# Patient Record
Sex: MALE | Race: WHITE | Employment: OTHER | URBAN - METROPOLITAN AREA
[De-identification: names, ages, dates, MRNs, and addresses within clinical notes are randomized per-mention and may not be internally consistent; named-entity substitution may affect disease eponyms.]

---

## 2017-09-11 PROCEDURE — 84403 ASSAY OF TOTAL TESTOSTERONE: CPT | Performed by: INTERNAL MEDICINE

## 2017-09-11 PROCEDURE — 84402 ASSAY OF FREE TESTOSTERONE: CPT | Performed by: INTERNAL MEDICINE

## 2018-12-21 PROCEDURE — 87493 C DIFF AMPLIFIED PROBE: CPT | Performed by: INTERNAL MEDICINE

## 2019-12-16 PROBLEM — K86.1 CHRONIC PANCREATITIS, UNSPECIFIED PANCREATITIS TYPE (HCC): Status: ACTIVE | Noted: 2019-12-16

## 2020-04-20 NOTE — Clinical Note
Please fax copy of note to janes and put note on letter if they can fax most recent biopsy results. Thanks.      Joanna Morocho,   [facility]  12/23/2021 Dermal Autograft Text: The defect edges were debeveled with a #15 scalpel blade.  Given the location of the defect, shape of the defect and the proximity to free margins a dermal autograft was deemed most appropriate.  Using a sterile surgical marker, the primary defect shape was transferred to the donor site. The area thus outlined was incised deep to adipose tissue with a #15 scalpel blade.  The harvested graft was then trimmed of adipose and epidermal tissue until only dermis was left.  The skin graft was then placed in the primary defect and oriented appropriately.

## 2020-12-10 PROBLEM — M19.072 OSTEOARTHRITIS OF MIDTARSAL JOINT OF LEFT FOOT: Status: ACTIVE | Noted: 2020-12-10

## 2021-02-09 PROBLEM — I25.118: Status: ACTIVE | Noted: 2021-02-09

## 2021-02-09 PROBLEM — I70.0 AORTIC ATHEROSCLEROSIS: Status: ACTIVE | Noted: 2021-02-09

## 2021-02-09 PROBLEM — I35.0 NONRHEUMATIC AORTIC VALVE STENOSIS: Status: ACTIVE | Noted: 2021-02-09

## 2021-02-09 PROBLEM — M06.00 SERONEGATIVE RHEUMATOID ARTHRITIS (HCC): Status: ACTIVE | Noted: 2021-02-09

## 2021-02-09 PROBLEM — I25.118 ATHEROSCLEROSIS OF CORONARY ARTERY OF NATIVE HEART WITH STABLE ANGINA PECTORIS, UNSPECIFIED VESSEL OR LESION TYPE (HCC): Status: ACTIVE | Noted: 2021-02-09

## 2021-02-09 PROBLEM — R03.0 ELEVATED BP WITHOUT DIAGNOSIS OF HYPERTENSION: Status: ACTIVE | Noted: 2021-02-09

## 2021-02-09 PROBLEM — I70.0 AORTIC ATHEROSCLEROSIS (HCC): Status: ACTIVE | Noted: 2021-02-09

## 2021-06-20 ENCOUNTER — APPOINTMENT (OUTPATIENT)
Dept: MRI IMAGING | Facility: HOSPITAL | Age: 81
End: 2021-06-20
Attending: EMERGENCY MEDICINE
Payer: MEDICARE

## 2021-06-20 ENCOUNTER — APPOINTMENT (OUTPATIENT)
Dept: GENERAL RADIOLOGY | Facility: HOSPITAL | Age: 81
End: 2021-06-20
Attending: EMERGENCY MEDICINE
Payer: MEDICARE

## 2021-06-20 ENCOUNTER — HOSPITAL ENCOUNTER (EMERGENCY)
Facility: HOSPITAL | Age: 81
Discharge: HOME | End: 2021-06-20
Attending: EMERGENCY MEDICINE
Payer: MEDICARE

## 2021-06-20 VITALS
HEIGHT: 67 IN | OXYGEN SATURATION: 97 % | DIASTOLIC BLOOD PRESSURE: 74 MMHG | HEART RATE: 67 BPM | SYSTOLIC BLOOD PRESSURE: 156 MMHG | TEMPERATURE: 97 F | BODY MASS INDEX: 22.45 KG/M2 | WEIGHT: 143.06 LBS | RESPIRATION RATE: 15 BRPM

## 2021-06-20 DIAGNOSIS — R42 DIZZINESS: Primary | ICD-10-CM

## 2021-06-20 PROCEDURE — 85730 THROMBOPLASTIN TIME PARTIAL: CPT | Performed by: EMERGENCY MEDICINE

## 2021-06-20 PROCEDURE — 70549 MR ANGIOGRAPH NECK W/O&W/DYE: CPT | Performed by: EMERGENCY MEDICINE

## 2021-06-20 PROCEDURE — 99284 EMERGENCY DEPT VISIT MOD MDM: CPT

## 2021-06-20 PROCEDURE — A9575 INJ GADOTERATE MEGLUMI 0.1ML: HCPCS | Performed by: EMERGENCY MEDICINE

## 2021-06-20 PROCEDURE — 70553 MRI BRAIN STEM W/O & W/DYE: CPT | Performed by: EMERGENCY MEDICINE

## 2021-06-20 PROCEDURE — 71045 X-RAY EXAM CHEST 1 VIEW: CPT | Performed by: EMERGENCY MEDICINE

## 2021-06-20 PROCEDURE — 85025 COMPLETE CBC W/AUTO DIFF WBC: CPT | Performed by: EMERGENCY MEDICINE

## 2021-06-20 PROCEDURE — 70546 MR ANGIOGRAPH HEAD W/O&W/DYE: CPT | Performed by: EMERGENCY MEDICINE

## 2021-06-20 PROCEDURE — 36415 COLL VENOUS BLD VENIPUNCTURE: CPT

## 2021-06-20 PROCEDURE — 85610 PROTHROMBIN TIME: CPT | Performed by: EMERGENCY MEDICINE

## 2021-06-20 PROCEDURE — 93005 ELECTROCARDIOGRAM TRACING: CPT

## 2021-06-20 PROCEDURE — 84484 ASSAY OF TROPONIN QUANT: CPT | Performed by: EMERGENCY MEDICINE

## 2021-06-20 PROCEDURE — 93010 ELECTROCARDIOGRAM REPORT: CPT

## 2021-06-20 PROCEDURE — 80053 COMPREHEN METABOLIC PANEL: CPT | Performed by: EMERGENCY MEDICINE

## 2021-06-20 RX ORDER — MECLIZINE HCL 12.5 MG/1
12.5 TABLET ORAL ONCE
Status: COMPLETED | OUTPATIENT
Start: 2021-06-20 | End: 2021-06-20

## 2021-06-20 RX ORDER — ONDANSETRON 2 MG/ML
4 INJECTION INTRAMUSCULAR; INTRAVENOUS ONCE
Status: DISCONTINUED | OUTPATIENT
Start: 2021-06-20 | End: 2021-06-20

## 2021-06-20 NOTE — ED QUICK NOTES
Pt awake and alert, skin w/d,resps reg/unlabored. Gait steady. Speech clear. Family at bedside. Pt ready for discharge.

## 2021-06-20 NOTE — ED PROVIDER NOTES
Patient Seen in: [facility] Emergency Department      History   Patient presents with:  Dizziness    Stated Complaint: dizziness and feeling unsteady on feet with nausea starting at 4am, vomited a c*    HPI/Subjective:   HPI    56-year-old male pres Systems    Positive for stated complaint: dizziness and feeling unsteady on feet with nausea starting at 4am, vomited a c*  Other systems are as noted in HPI. Constitutional and vital signs reviewed.       All other systems reviewed and negative except as following components:    PTT 36.8 (*)     All other components within normal limits   CBC W/ DIFFERENTIAL - Abnormal; Notable for the following components:    RDW-SD 46.8 (*)     Lymphocyte Absolute 0.97 (*)     All other components within normal limits (IHK=95570/97420/47571)    Result Date: 6/20/2021  PROCEDURE:  MRI BRAIN MRA HEAD+MRA NECK (ALL W+WO) (CPT=70553/92193/63596)  COMPARISON:  None.   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 No signs of occlusion.   No restricted diffusion or oth

## 2021-06-20 NOTE — ED INITIAL ASSESSMENT (HPI)
Reports got out of bed, had unsteady gait. A little better now. Took 1/2 meclizine PTA. Here with family member, who is ER physician.

## 2021-06-21 PROBLEM — R26.89 IMBALANCE: Status: ACTIVE | Noted: 2021-06-21

## 2021-06-21 PROBLEM — H69.83 ETD (EUSTACHIAN TUBE DYSFUNCTION), BILATERAL: Status: ACTIVE | Noted: 2021-06-21

## 2021-06-21 PROBLEM — H69.93 ETD (EUSTACHIAN TUBE DYSFUNCTION), BILATERAL: Status: ACTIVE | Noted: 2021-06-21

## 2021-06-21 PROBLEM — L30.9 DERMATITIS: Status: ACTIVE | Noted: 2021-06-21

## 2021-06-21 PROBLEM — R11.0 NAUSEA: Status: ACTIVE | Noted: 2021-06-21

## 2021-06-21 PROBLEM — R68.2 DRY MOUTH: Status: ACTIVE | Noted: 2021-06-21

## 2021-06-21 PROBLEM — G47.00 INSOMNIA, UNSPECIFIED TYPE: Status: ACTIVE | Noted: 2021-06-21

## 2021-06-21 PROBLEM — F41.1 GAD (GENERALIZED ANXIETY DISORDER): Status: ACTIVE | Noted: 2021-06-21

## 2021-08-12 ENCOUNTER — OFFICE VISIT (OUTPATIENT)
Dept: RHEUMATOLOGY | Facility: CLINIC | Age: 81
End: 2021-08-12
Payer: MEDICARE

## 2021-08-12 VITALS
HEART RATE: 76 BPM | BODY MASS INDEX: 22.6 KG/M2 | DIASTOLIC BLOOD PRESSURE: 82 MMHG | HEIGHT: 67 IN | WEIGHT: 144 LBS | SYSTOLIC BLOOD PRESSURE: 156 MMHG

## 2021-08-12 DIAGNOSIS — L40.9 PSORIASIS: ICD-10-CM

## 2021-08-12 DIAGNOSIS — L40.50 PSORIATIC ARTHRITIS (HCC): Primary | ICD-10-CM

## 2021-08-12 PROCEDURE — 99204 OFFICE O/P NEW MOD 45 MIN: CPT | Performed by: INTERNAL MEDICINE

## 2021-08-12 NOTE — PROGRESS NOTES
Dat Barnes is a 79year old male who presents for No chief complaint on file. Clinton Lopez HPI:   CC: evaluate for RA  Consult: referred by PCP Dr. Debra Muñoz    This is a 78 yo M with hx of HLD, COPD presents for second opinion of his rheumatoid arthritis.   He Heartburn. • fluticasone-salmeterol (ADVAIR HFA) 115-21 MCG/ACT Inhalation Aerosol Inhale 2 puffs into the lungs 2 (two) times daily.  3 Inhaler 3   • Tiotropium Bromide Monohydrate (SPIRIVA RESPIMAT) 2.5 MCG/ACT Inhalation Aero Soln Inhale 1 Inhaler in Smoker        Packs/day: 1.00        Types: Cigarettes        Start date: 1955        Quit date: 2018        Years since quittin.9      Smokeless tobacco: Never Used    Alcohol use: Not Currently      Alcohol/week: 0.0 standard drinks      Com pain or swelling or warmth on palpation  Feet: no pain with MTP squeeze, no toe swelling or pain or warmth on palpation with FROM  Spine: no lumbar or sacral pain on palpation. NEURO: Cranial nerves II-XII intact grossly.  5/5 strength throughout in both u cleared. Once he start Plaquenil his psoriasis came back on his legs and his elbows  - Plaquenil was decreased to 200 mg daily.  - Advised patient that the best option would be a biologic such as Humira, would help treat both the joints and the skin.   Ris

## 2021-08-12 NOTE — PATIENT INSTRUCTIONS
You were seen today for joint pain and swelling due to psoriatic arthritis  You currently are on Plaquenil/hydroxychloroquine, can make the psoriasis worse so would recommend to discontinue it.   We will hold off on discontinuing until we decide which medic

## 2021-08-13 ENCOUNTER — TELEPHONE (OUTPATIENT)
Dept: RHEUMATOLOGY | Facility: CLINIC | Age: 81
End: 2021-08-13

## 2021-08-25 NOTE — TELEPHONE ENCOUNTER
José Manuel Mahan with apothecary by [facility] has been approved by [employer]. Patient has a high copay.  Please advise if patient will be applying for financial assistance program.

## 2021-08-30 PROBLEM — L40.50 PSORIATIC ARTHRITIS (HCC): Status: ACTIVE | Noted: 2021-08-30

## 2021-08-30 PROBLEM — L40.9 PSORIASIS: Status: ACTIVE | Noted: 2021-08-30

## 2021-08-30 NOTE — TELEPHONE ENCOUNTER
[facility] assist provider portion completed and faxed. Attempted to contact patient to make sure he received patient portion.

## 2021-08-31 ENCOUNTER — TELEPHONE (OUTPATIENT)
Dept: RHEUMATOLOGY | Facility: CLINIC | Age: 81
End: 2021-08-31

## 2021-08-31 NOTE — TELEPHONE ENCOUNTER
Brenda Thakkar is requesting patient's demographics. Patient is new to them and has received an application. Fax # [phone].

## 2021-09-03 NOTE — TELEPHONE ENCOUNTER
Sonya sent fax requesting contact information and insurance information.    Information provided to [facility] Assist.

## 2021-12-23 ENCOUNTER — OFFICE VISIT (OUTPATIENT)
Dept: RHEUMATOLOGY | Facility: CLINIC | Age: 81
End: 2021-12-23
Payer: MEDICARE

## 2021-12-23 VITALS
HEIGHT: 67 IN | WEIGHT: 141 LBS | RESPIRATION RATE: 16 BRPM | BODY MASS INDEX: 22.13 KG/M2 | OXYGEN SATURATION: 99 % | SYSTOLIC BLOOD PRESSURE: 138 MMHG | HEART RATE: 56 BPM | TEMPERATURE: 97 F | DIASTOLIC BLOOD PRESSURE: 60 MMHG

## 2021-12-23 DIAGNOSIS — M53.3 CHRONIC LEFT SI JOINT PAIN: ICD-10-CM

## 2021-12-23 DIAGNOSIS — R21 SKIN RASH: ICD-10-CM

## 2021-12-23 DIAGNOSIS — M54.50 CHRONIC LEFT-SIDED LOW BACK PAIN WITHOUT SCIATICA: ICD-10-CM

## 2021-12-23 DIAGNOSIS — R76.8 ANA POSITIVE: ICD-10-CM

## 2021-12-23 DIAGNOSIS — G89.29 CHRONIC LEFT SI JOINT PAIN: ICD-10-CM

## 2021-12-23 DIAGNOSIS — L60.8 NAIL PITTING: ICD-10-CM

## 2021-12-23 DIAGNOSIS — Z79.899 HIGH RISK MEDICATION USE: ICD-10-CM

## 2021-12-23 DIAGNOSIS — L40.9 PSORIASIS: ICD-10-CM

## 2021-12-23 DIAGNOSIS — G89.29 CHRONIC LEFT-SIDED LOW BACK PAIN WITHOUT SCIATICA: ICD-10-CM

## 2021-12-23 DIAGNOSIS — L40.50 PSORIATIC ARTHRITIS (HCC): Primary | ICD-10-CM

## 2021-12-23 PROCEDURE — 99205 OFFICE O/P NEW HI 60 MIN: CPT | Performed by: INTERNAL MEDICINE

## 2021-12-23 RX ORDER — OMEPRAZOLE 20 MG/1
20 CAPSULE, DELAYED RELEASE ORAL
COMMUNITY

## 2021-12-23 NOTE — PROGRESS NOTES
?  RHEUMATOLOGY NEW PATIENT   Date of visit: 12/23/2021  ? Patient presents with:  Establish Care: new pt. Dr. Yuli Sanders referral. Symptoms started in 2020. Previous rheum Dr. Aracelis Maxwell. Was told at first that he had RA and was put on plaquenil.  Previous prov rash and his dry mouth/fatigue. Given the concern for psoriatic arthritis (hx of inflammatory arthritis, nail changes, psoriasis of skin), I am worried about possible SI joint inflammation.    I recommended he get updated age appropriate cancer screenings, VIEWS) (CPT=72110); Future    Chronic left SI joint pain  -     Cancel: XR SACROILIAC JOINTS (MIN 3 VIEWS) (CPT=72202); Future    High risk medication use  -     CBC WITH DIFFERENTIAL WITH PLATELET; Future  -     COMP METABOLIC PANEL (14);  Future    Skin r not able to get approved through insurance. After completing sample pack, he was able to get generic from [facility] but still having skin flaring while on the generic.   Was seen by dermatology- Dr. Ambrose Fleischer again, and started prednisone (as high as 40mg) and did pain, diarrhea, bloody stools, nodular painful shin bruises, Achilles heel pain or symptoms of enthesitis. There are no symptoms of severe dry eyes.   No fevers, chills, lymphadenopathy, unexpected weight loss,      Past Medical History:  Past Medical Hist Disp: 90 tablet, Rfl: 0  fluticasone-umeclidin-vilant (TRELEGY ELLIPTA) 100-62.5-25 MCG/INH Inhalation Aerosol Powder, Breath Activated, Inhale 1 puff into the lungs daily. , Disp: 3 each, Rfl: 3  amLODIPine 2.5 MG Oral Tab, Take 1 tablet (2.5 mg total) by hemoptysis and shortness of breath (hx COPD). Cardiovascular: Positive for leg swelling. Negative for chest pain and palpitations. Gastrointestinal: Negative for abdominal pain, blood in stool, constipation, diarrhea and heartburn.    Musculoskeletal: tender. No spinous process tenderness. .  Bilateral knees without medial joint line tenderness, mild crepitus, no effusion. Lymphadenopathy:      Cervical: No cervical adenopathy. Skin:     General: Skin is warm and dry.       Findings: Erythema and marcy     With contrast infusion, there is no abnormal enhancement pattern identified.       MRA HEAD AND NECK       Patency of the carotid and vertebral arterial structures of the neck, with no signs of occlusion, high-grade stenosis, acute thrombosis, acute Finalized by (CST): Rylee Ortez MD on 6/20/2021 at 3:47 PM      DATE OF SERVICE: 04.22.2021     LEFT FOOT  AP, LATERAL AND OBLIQUE (3 VIEWS)   CLINICAL INFORMATION:  Left foot pain. COMPARISON STUDY: Left foot, 12/6/2020.    FINDINGS:   No acute fra bilateral hands and wrists.      DATE OF SERVICE: 01.01.2021     CT CHEST   CLINICAL INDICATION: Follow-up abnormal chest CT   COMPARISON: Chest CT 5/1/2020   TECHNIQUE: 1.25mm thick axial images were obtained through the chest. Coronal reconstructions were Labs:  Lab Results   Component Value Date    WBC 7.1 06/20/2021    RBC 4.36 06/20/2021    HGB 14.5 06/20/2021    HCT 41.9 06/20/2021    .0 06/20/2021    MPV 9.9 03/26/2011    MCV 96.1 06/20/2021    MCH 33.3 06/20/2021    MCHC 34.6 06/20/2021

## 2022-01-03 ENCOUNTER — TELEPHONE (OUTPATIENT)
Dept: RHEUMATOLOGY | Facility: CLINIC | Age: 82
End: 2022-01-03

## 2022-01-03 NOTE — TELEPHONE ENCOUNTER
Phoned pt, pt states some labs have been resulted to him and pt is concerned WBC 12.7. Explained to pt Dr. Cesia Kerr is out of the office this week, and occasionally will be checking in with our office.  Explained to pt WBC mildly elevated, pt denies any signs

## 2022-01-03 NOTE — TELEPHONE ENCOUNTER
Pt calling in, would like do discuss some of his lab results and would like to know what Dr. Gisselle Moon thinks about his labs. Pt would like to know about his SOO and WBC.  Pt is wanting to know if Dr. Gisselle Moon believes that the pt has an infection due to his WBC

## 2022-01-04 PROBLEM — L30.8 SPONGIOTIC DERMATITIS: Status: ACTIVE | Noted: 2022-01-04

## 2022-01-04 NOTE — TELEPHONE ENCOUNTER
Called pt. Discussed test results in detail. CBC did show elevated WBC which was up from 06/2021 testing. Discussed this could be a reaction to his widespread active skin but could be related to underlying infection.  He has plans to see his PCP today but

## 2022-01-06 ENCOUNTER — TELEPHONE (OUTPATIENT)
Dept: RHEUMATOLOGY | Facility: CLINIC | Age: 82
End: 2022-01-06

## 2022-01-06 NOTE — TELEPHONE ENCOUNTER
Returned pt call, pt states PA for Tremfya which was ordered by his old Rheumatologist has been denied per his health plan. Pt states denial letter states pt must have tried and failed:  Humira, Enbrel and Cosentyx. MRVY#[id number].    Pt requesting we contac

## 2022-01-07 NOTE — TELEPHONE ENCOUNTER
Phoned pt and explained Dr. Adeline Eason note to pt \"discontinue [place] completely in case the rash was a reaction to the medication. I'm not sure if he remembers that conversation.  I would recommend discontinuing and then monitoring rash after one week befor

## 2022-01-10 ENCOUNTER — TELEPHONE (OUTPATIENT)
Dept: RHEUMATOLOGY | Facility: CLINIC | Age: 82
End: 2022-01-10

## 2022-01-10 NOTE — TELEPHONE ENCOUNTER
Phoned pt, explained to pt new lesions despite holding otezla, unlikely related to [address] since he's stopped.  pt coming in for teaching for the cosentyx, however would like to stay on Thiago Lindsey due to his worry over out of pocket cost. Pt has reached out to debra

## 2022-01-11 ENCOUNTER — PATIENT MESSAGE (OUTPATIENT)
Dept: RHEUMATOLOGY | Facility: CLINIC | Age: 82
End: 2022-01-11

## 2022-01-11 DIAGNOSIS — R21 SKIN RASH: Primary | ICD-10-CM

## 2022-01-11 DIAGNOSIS — R76.8 ANA POSITIVE: ICD-10-CM

## 2022-01-13 ENCOUNTER — LAB ENCOUNTER (OUTPATIENT)
Dept: LAB | Facility: HOSPITAL | Age: 82
End: 2022-01-13
Attending: INTERNAL MEDICINE
Payer: MEDICARE

## 2022-01-13 DIAGNOSIS — R76.8 ANA POSITIVE: ICD-10-CM

## 2022-01-13 DIAGNOSIS — R21 SKIN RASH: ICD-10-CM

## 2022-01-13 DIAGNOSIS — Z79.899 HIGH RISK MEDICATION USE: ICD-10-CM

## 2022-01-13 DIAGNOSIS — L40.50 PSORIATIC ARTHRITIS (HCC): ICD-10-CM

## 2022-01-13 LAB
ALBUMIN SERPL-MCNC: 4 G/DL (ref 3.4–5)
ALBUMIN/GLOB SERPL: 1.4 {RATIO} (ref 1–2)
ALP LIVER SERPL-CCNC: 56 U/L
ALT SERPL-CCNC: 36 U/L
ANION GAP SERPL CALC-SCNC: 4 MMOL/L (ref 0–18)
AST SERPL-CCNC: 22 U/L (ref 15–37)
BASOPHILS # BLD AUTO: 0.03 X10(3) UL (ref 0–0.2)
BASOPHILS NFR BLD AUTO: 0.4 %
BILIRUB SERPL-MCNC: 0.9 MG/DL (ref 0.1–2)
BUN BLD-MCNC: 22 MG/DL (ref 7–18)
CALCIUM BLD-MCNC: 9.4 MG/DL (ref 8.5–10.1)
CHLORIDE SERPL-SCNC: 103 MMOL/L (ref 98–112)
CO2 SERPL-SCNC: 32 MMOL/L (ref 21–32)
CREAT BLD-MCNC: 1.07 MG/DL
EOSINOPHIL # BLD AUTO: 0.09 X10(3) UL (ref 0–0.7)
EOSINOPHIL NFR BLD AUTO: 1.1 %
ERYTHROCYTE [DISTWIDTH] IN BLOOD BY AUTOMATED COUNT: 15 %
FASTING STATUS PATIENT QL REPORTED: YES
GLOBULIN PLAS-MCNC: 2.8 G/DL (ref 2.8–4.4)
GLUCOSE BLD-MCNC: 88 MG/DL (ref 70–99)
HCT VFR BLD AUTO: 47.3 %
HGB BLD-MCNC: 15.1 G/DL
IMM GRANULOCYTES # BLD AUTO: 0.02 X10(3) UL (ref 0–1)
IMM GRANULOCYTES NFR BLD: 0.2 %
LYMPHOCYTES # BLD AUTO: 1.51 X10(3) UL (ref 1–4)
LYMPHOCYTES NFR BLD AUTO: 18.9 %
MCH RBC QN AUTO: 30 PG (ref 26–34)
MCHC RBC AUTO-ENTMCNC: 31.9 G/DL (ref 31–37)
MCV RBC AUTO: 93.8 FL
MONOCYTES # BLD AUTO: 0.92 X10(3) UL (ref 0.1–1)
MONOCYTES NFR BLD AUTO: 11.5 %
NEUTROPHILS # BLD AUTO: 5.44 X10 (3) UL (ref 1.5–7.7)
NEUTROPHILS # BLD AUTO: 5.44 X10(3) UL (ref 1.5–7.7)
NEUTROPHILS NFR BLD AUTO: 67.9 %
OSMOLALITY SERPL CALC.SUM OF ELEC: 291 MOSM/KG (ref 275–295)
PLATELET # BLD AUTO: 185 10(3)UL (ref 150–450)
POTASSIUM SERPL-SCNC: 4.3 MMOL/L (ref 3.5–5.1)
PROT SERPL-MCNC: 6.8 G/DL (ref 6.4–8.2)
RBC # BLD AUTO: 5.04 X10(6)UL
SODIUM SERPL-SCNC: 139 MMOL/L (ref 136–145)
WBC # BLD AUTO: 8 X10(3) UL (ref 4–11)

## 2022-01-13 PROCEDURE — 86235 NUCLEAR ANTIGEN ANTIBODY: CPT

## 2022-01-13 PROCEDURE — 80053 COMPREHEN METABOLIC PANEL: CPT

## 2022-01-13 PROCEDURE — 86256 FLUORESCENT ANTIBODY TITER: CPT

## 2022-01-13 PROCEDURE — 85025 COMPLETE CBC W/AUTO DIFF WBC: CPT

## 2022-01-13 PROCEDURE — 36415 COLL VENOUS BLD VENIPUNCTURE: CPT

## 2022-01-13 PROCEDURE — 86038 ANTINUCLEAR ANTIBODIES: CPT

## 2022-01-14 LAB
ANA SER QL: NEGATIVE
ENA SM AB SER-ACNC: <100 AU/ML (ref ?–100)
HISTONE AB SER-ACNC: <100 AU/ML (ref ?–100)

## 2022-01-16 LAB — ANTI-NUCLEAR ANTIBODY (ANA), HEP-2 IGG: DETECTED

## 2022-01-17 ENCOUNTER — VIRTUAL PHONE E/M (OUTPATIENT)
Dept: RHEUMATOLOGY | Facility: CLINIC | Age: 82
End: 2022-01-17
Payer: MEDICARE

## 2022-01-17 DIAGNOSIS — R21 SKIN RASH: Primary | ICD-10-CM

## 2022-01-17 DIAGNOSIS — R76.8 ANA POSITIVE: ICD-10-CM

## 2022-01-17 DIAGNOSIS — L40.50 PSORIATIC ARTHRITIS (HCC): ICD-10-CM

## 2022-01-17 DIAGNOSIS — L40.0 PLAQUE PSORIASIS: ICD-10-CM

## 2022-01-17 PROCEDURE — 99213 OFFICE O/P EST LOW 20 MIN: CPT | Performed by: INTERNAL MEDICINE

## 2022-01-17 NOTE — PROGRESS NOTES
Virtual Telephone Check-In    [address] verbally consents to a Virtual/Telephone Check-In visit on 01/17/22. Patient has been referred to the [facility] website at [email or web address] to review the yearly Consent to Treat document.     Patient under

## 2022-01-20 ENCOUNTER — TELEPHONE (OUTPATIENT)
Dept: RHEUMATOLOGY | Facility: CLINIC | Age: 82
End: 2022-01-20

## 2022-01-20 NOTE — TELEPHONE ENCOUNTER
PA for Cosentyx approved through 12/31/22. Ref# [id number]    Phoned pt to notify of approval and arrange teaching. Pt states he will be seeing an allergist tomorrow and would like to see what he recommends.  States his skin has improved, would like to [employer]

## 2022-01-21 NOTE — TELEPHONE ENCOUNTER
Pt phoned office, has met with allergist today. No new information given to pt at today's visit. Pt having co of stiffness, and would like to move forward with cosentyx. Nurse teaching scheduled.    Future Appointments   Date Time Provider Jerri Girard

## 2022-01-24 ENCOUNTER — TELEPHONE (OUTPATIENT)
Dept: RHEUMATOLOGY | Facility: CLINIC | Age: 82
End: 2022-01-24

## 2022-01-24 ENCOUNTER — NURSE ONLY (OUTPATIENT)
Dept: RHEUMATOLOGY | Facility: CLINIC | Age: 82
End: 2022-01-24
Payer: MEDICARE

## 2022-01-24 PROCEDURE — 99211 OFF/OP EST MAY X REQ PHY/QHP: CPT | Performed by: INTERNAL MEDICINE

## 2022-01-24 NOTE — PROGRESS NOTES
Pt and spouse here for cosentyx teaching. Storage, handling of medication , and injection training reviewed. After using demo pen, pt safely and accurately administered 150 mg sample subcutaneous into right upper abdomen. Pt tolerated without incidence.

## 2022-02-07 ENCOUNTER — NURSE ONLY (OUTPATIENT)
Dept: RHEUMATOLOGY | Facility: CLINIC | Age: 82
End: 2022-02-07
Payer: MEDICARE

## 2022-02-07 DIAGNOSIS — L40.50 PSORIATIC ARTHRITIS (HCC): Primary | ICD-10-CM

## 2022-02-21 ENCOUNTER — TELEPHONE (OUTPATIENT)
Dept: RHEUMATOLOGY | Facility: CLINIC | Age: 82
End: 2022-02-21

## 2022-02-21 NOTE — TELEPHONE ENCOUNTER
Phoned pt, have not heard from [employer] regarding Cosentyx. Offered samples in office. Pt still has 2pens, ok for now. Will notify once we hear back.

## 2022-02-28 ENCOUNTER — TELEPHONE (OUTPATIENT)
Dept: RHEUMATOLOGY | Facility: CLINIC | Age: 82
End: 2022-02-28

## 2022-02-28 NOTE — TELEPHONE ENCOUNTER
Pt phoned office, states he has qualified from [employer] for Cosentyx until the end of the year. Pt also would like to schedule appt with Dr. Giovani Blankenship to drain his Left Elbow. States it is fluid filled, and pretty painful. Dr. Giovani Blankenship evaluated and instructed pt to call office if he would like the fluid drained.

## 2022-03-01 ENCOUNTER — TELEPHONE (OUTPATIENT)
Dept: RHEUMATOLOGY | Facility: CLINIC | Age: 82
End: 2022-03-01

## 2022-03-02 ENCOUNTER — OFFICE VISIT (OUTPATIENT)
Dept: RHEUMATOLOGY | Facility: CLINIC | Age: 82
End: 2022-03-02
Payer: MEDICARE

## 2022-03-02 VITALS — DIASTOLIC BLOOD PRESSURE: 60 MMHG | SYSTOLIC BLOOD PRESSURE: 132 MMHG | TEMPERATURE: 97 F

## 2022-03-02 DIAGNOSIS — M25.522 LEFT ELBOW PAIN: Primary | ICD-10-CM

## 2022-03-02 DIAGNOSIS — M25.422 EFFUSION, LEFT ELBOW: ICD-10-CM

## 2022-03-02 LAB
BASOPHILS NFR SNV: 0 %
CRYSTALS SNV QL MICRO: NEGATIVE
EOSINOPHIL NFR SNV: 0 %
GRANULOCYTES # SNV AUTO: 298 /MM3 (ref 0–200)
LYMPHOCYTES NFR SNV: 53 %
MONOS+MACROS NFR SNV: 45 %
NEUTROPHILS NFR SNV: 2 %
RBC # FLD AUTO: ABNORMAL /MM3 (ref ?–1)
TOTAL CELLS COUNTED FLD: 100

## 2022-03-02 PROCEDURE — 89050 BODY FLUID CELL COUNT: CPT | Performed by: INTERNAL MEDICINE

## 2022-03-02 PROCEDURE — 20606 DRAIN/INJ JOINT/BURSA W/US: CPT | Performed by: INTERNAL MEDICINE

## 2022-03-02 PROCEDURE — 87070 CULTURE OTHR SPECIMN AEROBIC: CPT | Performed by: INTERNAL MEDICINE

## 2022-03-02 PROCEDURE — 89060 EXAM SYNOVIAL FLUID CRYSTALS: CPT | Performed by: INTERNAL MEDICINE

## 2022-03-02 PROCEDURE — 87205 SMEAR GRAM STAIN: CPT | Performed by: INTERNAL MEDICINE

## 2022-03-02 RX ORDER — LIDOCAINE HYDROCHLORIDE 10 MG/ML
5 INJECTION, SOLUTION INFILTRATION; PERINEURAL ONCE
Status: COMPLETED | OUTPATIENT
Start: 2022-03-02 | End: 2022-03-02

## 2022-03-02 RX ADMIN — LIDOCAINE HYDROCHLORIDE 5 ML: 10 INJECTION, SOLUTION INFILTRATION; PERINEURAL at 14:32:00

## 2022-03-03 NOTE — PROCEDURES
Left Elbow Procedure Note    Procedure: Ultrasound examination of the left elbow  Equipment: [employer] II ultrasound machine. Indication: Pain in left  elbow, difficulty performing blind aspiration/injection. Obesity no  ? Findings:  The patient was seated and had her elbow extended and forearm supinated. The lateral and posterior elbow was evaluated. Exam somewhat limited due to large effusion/fluid accumulation. There was some synovial debri noted in fluid. ?  Impression: Left elbow effusion. Clinical correlation and other imaging studies may be indicated. ? Reference: Guidelines for musculoskeletal ultrasound in rheumatology  Carson Macias., Macrina Thakur., Fabián Sparks., [place], [address].; working group for musculoskeletal ultrasound in the EULAR standing committee on international clinical studies including therapeutic trials. Annals rheumatic disease 2001 July; 60(7): 641-9    Verbal consent obtained for elbow aspiration. Sterile approach used. Skin was cleansed with Hibiclens and alcohol. Sterile gel was used and in an out of plane approach, a 22 gauge needle was used to aspirate fluid. A total of 5cc 1% lidocaine was used for skin anesthesia. Then approximately 34cc of bloody cloudy synovial fluid was aspirated. Steroids were not injected due to cloudy appearance of fluid and would like to avoid steroid until infection ruled out. Fluid sent for culture and cell count and crystal analysis. Will notify pt of results. He tolerated procedure without side effects. Post-procedure instructions provided. Him and his wife had a lot of other questions, which I encouraged them we will discuss at upcoming follow up appt scheduled for 03/30. Pt has started cosentyx injections without obvious side effects yet. ?   Francesca Sepulveda, DO  [facility]  03/02/22

## 2022-03-04 ENCOUNTER — OFFICE VISIT (OUTPATIENT)
Dept: FAMILY MEDICINE CLINIC | Facility: CLINIC | Age: 82
End: 2022-03-04
Payer: MEDICARE

## 2022-03-04 ENCOUNTER — TELEPHONE (OUTPATIENT)
Dept: RHEUMATOLOGY | Facility: CLINIC | Age: 82
End: 2022-03-04

## 2022-03-04 ENCOUNTER — HOSPITAL ENCOUNTER (OUTPATIENT)
Age: 82
Discharge: HOME | End: 2022-03-04
Attending: EMERGENCY MEDICINE
Payer: MEDICARE

## 2022-03-04 VITALS
BODY MASS INDEX: 21.66 KG/M2 | DIASTOLIC BLOOD PRESSURE: 71 MMHG | TEMPERATURE: 98 F | SYSTOLIC BLOOD PRESSURE: 157 MMHG | WEIGHT: 138 LBS | OXYGEN SATURATION: 97 % | HEART RATE: 77 BPM | HEIGHT: 67 IN | RESPIRATION RATE: 18 BRPM

## 2022-03-04 VITALS — RESPIRATION RATE: 16 BRPM | BODY MASS INDEX: 21.41 KG/M2 | HEIGHT: 67.5 IN | WEIGHT: 138 LBS

## 2022-03-04 DIAGNOSIS — M25.422 JOINT EFFUSION OF ELBOW, LEFT: Primary | ICD-10-CM

## 2022-03-04 DIAGNOSIS — M70.22 OLECRANON BURSITIS OF LEFT ELBOW: Primary | ICD-10-CM

## 2022-03-04 PROCEDURE — 99203 OFFICE O/P NEW LOW 30 MIN: CPT

## 2022-03-04 PROCEDURE — 99213 OFFICE O/P EST LOW 20 MIN: CPT

## 2022-03-04 RX ORDER — CEPHALEXIN 500 MG/1
500 CAPSULE ORAL 3 TIMES DAILY
Qty: 21 CAPSULE | Refills: 0 | Status: SHIPPED | OUTPATIENT
Start: 2022-03-04 | End: 2022-03-14

## 2022-03-04 NOTE — ED INITIAL ASSESSMENT (HPI)
Left elbow - c/o itching with swelling. pt went to the rheumatologist Wednesday  Dx with bursitis states elbow was drained. The following day pt noted  Rash , with itching, denies fever. C/o bump.  Pt has appt with ortho on March 15

## 2022-03-04 NOTE — PROGRESS NOTES
Patient presented to the walk-in clinic for evaluation of his left elbow. Brought patient back to the walk-in clinic and introduced myself. Patient's first question was if I was a physician. I explained that I was a physician assistant and that the walk-in clinics are staffed by midlevel providers. Patient states he was told that he would see a physician. I explained that I am happy to see him but if he would like to be seen at the immediate care, they have physicians there. Patient did agree to talk with me regarding his elbow. He states that it was drained a couple of days ago, and this morning he noticed some increased pain and itching in the elbow area with slight rash. He put some Bactroban on the area which did not seem to help. He was also concerned with a lump on his lower left elbow, and would like to get an x-ray. Upon exam patient is alert and oriented x3, appears happy and well. Left elbow with prominent roughly 5x6 cm of fluid filled effusion, slight superficial dermatitis noted. No active drainage or warmth present. Explained I could further exam patient with vitals and full exam or he could have the choice to go the IC for further worked from physician, possible x-ray discussed. Patient agreeable to IC, placed on triage board. No questions upon leaving.

## 2022-03-07 NOTE — PROGRESS NOTES
Phoned pt, pt states he was seen and declined aspiration of fluid from his elbow for culture. Pt was prescribed antibiotics, and has declined taking prescription. States his son is an ER doctor, and has examined his elbow. Feels there is no infection present. Abefrile, no warmth or drainage present. Pt feels redness has subsided, fluid still present.

## 2022-03-15 ENCOUNTER — OFFICE VISIT (OUTPATIENT)
Dept: ORTHOPEDICS CLINIC | Facility: CLINIC | Age: 82
End: 2022-03-15
Payer: MEDICARE

## 2022-03-15 VITALS — WEIGHT: 141 LBS | HEIGHT: 67.5 IN | BODY MASS INDEX: 21.87 KG/M2

## 2022-03-15 DIAGNOSIS — M70.22 OLECRANON BURSITIS OF LEFT ELBOW: Primary | ICD-10-CM

## 2022-03-15 PROCEDURE — 99203 OFFICE O/P NEW LOW 30 MIN: CPT | Performed by: ORTHOPAEDIC SURGERY

## 2022-03-21 ENCOUNTER — TELEPHONE (OUTPATIENT)
Dept: RHEUMATOLOGY | Facility: CLINIC | Age: 82
End: 2022-03-21

## 2022-03-21 NOTE — TELEPHONE ENCOUNTER
Pt phoned office, would like our office to phone [employer] for update on his cosentyx. Phoned [employer], prescription clarified with pharmacist. Ready for delivery, will call pt.

## 2022-03-30 ENCOUNTER — OFFICE VISIT (OUTPATIENT)
Dept: RHEUMATOLOGY | Facility: CLINIC | Age: 82
End: 2022-03-30
Payer: MEDICARE

## 2022-03-30 VITALS
HEART RATE: 80 BPM | RESPIRATION RATE: 16 BRPM | DIASTOLIC BLOOD PRESSURE: 64 MMHG | SYSTOLIC BLOOD PRESSURE: 124 MMHG | WEIGHT: 138 LBS | BODY MASS INDEX: 21.41 KG/M2 | HEIGHT: 67.5 IN

## 2022-03-30 DIAGNOSIS — L40.0 PLAQUE PSORIASIS: ICD-10-CM

## 2022-03-30 DIAGNOSIS — M25.422 EFFUSION, LEFT ELBOW: ICD-10-CM

## 2022-03-30 DIAGNOSIS — M54.50 CHRONIC LEFT-SIDED LOW BACK PAIN WITHOUT SCIATICA: ICD-10-CM

## 2022-03-30 DIAGNOSIS — G89.29 CHRONIC LEFT-SIDED LOW BACK PAIN WITHOUT SCIATICA: ICD-10-CM

## 2022-03-30 DIAGNOSIS — M51.36 DDD (DEGENERATIVE DISC DISEASE), LUMBAR: ICD-10-CM

## 2022-03-30 DIAGNOSIS — L40.50 PSORIATIC ARTHRITIS (HCC): Primary | ICD-10-CM

## 2022-03-30 DIAGNOSIS — Z79.899 HIGH RISK MEDICATION USE: ICD-10-CM

## 2022-03-30 PROCEDURE — 99215 OFFICE O/P EST HI 40 MIN: CPT | Performed by: INTERNAL MEDICINE

## 2022-04-01 NOTE — PATIENT INSTRUCTIONS
-- get updated MRI of the lumbar spine, mostly to evaluate for possible spinal stenosis  -- continue cosentyx injections  -- remember not all the pain/stiffness/weakness is going to be related to the underlying Psoriatic arthritis.  May be due to osteoarthritis of the knees/hips vs spinal stenosis of the lumbar spine  -- continue taking precautions during the pandemic due to your multiple comorbidities   -- keep me updated if fluid reaccumulates in the the left elbow  -- follow up in 3-4 months or sooner as needed  -- labs prior to next follow up appt  -- call with questions/concerns     Dr. Tyrese Abrams

## 2022-05-13 ENCOUNTER — TELEPHONE (OUTPATIENT)
Dept: RHEUMATOLOGY | Facility: CLINIC | Age: 82
End: 2022-05-13

## 2022-05-13 NOTE — TELEPHONE ENCOUNTER
Received call from radiologist reading MRI lumbar spine- reasons for symptoms but will send formal report when available. There was an area of concern in the abdominal aorta which could represent calcification vs blood vs aneurysm. Recommending CTA abdomen to evaluate further. Called pt and notified of above. Will discuss MRI results in more detail when formal report available. [facility] abdomen ordered through [facility]. Will copy note to cardiology and PCP as well as FYI. Patient and pt's wife verbalized understanding of above instructions. No further questions at this time.     Darron Cardona DO  [facility]  5/13/2022

## 2022-05-13 NOTE — TELEPHONE ENCOUNTER
Received call from radiologist at [address] [place] [address] regarding pt's CTA abd/pelvis. 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 No evidence of aortic dissection or intramural hematoma as was worried with MRI lumbar spine done yesterday. Discussed case with Dr. Mike Vora (cardiology). Recommended pt follow up next week and will set up intervention vs further imaging. Called pt and discussed above. Recommended he call cardiology office today and LVM. Also discussed if symptoms change/worsen or if gets worsened leg pain/swelling or discoloration, he should go immediately to the hospital.     Patient and pt's wife verbalized understanding of above instructions. No further questions at this time.     Samreen Cartagena DO  [facility]  5/13/2022

## 2022-05-13 NOTE — TELEPHONE ENCOUNTER
Call from [facility], pt scheduled today for CTA abdomen. Radiologist recommending CTA abdomen and pelvis. Spoke with Dr. Salinas barrera for verbal telephone order. Spoke with Rachael order for CTA abd/pelvis given.

## 2022-07-27 ENCOUNTER — OFFICE VISIT (OUTPATIENT)
Dept: RHEUMATOLOGY | Facility: CLINIC | Age: 82
End: 2022-07-27
Payer: MEDICARE

## 2022-07-27 VITALS
OXYGEN SATURATION: 97 % | HEART RATE: 82 BPM | RESPIRATION RATE: 16 BRPM | SYSTOLIC BLOOD PRESSURE: 128 MMHG | HEIGHT: 67 IN | BODY MASS INDEX: 21.97 KG/M2 | WEIGHT: 140 LBS | DIASTOLIC BLOOD PRESSURE: 68 MMHG

## 2022-07-27 DIAGNOSIS — M51.36 DDD (DEGENERATIVE DISC DISEASE), LUMBAR: ICD-10-CM

## 2022-07-27 DIAGNOSIS — L40.0 PLAQUE PSORIASIS: ICD-10-CM

## 2022-07-27 DIAGNOSIS — L40.50 PSORIATIC ARTHRITIS (HCC): Primary | ICD-10-CM

## 2022-07-27 DIAGNOSIS — Z79.899 HIGH RISK MEDICATION USE: ICD-10-CM

## 2022-07-27 DIAGNOSIS — M79.675 PAIN OF TOE OF LEFT FOOT: ICD-10-CM

## 2022-07-27 DIAGNOSIS — R29.898 WEAKNESS OF BOTH LOWER EXTREMITIES: ICD-10-CM

## 2022-07-27 PROCEDURE — 99214 OFFICE O/P EST MOD 30 MIN: CPT | Performed by: INTERNAL MEDICINE

## 2022-07-27 RX ORDER — ASPIRIN 81 MG/1
81 TABLET ORAL EVERY OTHER DAY
COMMUNITY

## 2022-07-29 ENCOUNTER — TELEPHONE (OUTPATIENT)
Dept: RHEUMATOLOGY | Facility: CLINIC | Age: 82
End: 2022-07-29

## 2022-07-29 NOTE — TELEPHONE ENCOUNTER
Clarified with patient. He got a [facility] asking if we can release his lab results to see on mychart. Informed we received form [facility] and we do are unable to release them on his mychart. If he got them done at [facility], they would have to release them. He had no further questions.

## 2022-07-29 NOTE — TELEPHONE ENCOUNTER
Please call pt. Labs received from [facility].  Grossly normal CBC, CMP, uric acid and ESR/CRP. Cr 1.13. No signs of gout with uric acid being normal.   Continue present management from my standpoint.     Mati Monroy DO  [facility]  7/29/2022 07/2022  CBC grossly normal   CMP grossly normal  Uric acid 4.4  ESR 2 normal  CRP <1 normal

## 2022-12-02 ENCOUNTER — LAB ENCOUNTER (OUTPATIENT)
Dept: LAB | Age: 82
End: 2022-12-02
Attending: INTERNAL MEDICINE
Payer: MEDICARE

## 2022-12-02 ENCOUNTER — OFFICE VISIT (OUTPATIENT)
Dept: RHEUMATOLOGY | Facility: CLINIC | Age: 82
End: 2022-12-02
Payer: MEDICARE

## 2022-12-02 VITALS
TEMPERATURE: 97 F | DIASTOLIC BLOOD PRESSURE: 70 MMHG | HEIGHT: 67 IN | WEIGHT: 142 LBS | BODY MASS INDEX: 22.29 KG/M2 | RESPIRATION RATE: 16 BRPM | HEART RATE: 60 BPM | SYSTOLIC BLOOD PRESSURE: 116 MMHG | OXYGEN SATURATION: 98 %

## 2022-12-02 DIAGNOSIS — M62.9 DISORDER OF MUSCLE, UNSPECIFIED: ICD-10-CM

## 2022-12-02 DIAGNOSIS — E55.9 VITAMIN D DEFICIENCY: ICD-10-CM

## 2022-12-02 DIAGNOSIS — Z79.899 HIGH RISK MEDICATION USE: ICD-10-CM

## 2022-12-02 DIAGNOSIS — L40.0 PLAQUE PSORIASIS: ICD-10-CM

## 2022-12-02 DIAGNOSIS — M79.675 PAIN OF TOE OF LEFT FOOT: ICD-10-CM

## 2022-12-02 DIAGNOSIS — M51.36 DDD (DEGENERATIVE DISC DISEASE), LUMBAR: ICD-10-CM

## 2022-12-02 DIAGNOSIS — R29.898 WEAKNESS OF BOTH LOWER EXTREMITIES: ICD-10-CM

## 2022-12-02 DIAGNOSIS — L40.50 PSORIATIC ARTHRITIS (HCC): Primary | ICD-10-CM

## 2022-12-02 DIAGNOSIS — L40.50 PSORIATIC ARTHRITIS (HCC): ICD-10-CM

## 2022-12-02 PROCEDURE — 85025 COMPLETE CBC W/AUTO DIFF WBC: CPT

## 2022-12-02 PROCEDURE — 84443 ASSAY THYROID STIM HORMONE: CPT

## 2022-12-02 PROCEDURE — 82306 VITAMIN D 25 HYDROXY: CPT

## 2022-12-02 PROCEDURE — 84550 ASSAY OF BLOOD/URIC ACID: CPT

## 2022-12-02 PROCEDURE — 85652 RBC SED RATE AUTOMATED: CPT

## 2022-12-02 PROCEDURE — 80053 COMPREHEN METABOLIC PANEL: CPT

## 2022-12-02 PROCEDURE — 86140 C-REACTIVE PROTEIN: CPT

## 2022-12-02 PROCEDURE — 83735 ASSAY OF MAGNESIUM: CPT

## 2022-12-02 PROCEDURE — 82607 VITAMIN B-12: CPT

## 2022-12-02 PROCEDURE — 99214 OFFICE O/P EST MOD 30 MIN: CPT | Performed by: INTERNAL MEDICINE

## 2022-12-02 RX ORDER — CHLORAL HYDRATE 500 MG
1000 CAPSULE ORAL DAILY
COMMUNITY

## 2022-12-02 RX ORDER — FLUTICASONE FUROATE 27.5 UG/1
2 SPRAY, METERED NASAL DAILY
COMMUNITY
Start: 2022-06-16

## 2022-12-03 LAB
ALBUMIN SERPL-MCNC: 4 G/DL (ref 3.4–5)
ALBUMIN/GLOB SERPL: 1.5 {RATIO} (ref 1–2)
ALP LIVER SERPL-CCNC: 60 U/L
ALT SERPL-CCNC: 29 U/L
ANION GAP SERPL CALC-SCNC: 3 MMOL/L (ref 0–18)
AST SERPL-CCNC: 15 U/L (ref 15–37)
BASOPHILS # BLD AUTO: 0.03 X10(3) UL (ref 0–0.2)
BASOPHILS NFR BLD AUTO: 0.4 %
BILIRUB SERPL-MCNC: 1 MG/DL (ref 0.1–2)
BUN BLD-MCNC: 18 MG/DL (ref 7–18)
BUN/CREAT SERPL: 17.1 (ref 10–20)
CALCIUM BLD-MCNC: 9.4 MG/DL (ref 8.5–10.1)
CHLORIDE SERPL-SCNC: 101 MMOL/L (ref 98–112)
CO2 SERPL-SCNC: 32 MMOL/L (ref 21–32)
CREAT BLD-MCNC: 1.05 MG/DL
CRP SERPL-MCNC: <0.29 MG/DL (ref ?–0.3)
DEPRECATED RDW RBC AUTO: 48.4 FL (ref 35.1–46.3)
EOSINOPHIL # BLD AUTO: 0.08 X10(3) UL (ref 0–0.7)
EOSINOPHIL NFR BLD AUTO: 1 %
ERYTHROCYTE [DISTWIDTH] IN BLOOD BY AUTOMATED COUNT: 13.4 % (ref 11–15)
ERYTHROCYTE [SEDIMENTATION RATE] IN BLOOD: 5 MM/HR
FASTING STATUS PATIENT QL REPORTED: NO
GFR SERPLBLD BASED ON 1.73 SQ M-ARVRAT: 70 ML/MIN/1.73M2 (ref 60–?)
GLOBULIN PLAS-MCNC: 2.6 G/DL (ref 2.8–4.4)
GLUCOSE BLD-MCNC: 93 MG/DL (ref 70–99)
HCT VFR BLD AUTO: 45.9 %
HGB BLD-MCNC: 15.1 G/DL
IMM GRANULOCYTES # BLD AUTO: 0.02 X10(3) UL (ref 0–1)
IMM GRANULOCYTES NFR BLD: 0.3 %
LYMPHOCYTES # BLD AUTO: 1.63 X10(3) UL (ref 1–4)
LYMPHOCYTES NFR BLD AUTO: 20.7 %
MAGNESIUM SERPL-MCNC: 2.1 MG/DL (ref 1.6–2.6)
MCH RBC QN AUTO: 32 PG (ref 26–34)
MCHC RBC AUTO-ENTMCNC: 32.9 G/DL (ref 31–37)
MCV RBC AUTO: 97.2 FL
MONOCYTES # BLD AUTO: 0.76 X10(3) UL (ref 0.1–1)
MONOCYTES NFR BLD AUTO: 9.7 %
NEUTROPHILS # BLD AUTO: 5.35 X10 (3) UL (ref 1.5–7.7)
NEUTROPHILS # BLD AUTO: 5.35 X10(3) UL (ref 1.5–7.7)
NEUTROPHILS NFR BLD AUTO: 67.9 %
OSMOLALITY SERPL CALC.SUM OF ELEC: 284 MOSM/KG (ref 275–295)
PLATELET # BLD AUTO: 165 10(3)UL (ref 150–450)
POTASSIUM SERPL-SCNC: 4.7 MMOL/L (ref 3.5–5.1)
PROT SERPL-MCNC: 6.6 G/DL (ref 6.4–8.2)
RBC # BLD AUTO: 4.72 X10(6)UL
SODIUM SERPL-SCNC: 136 MMOL/L (ref 136–145)
TSI SER-ACNC: 0.67 MIU/ML (ref 0.36–3.74)
URATE SERPL-MCNC: 5 MG/DL
VIT B12 SERPL-MCNC: 677 PG/ML (ref 193–986)
VIT D+METAB SERPL-MCNC: 65.6 NG/ML (ref 30–100)
WBC # BLD AUTO: 7.9 X10(3) UL (ref 4–11)

## 2022-12-28 ENCOUNTER — TELEPHONE (OUTPATIENT)
Dept: RHEUMATOLOGY | Facility: CLINIC | Age: 82
End: 2022-12-28

## 2022-12-28 DIAGNOSIS — L40.50 PSORIATIC ARTHRITIS (HCC): Primary | ICD-10-CM

## 2022-12-28 RX ORDER — SECUKINUMAB 150 MG/ML
150 INJECTION SUBCUTANEOUS
Qty: 1 EACH | Refills: 2 | Status: SHIPPED | OUTPATIENT
Start: 2022-12-28 | End: 2023-01-25

## 2023-01-06 ENCOUNTER — TELEPHONE (OUTPATIENT)
Dept: RHEUMATOLOGY | Facility: CLINIC | Age: 83
End: 2023-01-06

## 2023-01-06 NOTE — TELEPHONE ENCOUNTER
Fax from [employer]. Pt has been approved to receive Cosentyx until 12/31/23 at no cost to patient.  ID# [id number]

## 2023-01-27 ENCOUNTER — TELEPHONE (OUTPATIENT)
Dept: RHEUMATOLOGY | Facility: CLINIC | Age: 83
End: 2023-01-27

## 2023-01-27 DIAGNOSIS — L40.50 PSORIATIC ARTHRITIS (HCC): Primary | ICD-10-CM

## 2023-01-27 RX ORDER — SECUKINUMAB 150 MG/ML
150 INJECTION SUBCUTANEOUS
Qty: 1 EACH | Refills: 1 | Status: SHIPPED | OUTPATIENT
Start: 2023-01-27 | End: 2023-02-24

## 2023-02-06 ENCOUNTER — TELEPHONE (OUTPATIENT)
Dept: RHEUMATOLOGY | Facility: CLINIC | Age: 83
End: 2023-02-06

## 2023-02-06 DIAGNOSIS — L40.50 PSORIATIC ARTHRITIS (HCC): ICD-10-CM

## 2023-02-06 RX ORDER — SECUKINUMAB 150 MG/ML
150 INJECTION SUBCUTANEOUS
Qty: 1 EACH | Refills: 1 | Status: SHIPPED | OUTPATIENT
Start: 2023-02-06 | End: 2023-03-06

## 2023-02-13 ENCOUNTER — TELEPHONE (OUTPATIENT)
Dept: RHEUMATOLOGY | Facility: CLINIC | Age: 83
End: 2023-02-13

## 2023-02-13 DIAGNOSIS — L40.50 PSORIATIC ARTHRITIS (HCC): ICD-10-CM

## 2023-02-13 RX ORDER — SECUKINUMAB 150 MG/ML
150 INJECTION SUBCUTANEOUS
Qty: 1 EACH | Refills: 1 | Status: SHIPPED | OUTPATIENT
Start: 2023-02-13 | End: 2023-03-13

## 2023-02-13 NOTE — TELEPHONE ENCOUNTER
Pt called office, states pharmacy has not yet received prescription. Phoned [facility]. Prescription [facility], address given and sent to correct location.

## 2023-03-03 ENCOUNTER — OFFICE VISIT (OUTPATIENT)
Dept: RHEUMATOLOGY | Facility: CLINIC | Age: 83
End: 2023-03-03
Payer: MEDICARE

## 2023-03-03 VITALS
WEIGHT: 144.81 LBS | HEIGHT: 67 IN | SYSTOLIC BLOOD PRESSURE: 128 MMHG | RESPIRATION RATE: 16 BRPM | TEMPERATURE: 97 F | BODY MASS INDEX: 22.73 KG/M2 | DIASTOLIC BLOOD PRESSURE: 66 MMHG

## 2023-03-03 DIAGNOSIS — L40.50 PSORIATIC ARTHRITIS (HCC): Primary | ICD-10-CM

## 2023-03-03 DIAGNOSIS — R29.898 WEAKNESS OF BOTH LOWER EXTREMITIES: ICD-10-CM

## 2023-03-03 DIAGNOSIS — M51.36 DDD (DEGENERATIVE DISC DISEASE), LUMBAR: ICD-10-CM

## 2023-03-03 DIAGNOSIS — L40.0 PLAQUE PSORIASIS: ICD-10-CM

## 2023-03-03 DIAGNOSIS — Z79.899 HIGH RISK MEDICATION USE: ICD-10-CM

## 2023-03-03 PROCEDURE — 99214 OFFICE O/P EST MOD 30 MIN: CPT | Performed by: INTERNAL MEDICINE

## 2023-03-03 RX ORDER — CEPHALEXIN 500 MG/1
500 CAPSULE ORAL 3 TIMES DAILY
Qty: 30 CAPSULE | Refills: 0 | COMMUNITY
Start: 2023-02-28 | End: 2023-03-10

## 2023-04-08 NOTE — TELEPHONE ENCOUNTER
Left message to return call to the urgent care.   Patient application and provider prescription for Cosentyx faxed to [employer] for foundation assistance. PA approved through [address] copay $3100/mo.

## 2023-06-09 ENCOUNTER — OFFICE VISIT (OUTPATIENT)
Dept: RHEUMATOLOGY | Facility: CLINIC | Age: 83
End: 2023-06-09
Payer: MEDICARE

## 2023-06-09 VITALS
SYSTOLIC BLOOD PRESSURE: 120 MMHG | OXYGEN SATURATION: 99 % | WEIGHT: 138 LBS | BODY MASS INDEX: 21.66 KG/M2 | RESPIRATION RATE: 16 BRPM | HEART RATE: 72 BPM | DIASTOLIC BLOOD PRESSURE: 64 MMHG | HEIGHT: 67 IN | TEMPERATURE: 98 F

## 2023-06-09 DIAGNOSIS — Z11.1 SCREENING FOR TUBERCULOSIS: ICD-10-CM

## 2023-06-09 DIAGNOSIS — L40.50 PSORIATIC ARTHRITIS (HCC): Primary | ICD-10-CM

## 2023-06-09 DIAGNOSIS — L40.0 PLAQUE PSORIASIS: ICD-10-CM

## 2023-06-09 DIAGNOSIS — M51.36 DDD (DEGENERATIVE DISC DISEASE), LUMBAR: ICD-10-CM

## 2023-06-09 DIAGNOSIS — E55.9 VITAMIN D DEFICIENCY: ICD-10-CM

## 2023-06-09 DIAGNOSIS — Z79.899 HIGH RISK MEDICATION USE: ICD-10-CM

## 2023-06-09 PROCEDURE — 99215 OFFICE O/P EST HI 40 MIN: CPT | Performed by: INTERNAL MEDICINE

## 2023-06-09 RX ORDER — VALSARTAN 80 MG/1
80 TABLET ORAL DAILY
COMMUNITY
Start: 2023-04-25

## 2023-06-12 DIAGNOSIS — L40.50 PSORIATIC ARTHRITIS (HCC): Primary | ICD-10-CM

## 2023-06-12 RX ORDER — SECUKINUMAB 150 MG/ML
300 INJECTION SUBCUTANEOUS
Qty: 2 EACH | Refills: 2 | Status: SHIPPED | OUTPATIENT
Start: 2023-06-12 | End: 2023-07-10

## 2023-07-12 ENCOUNTER — APPOINTMENT (OUTPATIENT)
Dept: GENERAL RADIOLOGY | Facility: HOSPITAL | Age: 83
End: 2023-07-12
Attending: EMERGENCY MEDICINE
Payer: MEDICARE

## 2023-07-12 ENCOUNTER — APPOINTMENT (OUTPATIENT)
Dept: CT IMAGING | Facility: HOSPITAL | Age: 83
End: 2023-07-12
Attending: EMERGENCY MEDICINE
Payer: MEDICARE

## 2023-07-12 ENCOUNTER — HOSPITAL ENCOUNTER (OUTPATIENT)
Facility: HOSPITAL | Age: 83
Setting detail: OBSERVATION
Discharge: HOME HEALTH | End: 2023-07-14
Attending: EMERGENCY MEDICINE | Admitting: HOSPITALIST
Payer: MEDICARE

## 2023-07-12 DIAGNOSIS — R55 SYNCOPE, VASOVAGAL: ICD-10-CM

## 2023-07-12 DIAGNOSIS — L40.50 PSORIATIC ARTHRITIS (HCC): ICD-10-CM

## 2023-07-12 DIAGNOSIS — S01.01XA SCALP LACERATION, INITIAL ENCOUNTER: ICD-10-CM
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
## 2023-07-12 DIAGNOSIS — S12.9XXA CLOSED FRACTURE OF SPINOUS PROCESS OF CERVICAL VERTEBRA, INITIAL ENCOUNTER (HCC): ICD-10-CM

## 2023-07-12 DIAGNOSIS — T14.8XXA HEMATOMA: ICD-10-CM

## 2023-07-12 PROBLEM — E87.1 HYPONATREMIA: Status: ACTIVE | Noted: 2023-07-12

## 2023-07-12 PROBLEM — R73.9 HYPERGLYCEMIA: Status: ACTIVE | Noted: 2023-07-12

## 2023-07-12 LAB
ALBUMIN SERPL-MCNC: 3.9 G/DL (ref 3.4–5)
ALBUMIN/GLOB SERPL: 1.2 {RATIO} (ref 1–2)
ALP LIVER SERPL-CCNC: 53 U/L
ALT SERPL-CCNC: 24 U/L
ANION GAP SERPL CALC-SCNC: 5 MMOL/L (ref 0–18)
AST SERPL-CCNC: 23 U/L (ref 15–37)
ATRIAL RATE: 75 BPM
BASOPHILS # BLD AUTO: 0.03 X10(3) UL (ref 0–0.2)
BASOPHILS NFR BLD AUTO: 0.3 %
BILIRUB SERPL-MCNC: 1.6 MG/DL (ref 0.1–2)
BUN BLD-MCNC: 15 MG/DL (ref 7–18)
CALCIUM BLD-MCNC: 9.2 MG/DL (ref 8.5–10.1)
CHLORIDE SERPL-SCNC: 102 MMOL/L (ref 98–112)
CO2 SERPL-SCNC: 25 MMOL/L (ref 21–32)
CREAT BLD-MCNC: 1.03 MG/DL
EOSINOPHIL # BLD AUTO: 0.09 X10(3) UL (ref 0–0.7)
EOSINOPHIL NFR BLD AUTO: 1 %
ERYTHROCYTE [DISTWIDTH] IN BLOOD BY AUTOMATED COUNT: 13.5 %
GFR SERPLBLD BASED ON 1.73 SQ M-ARVRAT: 72 ML/MIN/1.73M2 (ref 60–?)
GLOBULIN PLAS-MCNC: 3.3 G/DL (ref 2.8–4.4)
GLUCOSE BLD-MCNC: 108 MG/DL (ref 70–99)
HCT VFR BLD AUTO: 48 %
HGB BLD-MCNC: 16.8 G/DL
IMM GRANULOCYTES # BLD AUTO: 0.08 X10(3) UL (ref 0–1)
IMM GRANULOCYTES NFR BLD: 0.9 %
LYMPHOCYTES # BLD AUTO: 2.12 X10(3) UL (ref 1–4)
LYMPHOCYTES NFR BLD AUTO: 24.4 %
MCH RBC QN AUTO: 31.9 PG (ref 26–34)
MCHC RBC AUTO-ENTMCNC: 35 G/DL (ref 31–37)
MCV RBC AUTO: 91.3 FL
MONOCYTES # BLD AUTO: 1.13 X10(3) UL (ref 0.1–1)
MONOCYTES NFR BLD AUTO: 13 %
NEUTROPHILS # BLD AUTO: 5.24 X10 (3) UL (ref 1.5–7.7)
NEUTROPHILS # BLD AUTO: 5.24 X10(3) UL (ref 1.5–7.7)
NEUTROPHILS NFR BLD AUTO: 60.4 %
OSMOLALITY SERPL CALC.SUM OF ELEC: 275 MOSM/KG (ref 275–295)
P AXIS: 56 DEGREES
P-R INTERVAL: 180 MS
PLATELET # BLD AUTO: 207 10(3)UL (ref 150–450)
POTASSIUM SERPL-SCNC: 4.7 MMOL/L (ref 3.5–5.1)
PROT SERPL-MCNC: 7.2 G/DL (ref 6.4–8.2)
Q-T INTERVAL: 382 MS
QRS DURATION: 84 MS
QTC CALCULATION (BEZET): 426 MS
R AXIS: -68 DEGREES
RBC # BLD AUTO: 5.26 X10(6)UL
SODIUM SERPL-SCNC: 132 MMOL/L (ref 136–145)
T AXIS: 54 DEGREES
TROPONIN I HIGH SENSITIVITY: 7 NG/L
VENTRICULAR RATE: 75 BPM
WBC # BLD AUTO: 8.7 X10(3) UL (ref 4–11)

## 2023-07-12 PROCEDURE — 71045 X-RAY EXAM CHEST 1 VIEW: CPT | Performed by: EMERGENCY MEDICINE

## 2023-07-12 PROCEDURE — 80053 COMPREHEN METABOLIC PANEL: CPT | Performed by: EMERGENCY MEDICINE

## 2023-07-12 PROCEDURE — 84484 ASSAY OF TROPONIN QUANT: CPT | Performed by: EMERGENCY MEDICINE

## 2023-07-12 PROCEDURE — 72125 CT NECK SPINE W/O DYE: CPT | Performed by: EMERGENCY MEDICINE

## 2023-07-12 PROCEDURE — 85025 COMPLETE CBC W/AUTO DIFF WBC: CPT | Performed by: EMERGENCY MEDICINE

## 2023-07-12 PROCEDURE — 93005 ELECTROCARDIOGRAM TRACING: CPT

## 2023-07-12 PROCEDURE — 70450 CT HEAD/BRAIN W/O DYE: CPT | Performed by: EMERGENCY MEDICINE

## 2023-07-12 RX ORDER — ACETAMINOPHEN 500 MG
500 TABLET ORAL EVERY 4 HOURS PRN
Status: DISCONTINUED | OUTPATIENT
Start: 2023-07-12 | End: 2023-07-14

## 2023-07-12 RX ORDER — MELATONIN
3 NIGHTLY PRN
Status: DISCONTINUED | OUTPATIENT
Start: 2023-07-12 | End: 2023-07-14

## 2023-07-12 RX ORDER — FLUTICASONE PROPIONATE 50 MCG
2 SPRAY, SUSPENSION (ML) NASAL DAILY
Status: DISCONTINUED | OUTPATIENT
Start: 2023-07-12 | End: 2023-07-14

## 2023-07-12 RX ORDER — METOCLOPRAMIDE HYDROCHLORIDE 5 MG/ML
10 INJECTION INTRAMUSCULAR; INTRAVENOUS EVERY 8 HOURS PRN
Status: DISCONTINUED | OUTPATIENT
Start: 2023-07-12 | End: 2023-07-14

## 2023-07-12 RX ORDER — HYDROMORPHONE HYDROCHLORIDE 1 MG/ML
0.5 INJECTION, SOLUTION INTRAMUSCULAR; INTRAVENOUS; SUBCUTANEOUS EVERY 30 MIN PRN
Status: COMPLETED | OUTPATIENT
Start: 2023-07-12 | End: 2023-07-13

## 2023-07-12 RX ORDER — ENOXAPARIN SODIUM 100 MG/ML
40 INJECTION SUBCUTANEOUS DAILY
Status: DISCONTINUED | OUTPATIENT
Start: 2023-07-13 | End: 2023-07-14

## 2023-07-12 RX ORDER — LIDOCAINE HYDROCHLORIDE AND EPINEPHRINE 10; 10 MG/ML; UG/ML
1 INJECTION, SOLUTION INFILTRATION; PERINEURAL ONCE
Status: COMPLETED | OUTPATIENT
Start: 2023-07-12 | End: 2023-07-12

## 2023-07-12 RX ORDER — SODIUM CHLORIDE 9 MG/ML
INJECTION, SOLUTION INTRAVENOUS CONTINUOUS
Status: ACTIVE | OUTPATIENT
Start: 2023-07-12 | End: 2023-07-12

## 2023-07-12 RX ORDER — LORAZEPAM 1 MG/1
1 TABLET ORAL 2 TIMES DAILY PRN
Status: DISCONTINUED | OUTPATIENT
Start: 2023-07-12 | End: 2023-07-14

## 2023-07-12 RX ORDER — SENNOSIDES 8.6 MG
17.2 TABLET ORAL NIGHTLY PRN
Status: DISCONTINUED | OUTPATIENT
Start: 2023-07-12 | End: 2023-07-14

## 2023-07-12 RX ORDER — TRAMADOL HYDROCHLORIDE 50 MG/1
TABLET ORAL EVERY 6 HOURS PRN
Qty: 10 TABLET | Refills: 0 | Status: SHIPPED | OUTPATIENT
Start: 2023-07-12 | End: 2023-07-14

## 2023-07-12 RX ORDER — ASPIRIN 81 MG/1
81 TABLET ORAL EVERY OTHER DAY
Status: DISCONTINUED | OUTPATIENT
Start: 2023-07-12 | End: 2023-07-14

## 2023-07-12 RX ORDER — POLYETHYLENE GLYCOL 3350 17 G/17G
17 POWDER, FOR SOLUTION ORAL DAILY PRN
Status: DISCONTINUED | OUTPATIENT
Start: 2023-07-12 | End: 2023-07-14

## 2023-07-12 RX ORDER — ONDANSETRON 2 MG/ML
4 INJECTION INTRAMUSCULAR; INTRAVENOUS EVERY 6 HOURS PRN
Status: DISCONTINUED | OUTPATIENT
Start: 2023-07-12 | End: 2023-07-14

## 2023-07-12 RX ORDER — SODIUM CHLORIDE 9 MG/ML
INJECTION, SOLUTION INTRAVENOUS CONTINUOUS
Status: DISCONTINUED | OUTPATIENT
Start: 2023-07-12 | End: 2023-07-13

## 2023-07-12 RX ORDER — BISACODYL 10 MG
10 SUPPOSITORY, RECTAL RECTAL
Status: DISCONTINUED | OUTPATIENT
Start: 2023-07-12 | End: 2023-07-14

## 2023-07-12 RX ORDER — ROSUVASTATIN CALCIUM 20 MG/1
20 TABLET, COATED ORAL NIGHTLY
Status: DISCONTINUED | OUTPATIENT
Start: 2023-07-12 | End: 2023-07-14

## 2023-07-12 RX ORDER — ONDANSETRON 2 MG/ML
4 INJECTION INTRAMUSCULAR; INTRAVENOUS EVERY 4 HOURS PRN
Status: DISCONTINUED | OUTPATIENT
Start: 2023-07-12 | End: 2023-07-12 | Stop reason: ALTCHOICE

## 2023-07-12 RX ORDER — LIDOCAINE HYDROCHLORIDE AND EPINEPHRINE 10; 10 MG/ML; UG/ML
INJECTION, SOLUTION INFILTRATION; PERINEURAL
Status: COMPLETED
Start: 2023-07-12 | End: 2023-07-12

## 2023-07-12 RX ORDER — ENEMA 19; 7 G/133ML; G/133ML
1 ENEMA RECTAL ONCE AS NEEDED
Status: DISCONTINUED | OUTPATIENT
Start: 2023-07-12 | End: 2023-07-14

## 2023-07-12 RX ORDER — HYDROMORPHONE HYDROCHLORIDE 1 MG/ML
0.5 INJECTION, SOLUTION INTRAMUSCULAR; INTRAVENOUS; SUBCUTANEOUS EVERY 30 MIN PRN
Status: DISCONTINUED | OUTPATIENT
Start: 2023-07-12 | End: 2023-07-12 | Stop reason: ALTCHOICE

## 2023-07-12 NOTE — ED QUICK NOTES
Orders for admission, patient is aware of plan and ready to go upstairs. Any questions, please call ED RN Reji Bingham at extension [phone]. Patient Covid vaccination status: Fully vaccinated     COVID Test Ordered in ED: None    COVID Suspicion at Admission: N/A    Running Infusions:      Mental Status/LOC at time of transport: A/O X4    Other pertinent information: Wife, Daughter and Granddaughter at bedside. Patient dizzy with postural changes.   CIWA score: N/A   NIH score:  N/A

## 2023-07-12 NOTE — ED QUICK NOTES
RN to bedside. Patient assisted to wheelchair for discharge when patient had syncopal event. Patient became pale, diaphoretic and lethargic. Patient BP noted to be 85/45. Patient assisted back to bed, placed back on monitors. Dr. Aaron Stringer to bedside. IV reinserted and fluids hung per Dr. Rajinder Villanueva direction. Patient color improved. Patient states \"I just dont feel good. Im dizzy. \" Patient's family requesting to speak to Dr. Aaron Stringer about potential admission, Dr. Aaron Stringer notified. Warm blanket provided. Patient resting comfortably on cot at this time. Call light within reach. Bed in lowest position and wheels locked.

## 2023-07-12 NOTE — ED QUICK NOTES
Rounding Completed    Plan of Care reviewed. Waiting for imaging and lab results. Elimination needs assessed. Provided warm blanket. Bed is locked and in lowest position. Call light within reach.

## 2023-07-12 NOTE — ED QUICK NOTES
Patient's daughter from [place] called at this time looking for updates. Patient requesting no information be provided at this time and that he will call family once discharged.  Message relayed to daughter per patient's request.

## 2023-07-12 NOTE — ED QUICK NOTES
Rounding Completed. Dinner tray provided to patient at this time. Plan of Care reviewed. Waiting for inpatient bed. Elimination needs assessed. Provided warm blanket. Bed is locked and in lowest position. Call light within reach.

## 2023-07-12 NOTE — ED INITIAL ASSESSMENT (HPI)
Patient was in his home, took his inhaler and held his breath when he passed out. Patients wife states she thinks he might have hit his head on the kitchen counter. Patient has a lac noted to the back of his head. C-collar in place. Patient currently a/ox4. Respirations even and non labored.

## 2023-07-12 NOTE — ED PROVIDER NOTES
Procedure Note:    Laceration repair: Prior to procedure, documentation was reviewed, informed consent was obtained, appropriate equipment was present and a time out was performed to identify the correct patient, procedure and site. Verbal consent was obtained from the patient. The wound was copiously irrigated with normal saline. The wound was prepped and draped in the normal sterile fashion. The wound was anesthetized using Lidocaine 1% with epi   The wound was explored for foreign bodies and none were found. The wound measured 8cm  The edges were reapproximated using 4.0 nylon (9 sutures placed)    The edges were well approximated. Bleeding was well-controlled. The patient tolerated the procedure well. Bandage was applied.     Procedure was performed by Tommy BENNETT under my direct supervision

## 2023-07-13 ENCOUNTER — APPOINTMENT (OUTPATIENT)
Dept: CV DIAGNOSTICS | Facility: HOSPITAL | Age: 83
End: 2023-07-13
Attending: HOSPITALIST
Payer: MEDICARE

## 2023-07-13 LAB
ALBUMIN SERPL-MCNC: 3.2 G/DL (ref 3.4–5)
ALBUMIN/GLOB SERPL: 1.2 {RATIO} (ref 1–2)
ALP LIVER SERPL-CCNC: 50 U/L
ALT SERPL-CCNC: 26 U/L
ANION GAP SERPL CALC-SCNC: 7 MMOL/L (ref 0–18)
AST SERPL-CCNC: 43 U/L (ref 15–37)
BASOPHILS # BLD AUTO: 0.01 X10(3) UL (ref 0–0.2)
BASOPHILS NFR BLD AUTO: 0.1 %
BILIRUB SERPL-MCNC: 1.6 MG/DL (ref 0.1–2)
BILIRUB UR QL STRIP.AUTO: NEGATIVE
BUN BLD-MCNC: 11 MG/DL (ref 7–18)
CALCIUM BLD-MCNC: 8.3 MG/DL (ref 8.5–10.1)
CHLORIDE SERPL-SCNC: 104 MMOL/L (ref 98–112)
CLARITY UR REFRACT.AUTO: CLEAR
CO2 SERPL-SCNC: 24 MMOL/L (ref 21–32)
CREAT BLD-MCNC: 0.79 MG/DL
EOSINOPHIL # BLD AUTO: 0.04 X10(3) UL (ref 0–0.7)
EOSINOPHIL NFR BLD AUTO: 0.4 %
ERYTHROCYTE [DISTWIDTH] IN BLOOD BY AUTOMATED COUNT: 13.3 %
GFR SERPLBLD BASED ON 1.73 SQ M-ARVRAT: 88 ML/MIN/1.73M2 (ref 60–?)
GLOBULIN PLAS-MCNC: 2.7 G/DL (ref 2.8–4.4)
GLUCOSE BLD-MCNC: 106 MG/DL (ref 70–99)
GLUCOSE UR STRIP.AUTO-MCNC: NEGATIVE MG/DL
HCT VFR BLD AUTO: 40.8 %
HGB BLD-MCNC: 14.4 G/DL
IMM GRANULOCYTES # BLD AUTO: 0.04 X10(3) UL (ref 0–1)
IMM GRANULOCYTES NFR BLD: 0.4 %
LEUKOCYTE ESTERASE UR QL STRIP.AUTO: NEGATIVE
LYMPHOCYTES # BLD AUTO: 1.36 X10(3) UL (ref 1–4)
LYMPHOCYTES NFR BLD AUTO: 13.1 %
MAGNESIUM SERPL-MCNC: 2 MG/DL (ref 1.6–2.6)
MCH RBC QN AUTO: 32.4 PG (ref 26–34)
MCHC RBC AUTO-ENTMCNC: 35.3 G/DL (ref 31–37)
MCV RBC AUTO: 91.9 FL
MONOCYTES # BLD AUTO: 1.37 X10(3) UL (ref 0.1–1)
MONOCYTES NFR BLD AUTO: 13.2 %
NEUTROPHILS # BLD AUTO: 7.53 X10 (3) UL (ref 1.5–7.7)
NEUTROPHILS # BLD AUTO: 7.53 X10(3) UL (ref 1.5–7.7)
NEUTROPHILS NFR BLD AUTO: 72.8 %
NITRITE UR QL STRIP.AUTO: NEGATIVE
OSMOLALITY SERPL CALC.SUM OF ELEC: 280 MOSM/KG (ref 275–295)
PH UR STRIP.AUTO: 7 [PH] (ref 5–8)
PLATELET # BLD AUTO: 171 10(3)UL (ref 150–450)
POTASSIUM SERPL-SCNC: 3.8 MMOL/L (ref 3.5–5.1)
PROT SERPL-MCNC: 5.9 G/DL (ref 6.4–8.2)
PROT UR STRIP.AUTO-MCNC: NEGATIVE MG/DL
RBC # BLD AUTO: 4.44 X10(6)UL
RBC UR QL AUTO: NEGATIVE
SODIUM SERPL-SCNC: 135 MMOL/L (ref 136–145)
SP GR UR STRIP.AUTO: 1.01 (ref 1–1.03)
UROBILINOGEN UR STRIP.AUTO-MCNC: <2 MG/DL
WBC # BLD AUTO: 10.4 X10(3) UL (ref 4–11)

## 2023-07-13 PROCEDURE — 97165 OT EVAL LOW COMPLEX 30 MIN: CPT

## 2023-07-13 PROCEDURE — 93306 TTE W/DOPPLER COMPLETE: CPT | Performed by: HOSPITALIST

## 2023-07-13 PROCEDURE — 83735 ASSAY OF MAGNESIUM: CPT | Performed by: HOSPITALIST

## 2023-07-13 PROCEDURE — 97116 GAIT TRAINING THERAPY: CPT

## 2023-07-13 PROCEDURE — 81003 URINALYSIS AUTO W/O SCOPE: CPT | Performed by: HOSPITALIST

## 2023-07-13 PROCEDURE — 97161 PT EVAL LOW COMPLEX 20 MIN: CPT

## 2023-07-13 PROCEDURE — 94799 UNLISTED PULMONARY SVC/PX: CPT

## 2023-07-13 PROCEDURE — 97535 SELF CARE MNGMENT TRAINING: CPT

## 2023-07-13 PROCEDURE — 85025 COMPLETE CBC W/AUTO DIFF WBC: CPT | Performed by: HOSPITALIST

## 2023-07-13 PROCEDURE — 97530 THERAPEUTIC ACTIVITIES: CPT

## 2023-07-13 PROCEDURE — 80053 COMPREHEN METABOLIC PANEL: CPT | Performed by: HOSPITALIST

## 2023-07-13 RX ORDER — LOSARTAN POTASSIUM 50 MG/1
50 TABLET ORAL DAILY
Status: DISCONTINUED | OUTPATIENT
Start: 2023-07-13 | End: 2023-07-14

## 2023-07-13 RX ORDER — HYDROCODONE BITARTRATE AND ACETAMINOPHEN 5; 325 MG/1; MG/1
1 TABLET ORAL EVERY 4 HOURS PRN
Status: DISCONTINUED | OUTPATIENT
Start: 2023-07-13 | End: 2023-07-14

## 2023-07-13 RX ORDER — HYDROCODONE BITARTRATE AND ACETAMINOPHEN 5; 325 MG/1; MG/1
2 TABLET ORAL EVERY 4 HOURS PRN
Status: DISCONTINUED | OUTPATIENT
Start: 2023-07-13 | End: 2023-07-14

## 2023-07-13 NOTE — DISCHARGE INSTRUCTIONS
Sometimes managing your health at home requires assistance. The [facility] team has recognized your preference to use [facility]. They can be reached by phone at [phone]. The fax number for your reference is [phone]. A representative from the home health agency will contact you or your family to schedule your first visit. [facility] will provide Hospital bed.   Phone number is [phone]

## 2023-07-13 NOTE — CM/SW NOTE
MSW met with pt and his sister at bedside. Updated choice list provided, MSW asked for a choice today.

## 2023-07-13 NOTE — PLAN OF CARE
NURSING ADMISSION NOTE      Patient admitted via Cart  Oriented to room. Safety precautions initiated. Bed in low position. Call light in reach. Assumed care of 80 y/o male @2000  A/Ox4, RA, NSR-Tele  Regular diet, urinal @bedside  RAC-infusing 0.9 @! 100 ml/hr  PRN Dilaudid given for pain MAR  HTN meds on hold for hypotension until Cards evaluates  Safety prec maintained and all needs met

## 2023-07-13 NOTE — HOME CARE LIAISON
Received referral via [facility] for Home Health services. Spoke w/ patients spouse and provided with list of [facility] providers from [address] [place] [address], choice is [facility]. Agency reserved in [address] [place] [address] Road and contact information placed on AVS.Financial interest disclosure provided. Notified MARITA.

## 2023-07-13 NOTE — PHYSICAL THERAPY NOTE
PHYSICAL THERAPY EVALUATION - INPATIENT     Room Number: 9899/8015-T  Evaluation Date: 7/13/2023  Type of Evaluation: Initial  Physician Order: PT Eval and Treat    Presenting Problem: fall, syncope, C4 spinous process fracture, L paraspinal musculature hematoma  Co-Morbidities : CAD, PAD, COPD, HTN, HLD, PSVT s/p ablation  Reason for Therapy: Mobility Dysfunction and Discharge Planning    History related to current admission: Patient is a 79year old male admitted on 7/12/2023 from home for syncope, fall. Pt diagnosed with C4 spinous process fracture, L paraspinal musculature hematoma. ASSESSMENT   In this PT evaluation, the patient presents with the following impairments decreased strength, functional mobility, trunk control, endurance, balance, increased pain. These impairments and comorbidities manifest themselves as functional limitations in independent bed mobility, transfers, and gait, stairs. The patient is below baseline and would benefit from skilled inpatient PT to address the above deficits to assist patient in returning to prior to level of function. Functional outcome measures completed include AMPAC. The AM-PAC '6-Clicks' Inpatient Basic Mobility Short Form was completed and this patient is demonstrating a Approx Degree of Impairment: 54.16%  degree of impairment in mobility. Research supports that patients with this level of impairment may benefit from ANKITA, however pt main functional mobility limitation arising from performing supine to sit from flat bed - pt able to sleep in recliner or considering renting hospital bed for home to ease this transfer. Recommend HHPT. DISCHARGE RECOMMENDATIONS  PT Discharge Recommendations: Home with home health PT; Intermittent Supervision    PLAN  PT Treatment Plan: Body mechanics; Bed mobility; Endurance; Energy conservation;Patient education; Family education;Gait training;Balance training;Transfer training;Strengthening  Rehab Potential : Good  Frequency (Obs): 3-5x/week  Number of Visits to Meet Established Goals: 3      CURRENT GOALS    Goal #1 Patient is able to demonstrate supine - sit EOB @ level: supervision     Goal #2 Patient is able to demonstrate transfers Sit to/from Stand at assistance level: modified independent     Goal #3 Patient is able to ambulate 150 feet with assist device:  LRAD  at assistance level: supervision     Goal #4 Assess stairs as appropriate   Goal #5    Goal #6    Goal Comments: Goals established on 2023    HOME SITUATION  Type of Home: House   Home Layout: Multi-level  Stairs to Enter : 2  Railing: No  Stairs to Bedroom: 10  Railing: Yes    Lives With: Spouse  Drives: Yes  Patient Owned Equipment: None       Prior Level of [place]: Pt typically indep with ADLs and mobility. Drives. Supportive family. Lives with spouse. Dtr and granddaughter live within minutes, DIL (family medicine physician) lives 30min away. Family looking into possible rental of hospital bed if not covered by insurance. Typically stays 2nd floor of house but likely will stay on main floor when discharged home. SUBJECTIVE  \"The pain med was useless, it was like a glass of water\"      OBJECTIVE  Precautions: Cervical brace;Bed/chair alarm;Spine (Soft collar)  Fall Risk: High fall risk    WEIGHT BEARING RESTRICTION  Weight Bearing Restriction: None                PAIN ASSESSMENT  Ratin  Location: neck  Management Techniques:  Body mechanics    COGNITION  Safety Judgement:  decreased awareness of need for assistance and decreased awareness of need for safety  Awareness of Errors:  decreased awareness of errors   Awareness of Deficits:  decreased awareness of deficits  Problem Solving:  assistance required to identify errors made, assistance required to generate solutions, and assistance required to implement solutions    RANGE OF MOTION AND STRENGTH ASSESSMENT  See OT note for UE assessment    Lower extremity ROM is within functional limits     Lower extremity strength is grossly 4-/5      BALANCE  Static Sitting: Poor +  Dynamic Sitting: Poor +  Static Standing: Poor +  Dynamic Standing: Poor +    ADDITIONAL TESTS                                    ACTIVITY TOLERANCE                         O2 WALK       NEUROLOGICAL FINDINGS                        AM-PAC '6-Clicks' INPATIENT SHORT FORM - BASIC MOBILITY  How much difficulty does the patient currently have. .. Patient Difficulty: Turning over in bed (including adjusting bedclothes, sheets and blankets)?: A Lot   Patient Difficulty: Sitting down on and standing up from a chair with arms (e.g., wheelchair, bedside commode, etc.): A Little   Patient Difficulty: Moving from lying on back to sitting on the side of the bed?: A Lot   How much help from another person does the patient currently need. .. Help from Another: Moving to and from a bed to a chair (including a wheelchair)?: A Little   Help from Another: Need to walk in hospital room?: A Little   Help from Another: Climbing 3-5 steps with a railing?: A Little       AM-PAC Score:  Raw Score: 16   Approx Degree of Impairment: 54.16%   Standardized Score (AM-PAC Scale): 40.78   CMS Modifier (G-Code): CK    FUNCTIONAL ABILITY STATUS  Gait Assessment   Functional Mobility/Gait Assessment  Gait Assistance: Contact guard assist  Distance (ft): 150  Assistive Device: Rolling walker  Pattern:  (mildly unsteady)    Skilled Therapy Provided     Gait Training:   Pt cued on upright standing posture to improve alignment with UEs  Pt cued on gait sequencing with RW  Pt cued on proper UE placement on RW handles  Pt instructed on continued use of RW for improved balance and endurance    Therapeutic Activity:   Pt cued on placement of UE and LEs for optimal force generation and safe STS transfer.    Pt cued on usage of arm rests for controlled descent into sitting  Pt and family educated and trained on log roll technique for pain control and spinal protection - handout provided; improved mechanics and pain with HOB elevated    Bed Mobility:  Rolling: NT  Supine to sit: modA from flat bed; CGA HOB elevated  Sit to supine: supervision from [address] [place] [address],[address] elevated     Transfer Mobility:  Sit to stand: CGA   Stand to sit: CGA  Gait = CGA    Therapist's Comments: RN cleared for session. Pt agreeable for therapy, received supine. Supportive family present for session. Instructed to call for nursing staff for any needs and OOB mobility. Exercise/Education Provided:  Bed mobility  Body mechanics  Energy conservation  Functional activity tolerated  Gait training  Posture  Strengthening  Transfer training    Patient End of Session: In bed;Needs met;Call light within reach;RN aware of session/findings; All patient questions and concerns addressed; Alarm set; Family present; Discussed recommendations with /      Patient Evaluation Complexity Level:  History Moderate - 1 or 2 personal factors and/or co-morbidities   Examination of body systems Low - addressing 1-2 elements   Clinical Presentation Low - Stable   Clinical Decision Making Low - Stable       PT Session Time: 35 minutes  Gait Training: 15 minutes  Therapeutic Activity: 10 minutes

## 2023-07-13 NOTE — PHYSICAL THERAPY NOTE
PT orders received, chart reviewed. Attempted to see for PT eval this am, however per RN patient has been refusing out of bed activity, refusing orthostatics. Will reattempt as able and as patient appropriate. RN aware and in agreement.      Diana Del Angel, PT  07/13/23

## 2023-07-13 NOTE — PROGRESS NOTES
Assumed care at 7:30am  Patient is alert and oriented x4. Upset about his pain medication not being as strong as he requires. Messaged Dr Paola Wheeler, she added norco.  Patient is unwilling to get up for orthostatic because he says he's weak. Will reassess after pain medication. All safety precautions in place. Will continue to monitor patient,.

## 2023-07-13 NOTE — OCCUPATIONAL THERAPY NOTE
OT orders received, chart reviewed. Attempted to see for OT eval this am, however per RN patient has been refusing out of bed activity, refusing orthostatics. Will reattempt as able and as patient appropriate. RN aware and in agreement.

## 2023-07-13 NOTE — CM/SW NOTE
MSW met with pt's family in room, pt in restroom. Family wants to see if pt will qualify for a hospital bed, if not they will pay out of pocket to rent. Rental option placed on AVS.     Referral for Hospital bed sent and pending review, [facility] thinks it will get approved. Order and verbiage sent to [facility] to review. Family also would like [place] [phone]- all set up with [facility].  AVS updated      Next step:  Is hospital Bed approved through [facility]-update family

## 2023-07-13 NOTE — CM/SW NOTE
Department  notified of request for [facility] referrals started. Assigned CM/SW to follow up with pt/family on further discharge planning.      Tanmay Ureña  [facility]

## 2023-07-14 ENCOUNTER — APPOINTMENT (OUTPATIENT)
Dept: CT IMAGING | Facility: HOSPITAL | Age: 83
End: 2023-07-14
Attending: HOSPITALIST
Payer: MEDICARE

## 2023-07-14 VITALS
WEIGHT: 140 LBS | DIASTOLIC BLOOD PRESSURE: 72 MMHG | TEMPERATURE: 98 F | BODY MASS INDEX: 22.5 KG/M2 | HEIGHT: 66 IN | OXYGEN SATURATION: 95 % | SYSTOLIC BLOOD PRESSURE: 139 MMHG | HEART RATE: 75 BPM | RESPIRATION RATE: 17 BRPM

## 2023-07-14 PROCEDURE — 70450 CT HEAD/BRAIN W/O DYE: CPT | Performed by: HOSPITALIST

## 2023-07-14 PROCEDURE — 94799 UNLISTED PULMONARY SVC/PX: CPT

## 2023-07-14 RX ORDER — HYDROCODONE BITARTRATE AND ACETAMINOPHEN 5; 325 MG/1; MG/1
1 TABLET ORAL EVERY 6 HOURS PRN
Qty: 14 TABLET | Refills: 0 | Status: SHIPPED | OUTPATIENT
Start: 2023-07-14

## 2023-07-14 RX ORDER — HYDROCODONE BITARTRATE AND ACETAMINOPHEN 5; 325 MG/1; MG/1
1 TABLET ORAL EVERY 6 HOURS PRN
Status: DISCONTINUED | OUTPATIENT
Start: 2023-07-14 | End: 2023-07-14

## 2023-07-14 RX ORDER — TRAMADOL HYDROCHLORIDE 50 MG/1
50 TABLET ORAL EVERY 6 HOURS PRN
Status: DISCONTINUED | OUTPATIENT
Start: 2023-07-14 | End: 2023-07-14

## 2023-07-14 RX ORDER — SECUKINUMAB 150 MG/ML
300 INJECTION SUBCUTANEOUS
Qty: 2 EACH | Refills: 2 | Status: SHIPPED | COMMUNITY
Start: 2023-07-14

## 2023-07-14 NOTE — DISCHARGE SUMMARY
General Medicine Discharge Summary     Patient ID:  Phylicia Barnes year old  [id number]  2/4/1940    Admit date: 7/12/2023    Discharge date and time:  7/14/23    Attending Physician: Abbey Rodriguez, *     Primary Care Physician: Isabella Emery MD     Reason for admission:  syncope      Risk of readmission: Van Quintero has Moderate Risk of readmission after discharge from the hospital.    Hospital Discharge Diagnoses:  as above,  c5 spinous tip fx    Lace+ Score: 66  59-90 High Risk  29-58 Medium Risk  0-28   Low Risk. TCM Follow-Up Recommendation:  LACE 29-58: Moderate Risk of readmission after discharge from the hospital.          Discharge Condition: alive    Important follow up  -PCP Isabella Emery MD see appointments listed below    -Labs: prn  -Radiology:   prn     79year old male with PMH sig for PSVT s/p ablation, CAD/PAD, COPD, hypertension, hyperlipidemia presented with syncope. Syncope-work up in progress  - orthostats pending- pt declined  - cards on consult, appreciate. Echo ok  - monitor on tele- if unremarkable, plan for outpatient event monitor     C5 spinous process fx  - soft C collar  - cleared by ER  - no further interventions     Head laceration  - f/u with PMD in a few days for stitches removal      CAD/PAD - asa, statin  COPD - home inhalers  Essential HTN - hold losartan pending orthostats  Hyperlipidemia - statin        Consults: IP CONSULT TO HOSPITALIST  IP CONSULT TO GENERAL SURGERY  IP CONSULT TO CARDIOLOGY  IP CONSULT TO SOCIAL WORK  IP CONSULT TO SOCIAL WORK  IP CONSULT TO SOCIAL WORK    Radiology:  CT BRAIN OR HEAD ([id number])    Result Date: 7/14/2023            PROCEDURE:  CT BRAIN OR HEAD ([id number])  COMPARISON:  [facility], CT BRAIN OR HEAD ([id number]), 7/12/2023, 2:05 PM.  INDICATIONS:  Left eye droop  TECHNIQUE:  Noncontrast CT scanning is performed through the brain. Dose reduction techniques were used.  Dose information is transmitted to the ACR (American College of Radiology) NRDR ([address]) which includes the Dose Index Registry. PATIENT STATED HISTORY: (As transcribed by Technologist)  Left eye droop   FINDINGS: No evidence of intracranial hemorrhage or extra-axial fluid collection. Lucencies in the deep periventricular white matter are likely sequelae of chronic small vessel ischemic disease. Prominence of the sulci. No mass effect. Visualized portions of paranasal sinuses are unremarkable. Visualized portions of the mastoid air cells are unremarkable. Visualized portions of the orbits are unremarkable. IMPRESSION: Sequelae of chronic small vessel ischemic disease is noted. No evidence of intracranial hemorrhage or extra-axial fluid collection. LOCATION:  THE [facility]   Dictated by (CST): Eleazar Simms MD on 2023 at 10:28 AM     Finalized by (CST): Eleazar Simms MD on 2023 at 10:31 AM       CARD ECHO 2D DOPPLER (CPT=93306)    Result Date: 2023  Transthoracic Echocardiogram Name:Rafael Banegas Patella Date: 2023 :  1940 Ht:  (66in)  BP: 126 / 70 MRN:  [id number]    Age:  83years    Wt:  (139lb) HR: 76bpm Loc:  [facility]       Gndr: M          BSA: 1.71m^2 Sonographer: Betzaida Becker [facility] Ordering:    Green Noun Consulting:  Green Noun Referring:   Nithin Galo, ---------------------------------------------------------------------------- History/Indications:  Fall, laceration to head, syncope evaluation  Risk factors:  Hypertension. ---------------------------------------------------------------------------- Procedure information:  A transthoracic complete 2D study was performed. Additional evaluation included M-mode, complete spectral Doppler, and color Doppler. Patient status:  Inpatient. Location:  Room [facility]. Comparison was made to the study of 2023. This was a routine study. Transthoracic echocardiography for ventricular function evaluation and assessment of valvular function.  Image quality was adequate. ECG rhythm:   Normal sinus ---------------------------------------------------------------------------- Conclusions: 1. Left ventricle: The cavity size was normal. Wall thickness was normal.    Systolic function was normal. The estimated ejection fraction was 65-70%. Wall motion is normal; there are no regional wall motion abnormalities. Left ventricular diastolic function parameters were normal for the    patient's age. 2. Right ventricle: Systolic function was normal. The RV pressure during    systole is 39mm Hg. 3. Left atrium: The left atrial volume was mildly increased. 4. Aortic valve: The valve was probably trileaflet. The leaflets were mildly    thickened and mildly calcified. There was thickening, consistent with    sclerosis. There was trivial regurgitation. The peak systolic velocity    was 8.32N/ELPIDIO. The mean systolic gradient was 9mm Hg. 5. Aortic root: The aortic root was 4.0cm diameter. Impressions: This study is compared with previous dated 3/7/2023: In comparison, no significant change. * ---------------------------------------------------------------------------- * Findings: Left ventricle: The cavity size was normal. Wall thickness was normal. Systolic function was normal. The estimated ejection fraction was 65-70%. Wall motion is normal; there are no regional wall motion abnormalities. Left ventricular diastolic function parameters were normal for the patient's age. Left atrium:  The left atrial volume was mildly increased. Right ventricle: The cavity size was normal. Systolic function was normal. Right atrium:  The atrium was normal in size. Mitral valve: The annulus was mildly calcified. Doppler:  Transvalvular velocity was within the normal range. There was no evidence for stenosis. There was trivial regurgitation. Aortic valve: The valve was probably trileaflet. The leaflets were mildly thickened and mildly calcified.  There was thickening, consistent with sclerosis. Doppler:  Transvalvular velocity was increased. There was no evidence for stenosis. There was trivial regurgitation. The valve area (VTI) was 1.76cm^2. The valve area (VTI) index was 1.03cm^2/m^2. The mean systolic gradient was 9mm Hg. The peak systolic gradient was 21VJ Hg. Tricuspid valve:   Doppler:  Transvalvular velocity was within the normal range. There was no evidence for stenosis. There was mild regurgitation. Pulmonic valve:   Not well visualized. Doppler:  Transvalvular velocity was within the normal range. There was no evidence for stenosis. There was trivial regurgitation. Pericardium:   There was no pericardial effusion. Aorta: Aortic root: The aortic root was 4.0cm diameter. Ascending aorta: The ascending aorta was normal. Pulmonary arteries: Not well visualized. Systolic pressure was mildly increased, estimated to be 39mm Hg.  Estimated pulmonary artery diastolic pressure was 87DB Hg. ---------------------------------------------------------------------------- Measurements  Left ventricle                  Value          Ref  IVS thickness, ED, PLAX         0.8   cm       0.6 - 1.0  LV ID, ED, PLAX                 4.3   cm       4.2 - 5.8  LV ID, ES, PLAX                 2.6   cm       2.5 - 4.0  LV PW thickness, ED, PLAX       0.9   cm       0.6 - 1.0  IVS/LV PW ratio, ED, PLAX       0.92           ---------  LV PW/LV ID ratio, ED, PLAX     0.2            ---------  LV ejection fraction            70    %        52 - 72  Stroke volume/bsa, 2D           43    ml/m^2   ---------  LV e', lateral              (L) 6.2   cm/sec   >=10.0  LV E/e', lateral                12             <=13  LV e', medial                   8.5   cm/sec   >=7.0  LV E/e', medial                 9              ---------  LV e', average                  7.3   cm/sec   ---------  LV E/e', average                10             <=14  LVOT                            Value          Ref  LVOT ID 2.2   cm       ---------  LVOT peak velocity, S           0.89  m/sec    ---------  LVOT VTI, S                     19.4  cm       ---------  LVOT mean gradient, S           2     mm Hg    ---------  Stroke volume (SV), LVOT DP     74    ml       ---------  Stroke index (SV/bsa), LVOT     43    ml/m^2   ---------  DP  Aortic valve                    Value          Ref  Aortic valve peak velocity,     2.13  m/sec    ---------  S  Aortic valve VTI, S             41.9  cm       ---------  Aortic mean gradient, S         9     mm Hg    ---------  Aortic peak gradient, S         18    mm Hg    ---------  Aortic valve area, VTI          1.76  cm^2     ---------  Aortic valve area/bsa, VTI      1.03  cm^2/m^2 ---------  Aortic root                     Value          Ref  Aortic root ID, ED          (H) 4.0   cm       2.5 - 3.9  Ascending aorta                 Value          Ref  Ascending aorta ID, A-P, ED     3.8   cm       2.2 - 3.8  Left atrium                     Value          Ref  LA ID, A-P, ES              (L) 2.6   cm       3.0 - 4.0  LA volume, S                (H) 67    ml       18 - 58  LA volume/bsa, S            (H) 39    ml/m^2   16 - 34  LA volume, ES, 1-p A4C      (H) 74    ml       18 - 58  LA volume, ES, 1-p A2C      (H) 60    ml       18 - 58  LA volume, ES, A/L              72    ml       ---------  LA volume/bsa, ES, A/L      (H) 42    ml/m^2   16 - 34  LA/aortic root ratio            0.67           ---------  Mitral valve                    Value          Ref  Mitral E-wave peak velocity     0.76  m/sec    ---------  Mitral A-wave peak velocity     1.1   m/sec    ---------  Mitral peak gradient, D         2     mm Hg    ---------  Mitral E/A ratio, peak          0.7            ---------  Pulmonary artery                Value          Ref  PA pressure, S, DP              39    mm Hg    ---------  PA pressure, ED, DP             12    mm Hg    ---------  Tricuspid valve                 Value Ref  Tricuspid regurg peak       (H) 2.98  m/sec    <=2.8  velocity  Tricuspid peak RV-RA            36    mm Hg    ---------  gradient  Systemic veins                  Value          Ref  Estimated CVP                   3     mm Hg    ---------  Right ventricle                 Value          Ref  RV pressure, S, DP              39    mm Hg    ---------  Pulmonic valve                  Value          Ref  Pulmonic regurg velocity,       1.54  m/sec    ---------  ED  Pulmonic regurg gradient,       9     mm Hg    ---------  ED Legend: (L)  and  (H)  charan values outside specified reference range. ---------------------------------------------------------------------------- Prepared and electronically signed by Eugene Lovett 07/13/2023 15:58     XR CHEST AP PORTABLE  (CPT=71045)    Result Date: 7/12/2023  PROCEDURE:  XR CHEST AP PORTABLE  (CPT=71045)  TECHNIQUE:  AP chest radiograph was obtained. COMPARISON:  [facility] , XR, XR CHEST AP PORTABLE  (CPT=71045), 6/20/2021, 3:06 PM.  INDICATIONS:  fall, +LOC, lac to back of head  PATIENT STATED HISTORY: (As transcribed by Technologist)  Patient offered no additional history at this time. FINDINGS:  Lungs and pleural spaces are clear. Cardiac size is within normal limits. Aorta is atherosclerotic. Deformity right posterior 5th and 6th ribs are noted. This could be the result of previous injury or possibly fibrous dysplasia but is stable. CONCLUSION:  1. There is no evidence of active cardiopulmonary disease. 2. Deformity right posterior 5th and 6th ribs is stable and could be the result of previous injury or fibrous dysplasia. LOCATION:        Dictated by (CST): Abbey Ontiveros MD on 7/12/2023 at 2:56 PM     Finalized by (CST): Abbey Ontiveros MD on 7/12/2023 at 2:57 PM       CT SPINE CERVICAL (CPT=72125)    Result Date: 7/12/2023  PROCEDURE:  CT SPINE CERVICAL (CPT=72125)  COMPARISON:  None.   INDICATIONS:  fall, +LOC, lac to back of head  TECHNIQUE: Noncontrast CT scanning of the cervical spine is performed from the skull base through C7. Multiplanar reconstructions are generated. Dose reduction techniques were used. Dose information is transmitted to the [facility]) NRDR ([facility]) which includes the Dose Index Registry. PATIENT STATED HISTORY: (As transcribed by Technologist)  Patient states he fell today. FINDINGS:  Cervical spine demonstrates normal vertebral body height and alignment. Adelina Multilevel degenerative changes are present. The spinal canal is patent. Evaluation of the soft tissues demonstrates a hematoma in the left paraspinal musculature measuring up to 5.3 x 2.9 cm. Lung apices are within normal limits. CONCLUSION:   1. Hematoma in the left paraspinal musculature is noted. 2. Fracture at the tip of the C4 spinous process. LOCATION:  THE [facility]   Dictated by (CST): Kayla Epstein MD on 7/12/2023 at 2:28 PM     Finalized by (CST): Kayla Epstein MD on 7/12/2023 at 2:33 PM       CT BRAIN OR HEAD ([id number])    Result Date: 7/12/2023  PROCEDURE:  CT BRAIN OR HEAD ([id number])  COMPARISON:  None. INDICATIONS:  fall, +LOC, lac to back of head  TECHNIQUE:  Noncontrast CT scanning is performed through the brain. Dose reduction techniques were used. Dose information is transmitted to the [facility]) NRDR ([address]) which includes the Dose Index Registry. PATIENT STATED HISTORY: (As transcribed by Technologist)  Patient states he fell today. FINDINGS: Ventricles and sulci are within normal limits. Hypodensities in the periventricular subcortical white matter is compatible chronic small vessel disease. Volume loss is present. There is no midline shift or mass-effect. The basal cisterns are patent. The gray-white matter differentiation is intact. There is no acute intracranial hemorrhage or extra-axial fluid collection.   There is no focal parenchymal attenuation abnormality. There is no evident fracture. The visualized paranasal sinuses and mastoid air cells are unremarkable. CONCLUSION:  1. No acute intracranial abnormality. LOCATION:  [facility]   Dictated by (CST): Leroy Benitez MD on 7/12/2023 at 2:27 PM     Finalized by (CST): Leroy Benitez MD on 7/12/2023 at 2:28 PM         Operative Procedures:      Disposition: alive    Patient Instructions: Current and discharge medications reviewed with patient  Current Discharge Medication List    START taking these medications    HYDROcodone-acetaminophen 5-325 MG Oral Tab  Take 1 tablet by mouth every 6 (six) hours as needed. CONTINUE these medications which have CHANGED    Secukinumab, 300 MG Dose, (COSENTYX SENSOREADY, 300 MG,) 150 MG/ML Subcutaneous Solution Auto-injector  Inject 300 mg into the skin every 30 (thirty) days. CONTINUE these medications which have NOT CHANGED    Black Pepper-Turmeric (TURMERIC COMPLEX/BLACK PEPPER OR)  Take 1,000 mg by mouth daily. VALSARTAN 80 MG Oral Tab  Take 1 tablet (80 mg total) by mouth daily. mupirocin 2 % External Ointment  Apply 1 Application topically 3 (three) times daily. omega-3 fatty acids 1000 MG Oral Cap  Take 1,000 mg by mouth daily. aspirin 81 MG Oral Tab EC  Take 1 tablet (81 mg total) by mouth every other day. LORAZEPAM 1 MG Oral Tab  TAKE 1 TABLET(1 MG) BY MOUTH TWICE DAILY AS NEEDED FOR ANXIETY    triamcinolone 0.1 % External Cream      rosuvastatin 20 MG Oral Tab  Take 1 tablet (20 mg total) by mouth nightly. fluticasone-umeclidin-vilant (TRELEGY ELLIPTA) 100-62.5-25 MCG/INH Inhalation Aerosol Powder, Breath Activated  Inhale 1 puff into the lungs daily. ZINC OR  Take 50 mg by mouth. Montelukast Sodium (SINGULAIR) 10 MG Oral Tab  Take 1 tablet (10 mg total) by mouth daily. Coenzyme Q10 (COQ10) 100 MG Oral Cap  Take 30 mg by mouth daily.     Albuterol Sulfate HFA (PROAIR HFA) 108 (90 BASE) MCG/ACT Inhalation Aero Soln  Inhale 2 puffs into the lungs every 6 (six) hours as needed for Wheezing.    magnesium 250 MG Oral Tab  Take 100 mg by mouth as needed. VITAMIN D 1000 UNIT OR TABS  Take 4,000 Units by mouth. Fluticasone Furoate (FLONASE SENSIMIST) 27.5 MCG/SPRAY Nasal Suspension  2 sprays by Nasal route daily. Home Medication Changes: Activity: as directed  Diet: as directed  Wound Care: as directed  Code Status: No Order  O2: prn    Follow-up with        What's Next         Schedule an appointment with Bryanna De La Paz MD as soon as possible for a visit in 1 week(s)  Specialty: Internal Medicine, PEDIATRICS  coordinator to call you to schedule appt [address]  [place] [address] [phone]          Follow up with Judith Russo MD  Wednesday Aug 23, 2023  Specialty: CARDIOLOGY  @ 2:40pm for cardiology follow up. Call to set up a heart monitor.  [address]  [address] [place] [address]  [phone]             Total Time Coordinating Care: 35 minutes    Patient had opportunity to ask questions and state understand and agree with therapeutic plan as outlined    Thank You,  Bernadette Craft, [address] [facility]  Internal Medicine  Answering Service number: [phone]

## 2023-07-14 NOTE — PLAN OF CARE
Assumed care of 78 y/o male @1  A/Ox4, RA, NSR-Tele  Regular diet, urinal @bedside  PRN norco given for pain per MAR  Refused orthostatic but losartan resumed for elevated SBP, RAC-SL, IVF dc  PT/OT recommends home w/home health and walker, safety prec maintained and all needs met      Problem: Patient/Family Goals  Goal: Patient/Family Long Term Goal  Description: Patient's Long Term Goal:    Interventions:    - See additional Care Plan goals for specific interventions  Outcome: Progressing

## 2023-07-14 NOTE — PROGRESS NOTES
Family reported possible eye droop to RN. No droop noted on exam. Request by family for repeat head ct. MD notified.

## 2023-07-14 NOTE — PROGRESS NOTES
Assumed care around 0700. A/Ox3-4. R/A. NSR on tele. VSS. Up with 1 assist and walker. Ok for home per PT. C/O neck pain. Per night shift RN. Patient took 1000mg of tylenol he brought from home on his own around change of shift. Norco offered and initially accepted around 1100 and then declined at bedside. Family has concerns for opiate related AMS. Holter monitor to be placed after discharge today in medical office on campus. Staff will continue to monitor.

## 2023-07-14 NOTE — CM/SW NOTE
CM attached signed order for hospital bed to referral in [facility] system and updated [facility] on patient discharge plan for follow up.     Niurka Levi RN Case Manager [phone]

## 2023-08-30 ENCOUNTER — TELEPHONE (OUTPATIENT)
Dept: RHEUMATOLOGY | Facility: CLINIC | Age: 83
End: 2023-08-30

## 2023-08-30 DIAGNOSIS — L40.50 PSORIATIC ARTHRITIS (HCC): ICD-10-CM

## 2023-08-30 RX ORDER — SECUKINUMAB 150 MG/ML
300 INJECTION SUBCUTANEOUS
Qty: 1 EACH | Refills: 2 | Status: SHIPPED | OUTPATIENT
Start: 2023-08-30

## 2023-09-06 ENCOUNTER — TELEPHONE (OUTPATIENT)
Dept: RHEUMATOLOGY | Facility: CLINIC | Age: 83
End: 2023-09-06

## 2023-09-06 ENCOUNTER — OFFICE VISIT (OUTPATIENT)
Dept: RHEUMATOLOGY | Facility: CLINIC | Age: 83
End: 2023-09-06
Payer: MEDICARE

## 2023-09-06 VITALS
BODY MASS INDEX: 22.18 KG/M2 | DIASTOLIC BLOOD PRESSURE: 62 MMHG | TEMPERATURE: 98 F | SYSTOLIC BLOOD PRESSURE: 138 MMHG | HEIGHT: 66 IN | WEIGHT: 138 LBS | OXYGEN SATURATION: 96 % | RESPIRATION RATE: 16 BRPM | HEART RATE: 78 BPM

## 2023-09-06 DIAGNOSIS — E87.5 HYPERKALEMIA: ICD-10-CM

## 2023-09-06 DIAGNOSIS — Z79.899 HIGH RISK MEDICATION USE: ICD-10-CM

## 2023-09-06 DIAGNOSIS — L40.50 PSORIATIC ARTHRITIS (HCC): Primary | ICD-10-CM

## 2023-09-06 DIAGNOSIS — L40.0 PLAQUE PSORIASIS: ICD-10-CM

## 2023-09-06 DIAGNOSIS — I70.0 CALCIFICATION OF ABDOMINAL AORTA (HCC): ICD-10-CM

## 2023-09-06 DIAGNOSIS — M51.36 DDD (DEGENERATIVE DISC DISEASE), LUMBAR: ICD-10-CM

## 2023-09-06 PROCEDURE — 99214 OFFICE O/P EST MOD 30 MIN: CPT | Performed by: INTERNAL MEDICINE

## 2023-09-06 RX ORDER — ROSUVASTATIN CALCIUM 5 MG/1
TABLET, COATED ORAL
COMMUNITY
Start: 2023-07-11

## 2023-09-06 RX ORDER — TIZANIDINE 2 MG/1
TABLET ORAL
COMMUNITY
Start: 2023-08-17

## 2023-09-06 NOTE — TELEPHONE ENCOUNTER
Pt does have a 1:00pm appt with Lonell Elders on 9/6/2023, can ask for more clarification then. Signing encounter.

## 2023-09-11 ENCOUNTER — TELEPHONE (OUTPATIENT)
Dept: RHEUMATOLOGY | Facility: CLINIC | Age: 83
End: 2023-09-11

## 2023-09-11 NOTE — TELEPHONE ENCOUNTER
Rx clarification request: from [facility]    \"Cosentyx: May we dispense 2 pens for 300mg? \"     Called and gave approval.       LOV: 9/6/23  Future Appointments   Date Time Provider Jerri Girard   12/6/2023 11:15 AM Luis Hernandez, DO [facility]   3/6/2024  1:45 PM Luis Hernandez, DO [facility]   Labs: future    3 month f/u. Feeling terrible. Rodney Santiago and was in the hospital for a few days. Doing better now. Hit neck and shoulder along with stitches in head. Skin has some problem areas. Joint pain is ok. Doing 300 mg of cosentyx, and hasn't seen an improvement in skin.

## 2023-11-14 ENCOUNTER — TELEPHONE (OUTPATIENT)
Dept: RHEUMATOLOGY | Facility: CLINIC | Age: 83
End: 2023-11-14

## 2023-11-15 DIAGNOSIS — L40.50 PSORIATIC ARTHRITIS (HCC): ICD-10-CM

## 2023-11-15 NOTE — TELEPHONE ENCOUNTER
Cosentyx sample x2 given. Lot [id number] exp 11/23. Explained to pt to be used by end of the month due to expiration. Pt voices understanding.

## 2023-11-15 NOTE — TELEPHONE ENCOUNTER
Pt dropped off [employer] patient foundation application. Submitted entirety. Faxed to [phone] with confirmation received.

## 2023-11-20 ENCOUNTER — LAB ENCOUNTER (OUTPATIENT)
Dept: LAB | Age: 83
End: 2023-11-20
Attending: INTERNAL MEDICINE
Payer: MEDICARE

## 2023-11-20 DIAGNOSIS — E87.5 HYPERKALEMIA: ICD-10-CM

## 2023-11-20 DIAGNOSIS — Z79.899 HIGH RISK MEDICATION USE: ICD-10-CM

## 2023-11-20 LAB
ANION GAP SERPL CALC-SCNC: 4 MMOL/L (ref 0–18)
BUN BLD-MCNC: 15 MG/DL (ref 9–23)
CALCIUM BLD-MCNC: 9.4 MG/DL (ref 8.5–10.1)
CHLORIDE SERPL-SCNC: 101 MMOL/L (ref 98–112)
CO2 SERPL-SCNC: 30 MMOL/L (ref 21–32)
CREAT BLD-MCNC: 1.27 MG/DL
EGFRCR SERPLBLD CKD-EPI 2021: 56 ML/MIN/1.73M2 (ref 60–?)
FASTING STATUS PATIENT QL REPORTED: NO
GLUCOSE BLD-MCNC: 99 MG/DL (ref 70–99)
OSMOLALITY SERPL CALC.SUM OF ELEC: 281 MOSM/KG (ref 275–295)
POTASSIUM SERPL-SCNC: 4 MMOL/L (ref 3.5–5.1)
SODIUM SERPL-SCNC: 135 MMOL/L (ref 136–145)

## 2023-11-20 PROCEDURE — 80048 BASIC METABOLIC PNL TOTAL CA: CPT

## 2023-11-20 PROCEDURE — 36415 COLL VENOUS BLD VENIPUNCTURE: CPT

## 2023-11-22 RX ORDER — SECUKINUMAB 150 MG/ML
300 INJECTION SUBCUTANEOUS
Qty: 1 EACH | Refills: 2 | Status: SHIPPED | OUTPATIENT
Start: 2023-11-22

## 2023-11-22 NOTE — TELEPHONE ENCOUNTER
Pt approved for [employer] assistance until 12/31/24.      New prescription requested and pended for Dr. Jono Quintero approval.

## 2023-12-06 ENCOUNTER — OFFICE VISIT (OUTPATIENT)
Dept: RHEUMATOLOGY | Facility: CLINIC | Age: 83
End: 2023-12-06
Payer: MEDICARE

## 2023-12-06 VITALS
OXYGEN SATURATION: 100 % | TEMPERATURE: 97 F | RESPIRATION RATE: 16 BRPM | HEART RATE: 68 BPM | HEIGHT: 66 IN | SYSTOLIC BLOOD PRESSURE: 102 MMHG | WEIGHT: 141 LBS | DIASTOLIC BLOOD PRESSURE: 50 MMHG | BODY MASS INDEX: 22.66 KG/M2

## 2023-12-06 DIAGNOSIS — E55.9 VITAMIN D DEFICIENCY: ICD-10-CM

## 2023-12-06 DIAGNOSIS — L40.50 PSORIATIC ARTHRITIS (HCC): Primary | ICD-10-CM

## 2023-12-06 DIAGNOSIS — Z79.899 HIGH RISK MEDICATION USE: ICD-10-CM

## 2023-12-06 DIAGNOSIS — L40.0 PLAQUE PSORIASIS: ICD-10-CM

## 2023-12-06 DIAGNOSIS — M51.36 DDD (DEGENERATIVE DISC DISEASE), LUMBAR: ICD-10-CM

## 2023-12-06 DIAGNOSIS — R29.898 WEAKNESS OF BOTH LOWER EXTREMITIES: ICD-10-CM

## 2023-12-06 PROCEDURE — 99215 OFFICE O/P EST HI 40 MIN: CPT | Performed by: INTERNAL MEDICINE

## 2023-12-06 RX ORDER — ASCORBIC ACID 125 MG
1 TABLET,CHEWABLE ORAL DAILY
COMMUNITY

## 2023-12-06 RX ORDER — CLOBETASOL PROPIONATE 0.5 MG/G
CREAM TOPICAL
Qty: 60 G | Refills: 2 | Status: SHIPPED | OUTPATIENT
Start: 2023-12-06

## 2024-02-26 ENCOUNTER — LAB ENCOUNTER (OUTPATIENT)
Dept: LAB | Age: 84
End: 2024-02-26
Attending: INTERNAL MEDICINE
Payer: MEDICARE

## 2024-02-26 DIAGNOSIS — L40.50 PSORIATIC ARTHRITIS (HCC): ICD-10-CM

## 2024-02-26 DIAGNOSIS — Z79.899 HIGH RISK MEDICATION USE: ICD-10-CM

## 2024-02-26 DIAGNOSIS — E55.9 VITAMIN D DEFICIENCY: ICD-10-CM

## 2024-02-26 DIAGNOSIS — L40.0 PLAQUE PSORIASIS: ICD-10-CM

## 2024-02-26 DIAGNOSIS — R29.898 WEAKNESS OF BOTH LOWER EXTREMITIES: ICD-10-CM

## 2024-02-26 DIAGNOSIS — Z11.1 SCREENING FOR TUBERCULOSIS: ICD-10-CM

## 2024-02-26 LAB
ALBUMIN SERPL-MCNC: 3.7 G/DL (ref 3.4–5)
ALBUMIN/GLOB SERPL: 1.2 {RATIO} (ref 1–2)
ALP LIVER SERPL-CCNC: 52 U/L
ALT SERPL-CCNC: 18 U/L
ANION GAP SERPL CALC-SCNC: 4 MMOL/L (ref 0–18)
AST SERPL-CCNC: 11 U/L (ref 15–37)
BASOPHILS # BLD AUTO: 0.03 X10(3) UL (ref 0–0.2)
BASOPHILS NFR BLD AUTO: 0.5 %
BILIRUB SERPL-MCNC: 1.2 MG/DL (ref 0.1–2)
BUN BLD-MCNC: 17 MG/DL (ref 9–23)
CALCIUM BLD-MCNC: 9 MG/DL (ref 8.5–10.1)
CHLORIDE SERPL-SCNC: 102 MMOL/L (ref 98–112)
CO2 SERPL-SCNC: 29 MMOL/L (ref 21–32)
CREAT BLD-MCNC: 0.92 MG/DL
CRP SERPL-MCNC: <0.29 MG/DL (ref ?–0.3)
EGFRCR SERPLBLD CKD-EPI 2021: 82 ML/MIN/1.73M2 (ref 60–?)
EOSINOPHIL # BLD AUTO: 0.09 X10(3) UL (ref 0–0.7)
EOSINOPHIL NFR BLD AUTO: 1.4 %
ERYTHROCYTE [DISTWIDTH] IN BLOOD BY AUTOMATED COUNT: 14.1 %
ERYTHROCYTE [SEDIMENTATION RATE] IN BLOOD: 18 MM/HR
FASTING STATUS PATIENT QL REPORTED: YES
GLOBULIN PLAS-MCNC: 3.1 G/DL (ref 2.8–4.4)
GLUCOSE BLD-MCNC: 101 MG/DL (ref 70–99)
HCT VFR BLD AUTO: 46 %
HGB BLD-MCNC: 15.5 G/DL
IMM GRANULOCYTES # BLD AUTO: 0.02 X10(3) UL (ref 0–1)
IMM GRANULOCYTES NFR BLD: 0.3 %
LYMPHOCYTES # BLD AUTO: 1.38 X10(3) UL (ref 1–4)
LYMPHOCYTES NFR BLD AUTO: 21.5 %
MAGNESIUM SERPL-MCNC: 2.2 MG/DL (ref 1.6–2.6)
MCH RBC QN AUTO: 31.8 PG (ref 26–34)
MCHC RBC AUTO-ENTMCNC: 33.7 G/DL (ref 31–37)
MCV RBC AUTO: 94.5 FL
MONOCYTES # BLD AUTO: 0.86 X10(3) UL (ref 0.1–1)
MONOCYTES NFR BLD AUTO: 13.4 %
NEUTROPHILS # BLD AUTO: 4.05 X10 (3) UL (ref 1.5–7.7)
NEUTROPHILS # BLD AUTO: 4.05 X10(3) UL (ref 1.5–7.7)
NEUTROPHILS NFR BLD AUTO: 62.9 %
OSMOLALITY SERPL CALC.SUM OF ELEC: 282 MOSM/KG (ref 275–295)
PHOSPHATE SERPL-MCNC: 2.7 MG/DL (ref 2.5–4.9)
PLATELET # BLD AUTO: 210 10(3)UL (ref 150–450)
POTASSIUM SERPL-SCNC: 3.9 MMOL/L (ref 3.5–5.1)
PROT SERPL-MCNC: 6.8 G/DL (ref 6.4–8.2)
RBC # BLD AUTO: 4.87 X10(6)UL
SODIUM SERPL-SCNC: 135 MMOL/L (ref 136–145)
VIT B12 SERPL-MCNC: 503 PG/ML (ref 193–986)
VIT D+METAB SERPL-MCNC: 64.1 NG/ML (ref 30–100)
WBC # BLD AUTO: 6.4 X10(3) UL (ref 4–11)

## 2024-02-26 PROCEDURE — 86140 C-REACTIVE PROTEIN: CPT

## 2024-02-26 PROCEDURE — 84207 ASSAY OF VITAMIN B-6: CPT

## 2024-02-26 PROCEDURE — 83735 ASSAY OF MAGNESIUM: CPT

## 2024-02-26 PROCEDURE — 86480 TB TEST CELL IMMUN MEASURE: CPT

## 2024-02-26 PROCEDURE — 82607 VITAMIN B-12: CPT

## 2024-02-26 PROCEDURE — 85025 COMPLETE CBC W/AUTO DIFF WBC: CPT

## 2024-02-26 PROCEDURE — 84100 ASSAY OF PHOSPHORUS: CPT

## 2024-02-26 PROCEDURE — 85652 RBC SED RATE AUTOMATED: CPT

## 2024-02-26 PROCEDURE — 82306 VITAMIN D 25 HYDROXY: CPT

## 2024-02-26 PROCEDURE — 84425 ASSAY OF VITAMIN B-1: CPT

## 2024-02-26 PROCEDURE — 80053 COMPREHEN METABOLIC PANEL: CPT

## 2024-02-28 LAB
M TB IFN-G CD4+ T-CELLS BLD-ACNC: 0.02 IU/ML
M TB TUBERC IFN-G BLD QL: NEGATIVE
M TB TUBERC IGNF/MITOGEN IGNF CONTROL: >10 IU/ML
QFT TB1 AG MINUS NIL: 0 IU/ML
QFT TB2 AG MINUS NIL: 0 IU/ML

## 2024-02-29 ENCOUNTER — TELEPHONE (OUTPATIENT)
Dept: RHEUMATOLOGY | Facility: CLINIC | Age: 84
End: 2024-02-29

## 2024-03-01 LAB — VITAMIN B6: 16.7 UG/L

## 2024-03-03 LAB — VITAMIN B1 WHOLE BLD: 145.1 NMOL/L

## 2024-03-06 ENCOUNTER — OFFICE VISIT (OUTPATIENT)
Dept: RHEUMATOLOGY | Facility: CLINIC | Age: 84
End: 2024-03-06
Payer: MEDICARE

## 2024-03-06 VITALS
BODY MASS INDEX: 22.88 KG/M2 | SYSTOLIC BLOOD PRESSURE: 116 MMHG | DIASTOLIC BLOOD PRESSURE: 60 MMHG | HEART RATE: 90 BPM | RESPIRATION RATE: 16 BRPM | HEIGHT: 66 IN | TEMPERATURE: 98 F | WEIGHT: 142.38 LBS | OXYGEN SATURATION: 95 %

## 2024-03-06 DIAGNOSIS — M50.30 DDD (DEGENERATIVE DISC DISEASE), CERVICAL: ICD-10-CM

## 2024-03-06 DIAGNOSIS — L40.0 PLAQUE PSORIASIS: ICD-10-CM

## 2024-03-06 DIAGNOSIS — Z79.899 ENCOUNTER FOR MONITORING STATIN THERAPY: ICD-10-CM

## 2024-03-06 DIAGNOSIS — Z79.899 HIGH RISK MEDICATION USE: ICD-10-CM

## 2024-03-06 DIAGNOSIS — I70.0 AORTIC ATHEROSCLEROSIS (HCC): ICD-10-CM

## 2024-03-06 DIAGNOSIS — R29.898 WEAKNESS OF BOTH LOWER EXTREMITIES: ICD-10-CM

## 2024-03-06 DIAGNOSIS — L40.50 PSORIATIC ARTHRITIS (HCC): Primary | ICD-10-CM

## 2024-03-06 DIAGNOSIS — Z51.81 ENCOUNTER FOR MONITORING STATIN THERAPY: ICD-10-CM

## 2024-03-06 DIAGNOSIS — M51.36 DDD (DEGENERATIVE DISC DISEASE), LUMBAR: ICD-10-CM

## 2024-03-06 PROCEDURE — 99214 OFFICE O/P EST MOD 30 MIN: CPT | Performed by: INTERNAL MEDICINE

## 2024-03-06 NOTE — PROGRESS NOTES
RHEUMATOLOGY FOLLOW UP   Date of visit: 03/06/2024  ?  Chief Complaint   Patient presents with    Follow - Up     LOV 12/6/2023. Pt states he has rt sided low back pain 6/10 but its on and off. Pt states he has some areas that crop up with the psoriasis. Rapid 3 score = 3.7.       ASSESSMENT, DISCUSSION & PLAN   Assessment:  1. Psoriatic arthritis (HCC)    2. Plaque psoriasis    3. High risk medication use    4. DDD (degenerative disc disease), lumbar    5. Weakness of both lower extremities    6. Aortic atherosclerosis (HCC)    7. Encounter for monitoring statin therapy    8. DDD (degenerative disc disease), cervical          Discussion:  Mr. Mohamud Banegas is a 83 yo man who initially presented for second opinoin regarding his joint pain and skin. States he has had a long standing history of psoriasis for over 40 years but was fairly mild until recently. He was diagnosed with seronegative RA in 2019 and started on plaquenil and prednisone with some relief of symptoms. Starting this spring/summer, he has had significant worsened skin rashes. He was evaluated by dermatology who dx pt with psoriasis. Tried otezla which seemed to work initially, however had continued skin rash. He presents for second opinion. At his initial visit, he had minimal synovitis of the joints of the hands, no signs of tenosynovitis, positive nail pitting of fingernails diffusely with some onycholysis. He does have left sided SI joint tenderness.     Since that time, he was started on cosentyx and doing well. He has tolerated so far without obvious side effects.   At a prior visit, he felt his skin worsened so we increased dosage to 300mg every 4 weeks instead of the 150mg. He continues to report active skin disease (prior exams showed dry skin but no active psoriasis). He denies joint pain or swelling like previously.   His main complaint revolves around neck/lower back  Recommended he start formal PT since he admits to not doing an HEP  as previously discussed.  He will consider pain management again, although he has been seen before and does not seem interested in injections. Okay to apply heat to the area and consider using TENS unit on the lower back.   Pt has significant DDD of the cervical spine with mild spinal stenosis but moderate to severe neural foraminal stenosis in various areas. Discussed previously that these findings may be contributing to his weakness.     Given his improvement of symptoms overall, he will try to decrease the cosentyx to 150mg (instead of 300mg) every 4 weeks and monitor response. Discussed that if worsened skin disease or joint pain, then will return to the 300mg dosing. Okay to continue topicals prn for the skin  Encouraged him to discuss his questions about supplements with his pcp but since labs normal, encouraged continuing current regimen.     Follow up in 3 months or sooner as needed  Due to borderline low sodium and prior hyperkalemia, will check BMP in about 2-4 weeks. Pt requesting lipid panel since adjusting statin dose.     Patient and pt's wife verbalized understanding of above instructions. No further questions at this time.    Code selection for this visit was based on time spent (35min) on date of service in preparing to see the patient, obtaining and/or reviewing separately obtained history, performing a medically appropriate examination, counseling and educating the patient/family/caregiver, ordering medications or testing, referring and communicating with other healthcare providers, documenting clinical information in the [facility] HR, independently interpreting results and communicating results to the patient/family/caregiver and care coordination with the patient's other providers.    ?  Plan:  Diagnoses and all orders for this visit:    Psoriatic arthritis (HCC)    Plaque psoriasis    High risk medication use    DDD (degenerative disc disease), lumbar  -     Physical Therapy Referral - [facility]  Location    Weakness of both lower extremities  -     Basic Metabolic Panel (8) [E]; Future  -     Lipid Panel [E]; Future    Aortic atherosclerosis (HCC)  -     Basic Metabolic Panel (8) [E]; Future  -     Lipid Panel [E]; Future    Encounter for monitoring statin therapy  -     Basic Metabolic Panel (8) [E]; Future  -     Lipid Panel [E]; Future    DDD (degenerative disc disease), cervical  -     Physical Therapy Referral - [facility] Location            Return in about 3 months (around 6/6/2024).  ?  HPI   Mohamud Banegas is a 84 year old male with the following active problems who was seen initially for second opinion regarding his psoriasis/psoriatic arthritis. He was previously on Otezla and is now on Cosentyx. He presents for follow up today.      Since his last visit, he has been doing okay.  Can get some right sided lower back pain with some difficulty getting out of bed. Taking tizanidine and two tylenol daily with some relief.  Still with some neck pain.  Was not interested in PT- but has not been doing exercises/stretching at home. Was following with a provider for counterstrain but too far to drive.  Still not interested in following with prior pain management.   Using heating pad to the neck/upper back.   Has not used TENS unit recently    Does get some flaring of the skin with spots on the legs and the back. States seems smaller spots that are itching that makes it worse. Feels the topicals are helping.     Denies significant joint pain, states manageable. Can happen in the knees. Denies overt swelling of the joints.   Some morning stiffness primarily in the back, can \"last for awhile\"  Does feel some \"increased symptoms\" leading up to cosentyx dosing. Some psoriasis and some joint pain but not clear.     Denies any further falls/fractures. Continues K2 supplementation.     Suffered covid infection after [place]- worsened balance and difficulty ambulating, resolved within 24 hours. Did not need to  skip cosentyx. Did take paxlovid.     Continues with cosentyx to 300mg q 4 weeks.      HPI from initial consultation  referred for rheumatologic evaluation due to second opinion regarding psoriasis/psoriatic arthritis.     Has suffered from plaque psoriasis for several years (over 40 years). Initially was affecting the hands and fingernail and over time affected other areas diffusely.    Then around 2019, he he suffered a wound on the left leg, states he had swelling in the foot/leg. Had DVT ruled out and eventually was diagnosed with arthritis by his PCP. Was placed on low dose prednisone with no relief, then when placed on 20mg had improvement with higher dose.   Had pain/swelling in the left foot, then the right foot then both hands and wrists. Was so difficult he couldn't even turn a key.   Was evaluated by another rheumatologist and was diagnosed with seronegative rheumatoid arthritis. Had recommended methotrexate but pt did research and did not want to take medication. Was placed on hydroxychloroquine in March of 2020. Has been taking since that time. States seemed like the medication helped with most of the joint pain with exception of the feet being swollen.  Swelling does improve with elevating the legs.   Had difficulty walking up/down the stairs due to the \"arthritis\" pt just states different areas bothering him.  Had significant flare of psoriasis over the summer and was referred to dermatologist. Was given different topicals  Was given samples of Otezla per pt preference. Started on starter pack but was not able to get approved through insurance. After completing sample pack, he was able to get generic from [place] but still having skin flaring while on the generic.  Was seen by dermatology- Dr. Mccann again, and started prednisone (as high as 40mg) and did another course of steroids. Had biopsy of a few lesions and is waiting to find pathology.   Has lesions over belly, chest, back, arms, and legs.  States very itchy.     Denies current joint pain, but taking prednisone currently. States was having some wrist and hand pain when off the steroids.   + fingernail changes from psoriasis  + middle finger PIPs have ramsey swollen  + chronic contracture of the left pinky - did have surgery but had recurrence.     + morning stiffness and difficult walking, lasted throughout the day when severe, but much better when on otezla alone   + altered gum sensation and some pain  + hx of problem with his eye, inflamed per pt. Saw Dr. Swann, required steroid eye drops and topical abx (a few months ago)  + hx of pancreatitis- thought related to pancreatic divisum; and elevated cholesterol   + constipation while on plaquenil resolved since that time  + some left sided low back pain, typically at night and can be difficult to get into a comfortable position  + difficulty falling asleep at night- take ativan; was given mirtazapine but isn't helping. Not necessarily due to pain itself.   + dry mouth throughout the day, improved with prednisone, also drinking a lot of water       Hx of SVT s/p ablation  Some difficulty swallowing typically when laying hardik  Hx of KI   Hx of diverticulosis without hx of diverticulitis       The patient denies oral or nasal ulcers, Raynaud's phenomenon, prior hematologic abnormality, prior renal or liver disease, or history of seizures.  No history of prior blood clot in the legs or lungs, strokes or ischemic phenomenon.  Denies nonhealing ulcers on the fingertips.  The patient denies any crampy abdominal pain, diarrhea, bloody stools, nodular painful shin bruises, Achilles heel pain or symptoms of enthesitis.  There are no symptoms of severe dry eyes.  No fevers, chills, lymphadenopathy, unexpected weight loss,      Past Medical History:  Past Medical History:   Diagnosis Date    Allergic rhinitis     COPD     COPD (chronic obstructive pulmonary disease) ([facility]) 08/12/2010    Empyema 1941    GERD      GERD (gastroesophageal reflux disease)     Heart disease     High cholesterol     OTHER DISEASES 2010    PVD    Pancreas divisum MRI    Pancreatitis x 1 in 5/10    Rotator cuff tear     RIGHT SHOULDER    Seronegative rheumatoid arthritis (HCC) 2021    SLEEP APNEA     AHI 15, REM 25, amirah 88%    Spermatocele      Past Surgical History:  Past Surgical History:   Procedure Laterality Date    COLONOSCOPY  > 10 years ago    NDSC ABLATION & RCNSTJ ATRIA LMTD W/O BYPASS      REPAIR ING HERNIA,5+Y/O,REDUCIBL  1952     Family History:  Family History   Problem Relation Age of Onset    Other (Other) Father         COPD, CKD    Cancer Mother         breast     Social History:  Social History     Socioeconomic History    Marital status:    Occupational History    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   Tobacco Use    Smoking status: Former     Packs/day: 1     Types: Cigarettes     Start date: 1955     Quit date: 2018     Years since quittin.5    Smokeless tobacco: Never   Vaping Use    Vaping Use: Never used   Substance and Sexual Activity    Alcohol use: Not Currently     Alcohol/week: 0.0 standard drinks of alcohol     Comment: very rare    Drug use: No   Other Topics Concern    Occupational Exposure Yes     Comment: steel mill, [employer] steel dust    Exercise Yes     Comment: walks about 1 mile at least 2 -3 times a week     Medications:  Outpatient Medications Marked as Taking for the 3/6/24 encounter (Office Visit) with Leslee Hernandez,    Medication Sig Dispense Refill    Menaquinone-7 (VITAMIN K2) 100 MCG Oral Cap Take 1 capsule by mouth daily.      clobetasol 0.05 % External Cream Apply to affected area BID for a week then wait at least a week to apply again 60 g 2    Secukinumab, 300 MG Dose, (COSENTYX SENSOREADY, 300 MG,) 150 MG/ML Subcutaneous Solution Auto-injector Inject 300 mg into the skin every 30 (thirty) days. 1 each 2    tiZANidine 2 MG Oral Tab       rosuvastatin 5 MG Oral Tab 0.5  tablets (2.5 mg total).      Black Pepper-Turmeric (TURMERIC COMPLEX/BLACK PEPPER OR) Take 1,000 mg by mouth daily.      VALSARTAN 80 MG Oral Tab Take 1 tablet (80 mg total) by mouth daily.      mupirocin 2 % External Ointment Apply 1 Application topically 3 (three) times daily.      Fluticasone Furoate (FLONASE SENSIMIST) 27.5 MCG/SPRAY Nasal Suspension 2 sprays by Nasal route daily.      omega-3 fatty acids 1000 MG Oral Cap Take 1,000 mg by mouth daily.      LORAZEPAM 1 MG Oral Tab TAKE 1 TABLET(1 MG) BY MOUTH TWICE DAILY AS NEEDED FOR ANXIETY 30 tablet 0    triamcinolone 0.1 % External Cream       fluticasone-umeclidin-vilant (TRELEGY ELLIPTA) 100-62.5-25 MCG/INH Inhalation Aerosol Powder, Breath Activated Inhale 1 puff into the lungs daily. 3 each 3    ZINC OR Take 50 mg by mouth.      Montelukast Sodium (SINGULAIR) 10 MG Oral Tab Take 1 tablet (10 mg total) by mouth daily. 90 tablet 3    Coenzyme Q10 (COQ10) 100 MG Oral Cap Take 30 mg by mouth daily.      magnesium 250 MG Oral Tab Take 100 mg by mouth as needed.        VITAMIN D 1000 UNIT OR TABS Take 4,000 Units by mouth.         Modified Medications    No medications on file     Medications Discontinued During This Encounter   Medication Reason    doxycycline 100 MG Oral Cap     predniSONE 20 MG Oral Tab      ?  ?  Allergies:  Allergies   Allergen Reactions    Aspirin NAUSEA ONLY     HIGHER DOSAGES. LOW DOSE ASPIRIN OK    Bactrim [Sulfamethoxazole W/Trimethoprim] RASH    Motrin Ib NAUSEA ONLY    Other UNKNOWN    Sulfamethoxazole UNKNOWN    Amlodipine RASH    Ibuprofen NAUSEA ONLY     ?  REVIEW OF SYSTEMS   ?  Review of Systems   Constitutional:  Positive for malaise/fatigue. Negative for chills, fever and weight loss.   HENT:  Positive for congestion and hearing loss. Negative for nosebleeds and sinus pain.    Eyes:  Negative for blurred vision, double vision, pain and redness.   Respiratory:  Positive for cough. Negative for hemoptysis and shortness of  breath (hx COPD).    Cardiovascular:  Negative for chest pain, palpitations and leg swelling.   Gastrointestinal:  Positive for constipation and heartburn (intermittent). Negative for abdominal pain, blood in stool, diarrhea, nausea and vomiting.   Genitourinary:  Negative for dysuria, hematuria and urgency.   Musculoskeletal:  Positive for back pain and joint pain. Negative for myalgias.   Skin:  Negative for itching and rash.   Neurological:  Positive for weakness. Negative for dizziness, tingling, seizures and headaches.   Endo/Heme/Allergies:  Positive for environmental allergies.     PHYSICAL EXAM   Today's Vitals:  Temperature Blood Pressure Heart Rate Resp Rate SpO2   Temp: 97.9 °F (36.6 °C) BP: 116/60 Pulse: 90 Resp: 16 SpO2: 95 %   ?  Current Weight Height BMI BSA Pain   Wt Readings from Last 1 Encounters:   03/06/24 142 lb 6.4 oz (64.6 kg)    Height: 5' 6\" (167.6 cm) Body mass index is 22.98 kg/m². Body surface area is 1.73 meters squared.         Physical Exam  Vitals and nursing note reviewed.   Constitutional:       General: He is not in acute distress.     Appearance: Normal appearance. He is well-developed. He is not diaphoretic.   HENT:      Head:      Comments: Slight scar from recent laceration      Ears:      Comments: + hearing aid  Eyes:      General: No scleral icterus.     Extraocular Movements: Extraocular movements intact.      Conjunctiva/sclera: Conjunctivae normal.   Neck:      Vascular: No JVD.      Trachea: No tracheal deviation.   Cardiovascular:      Rate and Rhythm: Normal rate and regular rhythm.   Pulmonary:      Effort: Pulmonary effort is normal. No respiratory distress.      Breath sounds: Normal breath sounds. No wheezing.   Musculoskeletal:         General: Deformity present. No swelling or tenderness.      Cervical back: Neck supple.      Comments: B/l cmc enlargement wo synovitis  Left pinky finger post-raumatic  No swelling, tenderness, redness or restriction of motion of  the DIPs, pips, MCPs, wrists, elbows, ankles, or joints of the feet.  Chronic contracture of left pinky   Bilateral knees without medial joint line tenderness, mild crepitus, no effusion.  Pes planus  Left lateral mid foot nodule without surrounding synovitis  Neck flexed and difficulty straightening; thoracic kyphosis    Lymphadenopathy:      Cervical: No cervical adenopathy.   Skin:     General: Skin is warm and dry.      Findings: No erythema or rash.      Comments: Significant psoriaform lesions noted over chest, abdomen, back, upper arms -- resolved  + diffusely nail pitting; scattered onycholysis -- resolved    Neurological:      Mental Status: He is alert and oriented to person, place, and time.      Cranial Nerves: No cranial nerve deficit.      Gait: Gait abnormal.   Psychiatric:         Mood and Affect: Mood normal.         Behavior: Behavior normal.       ?  Radiology review:       DATE OF SERVICE: 10.01.2023  MRI CERVICAL SPINE    CLINICAL INDICATION: Cervical radiculopathy  upper back pain. Fall July 12, 2023 with C4  spinous process fracture    TECHNIQUE: Multiplanar and multisequence MR imaging was performed through the cervical spine  using the standard MR protocol.    COMPARISON:   Report CT cervical spine 7/12/2023 from [facility]    CONTRAST: None    FINDINGS:    Alignment: Straightening and reversal normal cervical lordosis . Grade 1 C3-C4 and C4-C5  anterolisthesis and C5-C6 retrolisthesis.  Vertebrae: Vertebral body heights are maintained. The known C4 spinous process fracture is less  apparent on MRI than on the previous reported CT. Multilevel Modic 1/2 endplate degenerative changes  otherwise unremarkable marrow signal.  Cervical Cord:   Cerebellar tonsils are appropriately positioned.  Cervical medullary junction is unremarkable.  Cervical cord has maintained caliber and signal.    Discs: Diffuse disc desiccation. There is mild C3-C4 and C4-C5, severe C5-C6 and C6-C7 disc  space  narrowing. Multilevel osteophytosis.  Disc levels:  C1-C2: Unremarkable.  Central canal: No stenosis.  C2-C3: Severe left and moderate right facet arthropathy.  Central canal: No stenosis.  Neural foramen:  Mild left and no right foraminal stenosis.  C3-C4: Grade 1 anterolisthesis. Disc osteophyte complex with moderate left and mild to  moderate right facet arthropathy.  Central canal: Mild stenosis  Neural foramen:  Moderate right and severe left foraminal stenosis.  C4-C5: Grade 1 anterolisthesis. Disc osteophyte complex with severe right and mild left facet  arthropathy.  Central canal: Mild stenosis  Neural foramen:  Moderate right greater than left foraminal stenosis.  C5-C6: Disc osteophyte complex with moderate right and mild left facet arthropathy. There is  diastases of the left facet joint which is measuring 1.2 cm (series 602, image 5). There is no  evidence of jumped or perched facet. No evidence of fracture by MR.  Central canal: Mild stenosis  Neural foramen:  Moderate right greater than left stenosis.  C6-C7: Disc osteophyte complex eccentric towards the left with mild facet arthropathy  Central canal: No stenosis  Neural foramen:  Severe left and moderate right foraminal stenosis.  C7-T1: Unremarkable  Central canal: No stenosis.  Neural foramen:  No bilateral neural foraminal stenosis.  T1-T2: Disc osteophyte complex. T1 left posterior lateral bone island. No central canal stenosis.  Mild left and moderate right foraminal stenosis.    Paraspinal Soft Tissues: Interspinous ligamentous edema at the C5-C6 level indicating underlying  ligamentous injury.  Other findings: None..    =====  IMPRESSION:  1.  C5-C6 left facet joint diastases with interspinous ligamentous injury at C5-C6. Given the  degree of diastases, stability of the facet joint is indeterminate and given the possibility of  instability, reevaluation with CT cervical spine is recommended to assess for underlying new osseous  injury  which may be MR occult.  2.  The above findings were communicated by telephone to Abby Freedman NP by Dr. Leslee Hammond on  10/2/2023 3:30 PM.  3.  Multilevel cervical spondylosis with spinal stenosis. There is advanced spinal stenosis  involving the foraminal levels of C6-C7 and to a lesser degree C3-C4 through C5-C6. There is mild  multilevel central canal stenosis involving C3-C4 through C5-C6.  Exam End: 10/01/23  3:20 PM    Specimen Collected: 10/02/23  1:49 PM Last Resulted: 10/02/23  3:35 PM   Received From: [facility]  Result Received: 11/20/23  2:30 PM     Narrative                 Impression   PROCEDURE:  CT BRAIN OR HEAD (07330)     COMPARISON:  [facility], CT, CT BRAIN OR HEAD (72223), 7/12/2023, 2:05 PM.     INDICATIONS:  Left eye droop     TECHNIQUE:  Noncontrast CT scanning is performed through the brain. Dose reduction techniques were used. Dose information is transmitted to the ACR (American College of Radiology) NRDR (National Radiology Data Registry) which includes the Dose Index  Registry.     PATIENT STATED HISTORY: (As transcribed by Technologist)  Left eye droop      FINDINGS:  No evidence of intracranial hemorrhage or extra-axial fluid collection.  Lucencies in the deep periventricular white matter are likely sequelae of chronic small vessel ischemic disease.  Prominence of the sulci.  No mass effect.  Visualized portions of paranasal sinuses are unremarkable.  Visualized portions of the mastoid air cells are unremarkable.  Visualized portions of the orbits are unremarkable.  IMPRESSION:  Sequelae of chronic small vessel ischemic disease is noted. No evidence of intracranial hemorrhage or extra-axial fluid collection.           LOCATION:  [facility]        Dictated by (CST): Jakub Howard MD on 7/14/2023 at 10:28 AM      Finalized by (CST): Jakub Howard MD on 7/14/2023 at 10:31 AM      Narrative   PROCEDURE:  CT SPINE CERVICAL (CPT=72125)     COMPARISON:  None.     INDICATIONS:   fall, +LOC, lac to back of head     TECHNIQUE:  Noncontrast CT scanning of the cervical spine is performed from the skull base through C7.  Multiplanar reconstructions are generated.  Dose reduction techniques were used. Dose information is transmitted to the ACR (American College of  Radiology) NRDR (National Radiology Data Registry) which includes the Dose Index Registry.     PATIENT STATED HISTORY: (As transcribed by Technologist)  Patient states he fell today.         FINDINGS:  Cervical spine demonstrates normal vertebral body height and alignment.  Adelina Multilevel degenerative changes are present.  The spinal canal is patent.  Evaluation of the soft tissues demonstrates a hematoma in the left paraspinal musculature  measuring up to 5.3 x 2.9 cm. Lung apices are within normal limits.                   Impression   CONCLUSION:       1. Hematoma in the left paraspinal musculature is noted.       2. Fracture at the tip of the C4 spinous process.        LOCATION:  [facility]        Dictated by (CST): Yariel Hammond MD on 7/12/2023 at 2:28 PM      Finalized by (CST): Yariel Hammond MD on 7/12/2023 at 2:33 PM         Exam: CTA ABDOMEN  and  PELVIS WW/O CONTRAST   CPT Code(s): 04532,-572 - CT ANGIO ABD and PELV W/O and W/DYE,Omnipaque 350 100 mL vial     INDICATION:  Crescentic focus of increased T1 signal intensity within the wall of the aorta on a lumbar spine MRI study. It was uncertain if this represented calcification or intramural hemorrhage. A CTA study was recommended for further evaluation.     COMPARISON:  5/12/2022 lumbar spine MRI study.     TECHNIQUE: Routine CTA study of the abdomen and pelvis performed with 100 cc of intravenous Omnipaque 350.  Sagittal and coronal reconstructed images. This study was performed on a Siemens Somatom Go CT scanner with both CARE Dose 4D tube modulation and   SAFIRE iterative reconstruction for radiation dose reduction.     FINDINGS:   LUNG BASES: Nonspecific linear  atelectasis versus scarring is identified at the lung bases bilaterally. The visualized lung bases are otherwise clear with no focal airspace consolidation. There is no evidence of pleural fluid. Heart size is within normal    limits.     ABDOMEN:  The liver, spleen, pancreas, adrenal glands, and adrenal glands, and kidneys are normal. The gallbladder is unremarkable with no evidence of gallstones or gallbladder wall thickening.     The abdominal aorta is normal in caliber with extensive atherosclerotic plaque and atherosclerotic vascular calcifications identified corresponding to the crescentic areas of T1 hyperintense signal seen on the MRI study. There is no evidence of an   intramural hematoma or aortic dissection. The celiac and superior mesenteric artery origins are widely patent bilaterally. There is a single widely patent left renal artery with 3 smaller caliber, but widely patent right renal arteries. There is focal   atherosclerotic ulceration of the inferior mesenteric artery with no significant stenosis. 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 There is mild tortuosity and dilatation of the proximal right common iliac artery measuring up to 1.6 cm. There is a weblike atherosclerotic plaque within the proximal right common iliac artery with no   significant right common iliac artery stenosis. Atherosclerotic calcifications are identified elsewhere within the external and internal iliac arteries with no other significant stenosis.     PELVIS: There is mild to moderate nonspecific heterogeneous enlargement of the prostate gland. The bladder is unremarkable with no significant bladder wall thickening. Normal caliber pelvic bowel loops with no evidence of bowel wall thickening. Normal   caliber bowel with no evidence of bowel wall thickening. Sigmoid  colon diverticulosis is present with no evidence of diverticulitis. The appendix is normal. There is no evidence of free fluid or lymphadenopathy throughout the abdomen or pelvis. There is   a redemonstrated grade 1 anterolisthesis of L4 on L5 with bilateral L5 pars defects. Severe diffuse moderate to severe diffuse lumbar spondylosis and degenerative facet disease are again identified. The bone structures otherwise unremarkable with no   lytic or sclerotic bone lesions.     IMPRESSION:   1. 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 This finding was discussed by phone with Dr. Hernandez. Conventional angiography is recommended for further evaluation.   2. Extensive atherosclerotic plaque and extensive atherosclerotic calcifications throughout the abdominal aorta and iliac vessels. No evidence of aortic dissection or intramural hematoma. There is mild tortuosity and dilatation of the right common iliac   artery with a weblike plaque. No significant right common iliac artery narrowing is appreciated.   3. Nonspecific linear atelectasis versus scarring at the lung bases bilaterally.   4. Incidental findings include mild to moderate nonspecific heterogeneous enlargement of the prostate glands, sigmoid colon diverticulosis, a redemonstrated grade 1 anterolisthesis of L4 on L5 with bilateral L5 pars defects, and moderate to severe   diffuse lumbar spondylosis which is better evaluated on on the lumbar spine MRI study.     Interpreting Radiologist:     Dave Pereira M.D.   Electronically Signed: 05/13/2022 04:08 PM    Exam: MRI LUMBAR SP W/O CONTRAST   CPT Code(s): 69319 - MRI LUMBAR SPINE W/O DYE     MRI LUMBAR SPINE WITHOUT CONTRAST     HISTORY: Low back pain, unspecified     COMPARISON: None currently available.     TECHNIQUE: Routine MRI exam performed on  a wide bore ultra high field 3.0 T Siemens Skyra MR scanner, without intravenous contrast.     FINDINGS: Convex left curvature of the lumbar spine measuring 16 degrees. Vertebral body heights are maintained. Grade 1 retrolisthesis at the L4-5 level. Grade 1 anterolisthesis at the L5-S1 level. Multilevel discogenic reactive marrow changes. The tip   of the conus medullaris is at the L1 level. Visualized spinal cord and cauda equina have a normal appearance. Paraspinous musculature appears demonstrate mild to moderate fatty atrophy. Multilevel atherosclerosis of the abdominal aorta. In the distal   aorta there crescentic areas of increased signal on T1-weighted sequences which may represent calcification however the possibility of an intramural hemorrhage cannot be excluded.     T11-12: Disc degeneration characterized by disc desiccation and a broad posterior disc bulge. Bilateral facet arthropathy. No central canal stenosis. Moderate to severe right and mild left neural foraminal narrowing.     T12-L1: Disc degeneration characterized by disc desiccation, 30-60% loss of disc height and a shallow posterior disc bulge with a superimposed right subarticular protrusion measuring 3 to 4 mm AP dimension. No significant central canal stenosis. Mild   right and no left neural foraminal narrowing. Mild bilateral facet arthropathy.     L1-2: Disc degeneration characterized by disc desiccation and a shallow posterior disc bulge. Disc height is maintained. Mild facet arthropathy and ligamentum flavum hypertrophy. No significant central canal stenosis or neural foraminal narrowing.     L2-3: Disc degeneration characterized by disc desiccation, 30-60% loss of disc height and a broad posterior disc bulge. Mild to moderate ligamentum flavum hypertrophy and mild facet arthropathy. No central canal stenosis. Mild bilateral neural foraminal   narrowing.     L3-4: Disc degeneration characterized by disc desiccation, 30-60% loss of disc  height and a broad posterior disc bulge. Mild to moderate ligamentum flavum hypertrophy and mild facet arthropathy. No central canal stenosis. Mild bilateral neural foraminal   narrowing.     L4-5: Grade 1 retrolisthesis. Disc degeneration characterized by disc desiccation, greater than 60% loss of disc height and a broad posterior disc bulge. Dorsal annular fissure. Mild facet arthropathy and ligamentum flavum hypertrophy. Narrowing of the   left subarticular recess with encroachment on the left L5 nerve root. Moderate left neural foraminal narrowing. Mild right neural foraminal narrowing.     L5-S1: Grade 1 anterolisthesis. Disc degeneration characterized by disc desiccation and a broad posterior disc bulge. Mild facet arthropathy and ligamentum flavum hypertrophy. No central canal stenosis. Moderate to severe right and severe left neural   foraminal narrowing.     IMPRESSION:   1. L5-S1: Grade 1 anterolisthesis combined with multifactorial degenerative changes results in moderate to severe right and severe left neural foraminal narrowing. No central canal stenosis.   2. L4-5: Grade 1 retrolisthesis combined with multifactorial degenerative changes results in marked narrowing of the left subarticular recess with encroachment on the left L5 nerve root, moderate left neural foraminal narrowing and mild right neural   foraminal narrowing.   3. L3-4: Mild bilateral neural foraminal narrowing. No central canal stenosis.   4. L2-3: Mild bilateral neural foraminal narrowing. No central canal stenosis.   5. Diffuse aortic atherosclerosis. Crescentic area of increased signal on T1 sequences in the distal aorta may represent calcification, however, an intramural hemorrhage cannot be excluded based on this exam. A CTA of the abdominal aorta is recommended   for further evaluation. This finding and recommendations were discussed with Dr. Hernandez at the time of this interpretation with voiced understanding.     Interpreting  Radiologist:     Armani Grimes M.D.   Electronically Signed: 05/13/2022 09:06 AM    DATE OF SERVICE: 12.23.2021   SACROILIACS, 1 FRONTAL AND 2 OBLIQUE VIEWS     CLINICAL INFORMATION:  Psoriatic arthritis (HCC).     COMPARISON STUDY: None.     FINDINGS:       There are no definite acute fractures or dislocations.     No radiographic signs of ankylosis/fusion or significant degenerative disease.     No bony lesions are identified.     Diffuse osteopenia is present.     Impression   IMPRESSION:     Unremarkable radiographic examination of the sacroiliac joints.      DATE OF SERVICE: 12.23.2021   LUMBAR SPINE AP, LATERAL, FLEXION, AND EXTENSION VIEWS     CLINICAL INDICATION: Chronic left-sided low back pain     COMPARISON: None     FINDINGS: Routine views of the lumbar spine show 5 lumbar-type vertebral bodies.     There is levoscoliosis of the thoracolumbar spine. There is grade 1 spondylolisthesis of L5 on S1.   Otherwise, overall alignment of the lumbar spine is within normal limits.. The vertebral body   heights appear well-maintained. The visualized pedicles appear grossly intact.     No definite fracture or subluxation is seen. Flexion and extension views demonstrate no significant   abnormal movements.     Decreased disc height, endplate osteophytes, and/or endplate sclerotic changes consistent with   degenerative spondylosis. Posterior facet arthropathy notable at the mid to lower lumbar spine and   particularly at the lumbosacral junction.     If there is continued clinical concern, cross-sectional imaging may be considered depending on the   remainder of the patient's clinical situation.       Extensive arteriovascular calcifications of the abdominal aorta noted.     Impression   IMPRESSION:   Moderate to severe lumbar spondylosis including degenerative disc disease and posterior facet   arthropathy as detailed above.        PROCEDURE:  MRI BRAIN MRA HEAD+MRA NECK (ALL W+WO) (CPT=70553/73419/44605)        COMPARISON:  None.       INDICATIONS:  eval CVA       TECHNIQUE:  MRI of the brain was performed with multi-planar T1, T2-weighted images with FLAIR sequences and diffusion weighted images without and with infusion.  MR angiography of the brain without and with infusion and MR angiography of the neck   without and with infusion was performed using 3D time of flight, multi-planar and 3D reconstructed images. All measurements obtained in this exam were performed using NASCET criteria.       PATIENT STATED HISTORY:(As transcribed by Technologist)  Patient with unsteady gait.        CONTRAST USED:  14 mL of Dotarem       FINDINGS:        MRI BRAIN   The ventricles and sulci are normal in size for the patient's age with mild atrophy present not uncommon for the age of 81.  No mass-effect, midline shift, hydrocephalus, herniation, extra-axial fluid collections, or signs of acute territorial infarct,   or brain tumor.       No abnormality of midline structures. No sign of restricted diffusion. No pathologic gradient susceptibility pattern.       Flow voids are present within the internal carotid and basilar arteries. No sign of acute fluid in the paranasal sinuses.       With contrast infusion, there is no abnormal enhancement pattern identified.       MRA HEAD AND NECK       Patency of the carotid and vertebral arterial structures of the neck, with no signs of occlusion, high-grade stenosis, acute thrombosis, acute dissection involving the common carotid, internal carotid, external carotid, vertebral arteries bilaterally.     Both vertebral arteries join forming basilar artery.  The basilar artery is patent.       Intracranial circulation demonstrates patency of the intracranial ICA bilaterally, the right and left anterior, middle, and right posterior cerebral arteries without evidence for high-grade stenosis, occlusion, or other acute abnormality.  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  No signs of occlusion.  No restricted diffusion or other features for acute infarct.         Dictated by (CST): Gio Trammell MD on 6/20/2021 at 4:36 PM       Finalized by (CST): Gio Trammell MD on 6/20/2021 at 4:45 PM       PROCEDURE:  XR CHEST AP PORTABLE  (CPT=71045)       TECHNIQUE:  AP chest radiograph was obtained.       COMPARISON:  [facility] , XR, CHEST AP PORT, 3/26/2011, 10:00 AM.       INDICATIONS:  dizziness and feeling unsteady on feet with nausea starting at 4am, vomited a couple hours ago       PATIENT STATED HISTORY: (As transcribed by Technologist)  Patient stated having symptoms of dizziness.            FINDINGS:  The heart is borderline in size.  The lungs are clear of acute-appearing disease process.  The costophrenic angles are sharp.  There is no active disease seen on the basis of portable radiography.                        Impression   CONCLUSION:  Borderline heart size. No active disease seen.           Dictated by (CST): Gio Trammell MD on 6/20/2021 at 3:46 PM       Finalized by (CST): Gio Trammell MD on 6/20/2021 at 3:47 PM      DATE OF SERVICE: 04.22.2021     LEFT FOOT  AP, LATERAL AND OBLIQUE (3 VIEWS)   CLINICAL INFORMATION:  Left foot pain.   COMPARISON STUDY: Left foot, 12/6/2020.   FINDINGS:   No acute fracture or dislocation is seen.   Diffuse osteopenia is present.   The joint spaces are essentially maintained.   The lateral view shows a small inferior calcaneal exostosis (plantar calcaneal spur)    DATE OF SERVICE: 01.28.2021     XR HAND (MIN 3 VIEWS), LEFT (CPT=73130), XR HAND (MIN 3 VIEWS), RIGHT (CPT=73130), XR WRIST COMPLETE   (MIN 3 VIEWS), LEFT (CPT=73110), XR WRIST COMPLETE (MIN 3 VIEWS), RIGHT (CPT=73110)   CLINICAL INDICATION: Bilateral hand swelling. Elevated sedimentation rate and CRP.  History of   psoriasis.   COMPARISON STUDIES: None available.   FINDINGS:   Right hand: There is no evidence of acute fracture or dislocation. The MCP joints and   interphalangeal joints of the hand are preserved. No degenerative osteophytes or discrete osseous   erosions are seen at these joints.   Left hand: There is no evidence of acute fracture or dislocation. The MCP joints and interphalangeal   joints of the hand are preserved. No degenerative osteophytes or discrete osseous erosions are seen   at these joints.   Right wrist: There is no evidence of acute fracture or dislocation. There is mild narrowing of the   thumb CMC joint, with mild marginal osteophyte formation. All other joint spaces of the wrist are   preserved. No discrete osseous erosions are identified.   Left wrist: There is no evidence of acute fracture or dislocation. There is mild narrowing of the   thumb CMC joint, with mild marginal osteophyte formation. All other joint spaces of the wrist are   preserved. No discrete osseous erosions are identified.   Impression   IMPRESSION:   1. 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 Otherwise, no significant arthritic   changes are seen in the bilateral hands and wrists.     DATE OF SERVICE: 01.01.2021     CT CHEST   CLINICAL INDICATION: Follow-up abnormal chest CT   COMPARISON: Chest CT 5/1/2020   TECHNIQUE: 1.25mm thick axial images were obtained through the chest. Coronal reconstructions were   included.   Automated exposure control and ALARA manual techniques for patient specific dose reduction were   followed while maintaining the necessary diagnostic image quality.   FINDINGS:     Lungs and large airways: A nodular density previously seen in the superior segment of the right   lower lobe is no longer visible. Multiple additional scattered small 1 to 2 mm nodular densities are   seen. A groundglass attenuation density measuring approximately 5 mm on image 55 in the right upper   lobe  is stable. A 3 mm nodule in the right upper lobe on image 69 looks new, and may be a mucous   filled bronchus. A subpleural 3 mm nodular density in the left lower lobe on image 168 is stable.   There is platelike atelectasis or scarring in the lingula which is slightly more pronounced than   previously. Diffuse underlying emphysematous changes are present. There are platelike areas of   scarring or atelectasis in both lung bases. Biapical pleural parenchymal scarring is present.   Pleura:  Normal. No pleural effusion or thickening.   Heart and pericardium:  There are calcifications in the plane of the aortic valve, suggestive of   calcific aortic stenosis..   Mediastinum and dayan:  Normal.   Chest wall and lower neck:  Normal.   Vessels:  Atherosclerotic changes in the aorta and coronary arteries.   Bones:  Multilevel degenerative changes are present in the spine. Postoperative changes are noted in   the left hemithorax.   Upper abdomen: There is a small hiatal hernia.   Impression   IMPRESSION:   1. Emphysema.   2. Multiple small nodular densities in the lungs measuring up to 3 mm as described above.   3. Atherosclerosis and possible calcific aortic stenosis     Labs:  Lab Results   Component Value Date    WBC 6.4 02/26/2024    RBC 4.87 02/26/2024    HGB 15.5 02/26/2024    HCT 46.0 02/26/2024    .0 02/26/2024    MPV 9.9 03/26/2011    MCV 94.5 02/26/2024    MCH 31.8 02/26/2024    MCHC 33.7 02/26/2024    RDW 14.1 02/26/2024    NEPRELIM 4.05 02/26/2024    NEPERCENT 62.9 02/26/2024    LYPERCENT 21.5 02/26/2024    MOPERCENT 13.4 02/26/2024    EOPERCENT 1.4 02/26/2024    BAPERCENT 0.5 02/26/2024    NE 4.05 02/26/2024    LYMABS 1.38 02/26/2024    MOABSO 0.86 02/26/2024    EOABSO 0.09 02/26/2024    BAABSO 0.03 02/26/2024     Lab Results   Component Value Date     (H) 02/26/2024    BUN 17 02/26/2024    BUNCREA 17.1 12/02/2022    CREATSERUM 0.92 02/26/2024    ANIONGAP 4 02/26/2024    GFR >59 04/23/2010     GFRNAA 64 01/13/2022    GFRAA 74 01/13/2022    CA 9.0 02/26/2024    OSMOCALC 282 02/26/2024    ALKPHO 52 02/26/2024    AST 11 (L) 02/26/2024    ALT 18 02/26/2024    BILT 1.2 02/26/2024    TP 6.8 02/26/2024    ALB 3.7 02/26/2024    GLOBULIN 3.1 02/26/2024    AGRATIO 2.3 12/08/2014     (L) 02/26/2024    K 3.9 02/26/2024     02/26/2024    CO2 29.0 02/26/2024       Additional Labs:  02/2024  ESR 18 normal  CRP normal  Vitamin D 64.1 normal  CMP grossly normal  CBC grossly normal  B12 503 normal  B6 normal  B1 normal  Phosphorus 2.7 normal  Mag 2.2 normal  QuantiFERON-TB negative    12/2022   Mag 2.1 normal  B12 677 normal   Vit D 65.6 normal   Tsh normal    ESR 5 normal  CRP normal   Uric acid 5.0     01/2022  SOO 1: 160 AMA  SOO 1: 160 speckled  dsDNA, Smith, histone negative  Remainder of [facility] panel negative    05/2021  ESR 11  CRP normal     03/2021  ESR 9  CRP normal     01/2021  SOO 1:80  ESR 49 elevated  CRP 2.23 elevated  TB negative     12/2020  Uric acid 4.5  CCP neg  RF neg  SSA neg  SSB neg  ESR 36   CRP 1.94 elevated    Leslee David, DO  [facility]  03/06/2024

## 2024-06-06 DIAGNOSIS — J69.0 ASPIRATION PNEUMONIA  (CMD): Primary | ICD-10-CM

## 2024-06-11 ENCOUNTER — LAB ENCOUNTER (OUTPATIENT)
Dept: LAB | Facility: HOSPITAL | Age: 84
End: 2024-06-11
Attending: INTERNAL MEDICINE
Payer: MEDICARE

## 2024-06-11 ENCOUNTER — OFFICE VISIT (OUTPATIENT)
Dept: RHEUMATOLOGY | Facility: CLINIC | Age: 84
End: 2024-06-11
Payer: MEDICARE

## 2024-06-11 VITALS
WEIGHT: 133 LBS | DIASTOLIC BLOOD PRESSURE: 52 MMHG | HEART RATE: 88 BPM | HEIGHT: 66 IN | RESPIRATION RATE: 16 BRPM | BODY MASS INDEX: 21.38 KG/M2 | TEMPERATURE: 97 F | SYSTOLIC BLOOD PRESSURE: 132 MMHG | OXYGEN SATURATION: 98 %

## 2024-06-11 DIAGNOSIS — D84.821 IMMUNOCOMPROMISED STATE DUE TO DRUG THERAPY (HCC): ICD-10-CM

## 2024-06-11 DIAGNOSIS — Z79.899 IMMUNOCOMPROMISED STATE DUE TO DRUG THERAPY (HCC): ICD-10-CM

## 2024-06-11 DIAGNOSIS — R05.8 PRODUCTIVE COUGH: ICD-10-CM

## 2024-06-11 DIAGNOSIS — R06.02 SHORTNESS OF BREATH: ICD-10-CM

## 2024-06-11 DIAGNOSIS — L40.0 PLAQUE PSORIASIS: ICD-10-CM

## 2024-06-11 DIAGNOSIS — R63.4 WEIGHT LOSS, UNINTENTIONAL: ICD-10-CM

## 2024-06-11 DIAGNOSIS — R53.82 CHRONIC FATIGUE: ICD-10-CM

## 2024-06-11 DIAGNOSIS — Z79.899 HIGH RISK MEDICATION USE: ICD-10-CM

## 2024-06-11 DIAGNOSIS — L40.50 PSORIATIC ARTHRITIS (HCC): Primary | ICD-10-CM

## 2024-06-11 DIAGNOSIS — M50.30 DDD (DEGENERATIVE DISC DISEASE), CERVICAL: ICD-10-CM

## 2024-06-11 LAB
ALBUMIN SERPL-MCNC: 3.4 G/DL (ref 3.4–5)
ALBUMIN/GLOB SERPL: 1.1 {RATIO} (ref 1–2)
ALP LIVER SERPL-CCNC: 70 U/L
ALT SERPL-CCNC: 22 U/L
ANION GAP SERPL CALC-SCNC: 5 MMOL/L (ref 0–18)
AST SERPL-CCNC: 13 U/L (ref 15–37)
BASOPHILS # BLD AUTO: 0.02 X10(3) UL (ref 0–0.2)
BASOPHILS NFR BLD AUTO: 0.3 %
BILIRUB SERPL-MCNC: 0.8 MG/DL (ref 0.1–2)
BUN BLD-MCNC: 11 MG/DL (ref 9–23)
CALCIUM BLD-MCNC: 9.1 MG/DL (ref 8.5–10.1)
CHLORIDE SERPL-SCNC: 103 MMOL/L (ref 98–112)
CO2 SERPL-SCNC: 28 MMOL/L (ref 21–32)
CREAT BLD-MCNC: 0.97 MG/DL
CRP SERPL-MCNC: <0.29 MG/DL (ref ?–0.3)
EGFRCR SERPLBLD CKD-EPI 2021: 77 ML/MIN/1.73M2 (ref 60–?)
EOSINOPHIL # BLD AUTO: 0.94 X10(3) UL (ref 0–0.7)
EOSINOPHIL NFR BLD AUTO: 13 %
ERYTHROCYTE [DISTWIDTH] IN BLOOD BY AUTOMATED COUNT: 12.9 %
ERYTHROCYTE [SEDIMENTATION RATE] IN BLOOD: 15 MM/HR
FASTING STATUS PATIENT QL REPORTED: NO
GLOBULIN PLAS-MCNC: 3.2 G/DL (ref 2.8–4.4)
GLUCOSE BLD-MCNC: 98 MG/DL (ref 70–99)
HCT VFR BLD AUTO: 44.4 %
HGB BLD-MCNC: 15.4 G/DL
IMM GRANULOCYTES # BLD AUTO: 0.02 X10(3) UL (ref 0–1)
IMM GRANULOCYTES NFR BLD: 0.3 %
LACTATE SERPL-SCNC: 1 MMOL/L (ref 0.4–2)
LDH SERPL L TO P-CCNC: 171 U/L
LYMPHOCYTES # BLD AUTO: 1.09 X10(3) UL (ref 1–4)
LYMPHOCYTES NFR BLD AUTO: 15.1 %
MCH RBC QN AUTO: 32.7 PG (ref 26–34)
MCHC RBC AUTO-ENTMCNC: 34.7 G/DL (ref 31–37)
MCV RBC AUTO: 94.3 FL
MONOCYTES # BLD AUTO: 0.89 X10(3) UL (ref 0.1–1)
MONOCYTES NFR BLD AUTO: 12.3 %
NEUTROPHILS # BLD AUTO: 4.26 X10 (3) UL (ref 1.5–7.7)
NEUTROPHILS # BLD AUTO: 4.26 X10(3) UL (ref 1.5–7.7)
NEUTROPHILS NFR BLD AUTO: 59 %
OSMOLALITY SERPL CALC.SUM OF ELEC: 281 MOSM/KG (ref 275–295)
PLATELET # BLD AUTO: 203 10(3)UL (ref 150–450)
POTASSIUM SERPL-SCNC: 4.1 MMOL/L (ref 3.5–5.1)
PROCALCITONIN SERPL-MCNC: <0.05 NG/ML (ref ?–0.16)
PROT SERPL-MCNC: 6.6 G/DL (ref 6.4–8.2)
RBC # BLD AUTO: 4.71 X10(6)UL
SODIUM SERPL-SCNC: 136 MMOL/L (ref 136–145)
WBC # BLD AUTO: 7.2 X10(3) UL (ref 4–11)

## 2024-06-11 PROCEDURE — 86140 C-REACTIVE PROTEIN: CPT

## 2024-06-11 PROCEDURE — 80053 COMPREHEN METABOLIC PANEL: CPT

## 2024-06-11 PROCEDURE — 87040 BLOOD CULTURE FOR BACTERIA: CPT

## 2024-06-11 PROCEDURE — 83615 LACTATE (LD) (LDH) ENZYME: CPT

## 2024-06-11 PROCEDURE — 36415 COLL VENOUS BLD VENIPUNCTURE: CPT

## 2024-06-11 PROCEDURE — 85025 COMPLETE CBC W/AUTO DIFF WBC: CPT

## 2024-06-11 PROCEDURE — 84145 PROCALCITONIN (PCT): CPT

## 2024-06-11 PROCEDURE — 85652 RBC SED RATE AUTOMATED: CPT

## 2024-06-11 PROCEDURE — G2211 COMPLEX E/M VISIT ADD ON: HCPCS | Performed by: INTERNAL MEDICINE

## 2024-06-11 PROCEDURE — 99215 OFFICE O/P EST HI 40 MIN: CPT | Performed by: INTERNAL MEDICINE

## 2024-06-11 PROCEDURE — 83605 ASSAY OF LACTIC ACID: CPT

## 2024-06-11 RX ORDER — AMOXICILLIN AND CLAVULANATE POTASSIUM 875; 125 MG/1; MG/1
1 TABLET, FILM COATED ORAL 2 TIMES DAILY
COMMUNITY
Start: 2024-06-05 | End: 2024-06-19

## 2024-06-11 RX ORDER — IPRATROPIUM BROMIDE AND ALBUTEROL SULFATE 2.5; .5 MG/3ML; MG/3ML
3 SOLUTION RESPIRATORY (INHALATION) 4 TIMES DAILY PRN
COMMUNITY
Start: 2024-04-25

## 2024-06-11 NOTE — PROGRESS NOTES
RHEUMATOLOGY FOLLOW UP   Date of visit: 06/11/2024  ?  Chief Complaint   Patient presents with    Psoriatic Arthritis     3 month f/u. Not feeling great. Last week had some knee pain. Has been on several antibiotics due to cough. Imaging done. Losing weight unintentionally. Skin seems to be worse. Converted rapid score of 3.0       ASSESSMENT, DISCUSSION & PLAN   Assessment:  1. Psoriatic arthritis (HCC)    2. Plaque psoriasis    3. High risk medication use    4. DDD (degenerative disc disease), cervical    5. Immunocompromised state due to drug therapy (HCC)    6. Chronic fatigue    7. Weight loss, unintentional    8. Shortness of breath    9. Productive cough        Discussion:  Mr. Mohamud Banegas is a 83 yo man who initially presented for second opinoin regarding his joint pain and skin. States he has had a long standing history of psoriasis for over 40 years but was fairly mild until recently. He was diagnosed with seronegative RA in 2019 and started on plaquenil and prednisone with some relief of symptoms. Starting this spring/summer, he has had significant worsened skin rashes. He was evaluated by dermatology who dx pt with psoriasis. Tried otezla which seemed to work initially, however had continued skin rash. He presents for second opinion. At his initial visit, he had minimal synovitis of the joints of the hands, no signs of tenosynovitis, positive nail pitting of fingernails diffusely with some onycholysis. He does have left sided SI joint tenderness.     Since that time, he was started on cosentyx and doing well. He has tolerated so far without obvious side effects.   At a prior visit, he felt his skin worsened so we increased dosage to 300mg every 4 weeks instead of the 150mg. He continues to report active skin disease (prior exams showed dry skin but no active psoriasis). He denies joint pain or swelling like previously.   His main complaint revolves around neck/lower back  Recommended he start  formal PT since he admits to not doing an HEP as previously discussed.  He will consider pain management again, although he has been seen before and does not seem interested in injections. Okay to apply heat to the area and consider using TENS unit on the lower back.   Pt has significant DDD of the cervical spine with mild spinal stenosis but moderate to severe neural foraminal stenosis in various areas. Discussed previously that these findings may be contributing to his weakness.     Given his improvement of symptoms overall, he will continue with decreased cosentyx at 150mg (instead of 300mg) every 4 weeks and monitor response. Discussed that if worsened skin disease or joint pain, then will return to the 300mg dosing. Okay to continue topicals prn for the skin  However, he has been suffering from chronic cough and shortness of breath along with weight loss and decreased appetite.   Strongly recommended getting further infectious workup completed given the CT findings. He is considering 2nd pulmonary opinion. Discussed briefly possible bronchoscopy but pt not interested. Also discussed possible GI workup but pt not sure he would go for any endoscopic evaluation at this time.     Follow up in 3 months or sooner as needed    Patient and pt's wife verbalized understanding of above instructions. No further questions at this time.    Code selection for this visit was based on time spent (42min) on date of service in preparing to see the patient, obtaining and/or reviewing separately obtained history, performing a medically appropriate examination, counseling and educating the patient/family/caregiver, ordering medications or testing, referring and communicating with other healthcare providers, documenting clinical information in the E HR, independently interpreting results and communicating results to the patient/family/caregiver and care coordination with the patient's other providers.    ?  Plan:  Diagnoses and all  orders for this visit:    Psoriatic arthritis (HCC)    Plaque psoriasis    High risk medication use    DDD (degenerative disc disease), cervical    Immunocompromised state due to drug therapy (HCC)  -     LDH; Future  -     Lactic Acid, Plasma; Future  -     Sed Rate, Westergren (Automated) [E]; Future  -     C-Reactive Protein [E]; Future  -     CBC W Differential W Platelet [E]; Future  -     Comp Metabolic Panel (14) [E]; Future  -     Blood Culture; Future  -     Procalcitonin; Future    Chronic fatigue  -     LDH; Future  -     Lactic Acid, Plasma; Future  -     Sed Rate, Westergren (Automated) [E]; Future  -     C-Reactive Protein [E]; Future  -     CBC W Differential W Platelet [E]; Future  -     Comp Metabolic Panel (14) [E]; Future  -     Blood Culture; Future  -     Procalcitonin; Future    Weight loss, unintentional  -     LDH; Future  -     Lactic Acid, Plasma; Future  -     Sed Rate, Westergren (Automated) [E]; Future  -     C-Reactive Protein [E]; Future  -     CBC W Differential W Platelet [E]; Future  -     Comp Metabolic Panel (14) [E]; Future  -     Blood Culture; Future  -     Procalcitonin; Future    Shortness of breath  -     LDH; Future  -     Lactic Acid, Plasma; Future  -     Sed Rate, Westergren (Automated) [E]; Future  -     C-Reactive Protein [E]; Future  -     CBC W Differential W Platelet [E]; Future  -     Comp Metabolic Panel (14) [E]; Future  -     Blood Culture; Future  -     Procalcitonin; Future    Productive cough  -     LDH; Future  -     Lactic Acid, Plasma; Future  -     Sed Rate, Westergren (Automated) [E]; Future  -     C-Reactive Protein [E]; Future  -     CBC W Differential W Platelet [E]; Future  -     Comp Metabolic Panel (14) [E]; Future  -     Blood Culture; Future  -     Procalcitonin; Future              Return in about 3 months (around 9/11/2024).  ?  HPI   Mohamud Banegas is a 84 year old male with the following active problems who was seen initially for second  opinion regarding his psoriasis/psoriatic arthritis. He was previously on Otezla and is now on Cosentyx. He presents for follow up today.      Since his last visit, he hasn't been doing great.  Was having worsened cough and gets coughing spells that can be somewhat violent. Occasionally productive.   Using peak flow to try to help produce the mucous.   Has been on abx- amoxicillin and prednisone without much relief. Also took cefdinir and zpack and still no relief   Is following with pulmonology   Recently had CT of the chest and sinuses. Has swallow study planned for tomorrow  Put on amoxicillin again for longer period.  Has lost about 10# over the past few months. Appetite not the best due to nausea. Denies vomiting. Denies abdominal pain/bloating or diarrhea or blood in stools.   Has been sleeping more too  Difficulty concentrating at times.   Does have hx of COPD/emphysema- no change in inhaler recently. Using nebulizer and even rescue inhaler.     Prior abd pain has subsided and dx with pinched nerve. Went to PT which helped.     Did delay one of the cosentyx dosages due to abx. Did take recently only the 150mg.   Did have some pain in the knee which has since resolved.   Thinks some spots of psoriasis. Can be itchy, is using topicals.   Can get some dysphagia and coughing.     Denies significant joint pain, states manageable. Can happen in the knees. Denies overt swelling of the joints.   Denies significant morning stiffness.    Denies any further falls/fractures. Continues K2 supplementation.       HPI from initial consultation  referred for rheumatologic evaluation due to second opinion regarding psoriasis/psoriatic arthritis.     Has suffered from plaque psoriasis for several years (over 40 years). Initially was affecting the hands and fingernail and over time affected other areas diffusely.    Then around 2019, he he suffered a wound on the left leg, states he had swelling in the foot/leg. Had DVT ruled  out and eventually was diagnosed with arthritis by his PCP. Was placed on low dose prednisone with no relief, then when placed on 20mg had improvement with higher dose.   Had pain/swelling in the left foot, then the right foot then both hands and wrists. Was so difficult he couldn't even turn a key.   Was evaluated by another rheumatologist and was diagnosed with seronegative rheumatoid arthritis. Had recommended methotrexate but pt did research and did not want to take medication. Was placed on hydroxychloroquine in March of 2020. Has been taking since that time. States seemed like the medication helped with most of the joint pain with exception of the feet being swollen.  Swelling does improve with elevating the legs.   Had difficulty walking up/down the stairs due to the \"arthritis\" pt just states different areas bothering him.  Had significant flare of psoriasis over the summer and was referred to dermatologist. Was given different topicals  Was given samples of Otezla per pt preference. Started on starter pack but was not able to get approved through insurance. After completing sample pack, he was able to get generic from [place] but still having skin flaring while on the generic.  Was seen by dermatology- Dr. Mccann again, and started prednisone (as high as 40mg) and did another course of steroids. Had biopsy of a few lesions and is waiting to find pathology.   Has lesions over belly, chest, back, arms, and legs. States very itchy.     Denies current joint pain, but taking prednisone currently. States was having some wrist and hand pain when off the steroids.   + fingernail changes from psoriasis  + middle finger PIPs have ramsey swollen  + chronic contracture of the left pinky - did have surgery but had recurrence.     + morning stiffness and difficult walking, lasted throughout the day when severe, but much better when on otezla alone   + altered gum sensation and some pain  + hx of problem with his eye, inflamed  per pt. Saw Dr. Swann, required steroid eye drops and topical abx (a few months ago)  + hx of pancreatitis- thought related to pancreatic divisum; and elevated cholesterol   + constipation while on plaquenil resolved since that time  + some left sided low back pain, typically at night and can be difficult to get into a comfortable position  + difficulty falling asleep at night- take ativan; was given mirtazapine but isn't helping. Not necessarily due to pain itself.   + dry mouth throughout the day, improved with prednisone, also drinking a lot of water       Hx of SVT s/p ablation  Some difficulty swallowing typically when laying hardik  Hx of KI   Hx of diverticulosis without hx of diverticulitis       The patient denies oral or nasal ulcers, Raynaud's phenomenon, prior hematologic abnormality, prior renal or liver disease, or history of seizures.  No history of prior blood clot in the legs or lungs, strokes or ischemic phenomenon.  Denies nonhealing ulcers on the fingertips.  The patient denies any crampy abdominal pain, diarrhea, bloody stools, nodular painful shin bruises, Achilles heel pain or symptoms of enthesitis.  There are no symptoms of severe dry eyes.  No fevers, chills, lymphadenopathy, unexpected weight loss,      Past Medical History:  Past Medical History:    Allergic rhinitis    COPD    COPD (chronic obstructive pulmonary disease) (HCC)    Empyema    GERD    GERD (gastroesophageal reflux disease)    Heart disease    High cholesterol    OTHER DISEASES    PVD    Pancreas divisum    Pancreatitis x 1 in 5/10    Rotator cuff tear    RIGHT SHOULDER    Seronegative rheumatoid arthritis (HCC)    SLEEP APNEA    AHI 15, REM 25, amirah 88%    Spermatocele     Past Surgical History:  Past Surgical History:   Procedure Laterality Date    Colonoscopy  > 10 years ago    Ndsc ablation & rcnstj [facility] w/o bypass      Repair ing hernia,5+y/o,reducibl  01/01/1952     Family History:  Family History   Problem  Relation Age of Onset    Other (Other) Father         COPD, CKD    Cancer Mother         breast     Social History:  Social History     Socioeconomic History    Marital status:    Occupational History    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   Tobacco Use    Smoking status: Former     Current packs/day: 0.00     Average packs/day: 1 pack/day for 63.7 years (63.7 ttl pk-yrs)     Types: Cigarettes     Start date: 1955     Quit date: 2018     Years since quittin.7    Smokeless tobacco: Never   Vaping Use    Vaping status: Never Used   Substance and Sexual Activity    Alcohol use: Not Currently     Alcohol/week: 0.0 standard drinks of alcohol     Comment: very rare    Drug use: No   Other Topics Concern    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      amoxicillin clavulanate 875-125 MG Oral Tab Take 1 tablet by mouth 2 (two) times daily.      Menaquinone-7 (VITAMIN K2) 100 MCG Oral Cap Take 1 capsule by mouth daily.      clobetasol 0.05 % External Cream Apply to affected area BID for a week then wait at least a week to apply again 60 g 2    Secukinumab, 300 MG Dose, (COSENTYX SENSOREADY, 300 MG,) 150 MG/ML Subcutaneous Solution Auto-injector Inject 300 mg into the skin every 30 (thirty) days. 1 each 2    rosuvastatin 5 MG Oral Tab 0.5 tablets (2.5 mg total).      Black Pepper-Turmeric (TURMERIC COMPLEX/BLACK PEPPER OR) Take 1,000 mg by mouth daily.      VALSARTAN 80 MG Oral Tab Take 1 tablet (80 mg total) by mouth daily.      mupirocin 2 % External Ointment Apply 1 Application topically 3 (three) times daily.      Fluticasone Furoate  (FLONASE SENSIMIST) 27.5 MCG/SPRAY Nasal Suspension 2 sprays by Nasal route daily.      omega-3 fatty acids 1000 MG Oral Cap Take 1,000 mg by mouth daily.      aspirin 81 MG Oral Tab EC Take 1 tablet (81 mg total) by mouth every other day.      LORAZEPAM 1 MG Oral Tab TAKE 1 TABLET(1 MG) BY MOUTH TWICE DAILY AS NEEDED FOR ANXIETY 30 tablet 0    triamcinolone 0.1 % External Cream       fluticasone-umeclidin-vilant (TRELEGY ELLIPTA) 100-62.5-25 MCG/INH Inhalation Aerosol Powder, Breath Activated Inhale 1 puff into the lungs daily. 3 each 3    ZINC OR Take 50 mg by mouth.      Montelukast Sodium (SINGULAIR) 10 MG Oral Tab Take 1 tablet (10 mg total) by mouth daily. 90 tablet 3    Coenzyme Q10 (COQ10) 100 MG Oral Cap Take 30 mg by mouth daily.      Albuterol Sulfate HFA (PROAIR HFA) 108 (90 BASE) MCG/ACT Inhalation Aero Soln Inhale 2 puffs into the lungs every 6 (six) hours as needed for Wheezing. 1 Inhaler 0    magnesium 250 MG Oral Tab Take 100 mg by mouth as needed.        VITAMIN D 1000 UNIT OR TABS Take 4,000 Units by mouth.         Modified Medications    No medications on file     Medications Discontinued During This Encounter   Medication Reason    tiZANidine 2 MG Oral Tab        ?  ?  Allergies:  Allergies   Allergen Reactions    Aspirin NAUSEA ONLY     HIGHER DOSAGES. LOW DOSE ASPIRIN OK    Bactrim [Sulfamethoxazole W/Trimethoprim] RASH    Motrin Ib NAUSEA ONLY    Other UNKNOWN    Sulfamethoxazole UNKNOWN    Amlodipine RASH    Ibuprofen NAUSEA ONLY     ?  REVIEW OF SYSTEMS   ?  Review of Systems   Constitutional:  Positive for malaise/fatigue and weight loss. Negative for chills and fever.        Gets cold easily   HENT:  Positive for hearing loss. Negative for congestion, nosebleeds and sinus pain.    Eyes:  Negative for pain and redness.   Respiratory:  Positive for cough, sputum production and shortness of breath (hx COPD). Negative for hemoptysis.    Cardiovascular:  Negative for chest pain, palpitations  and leg swelling.   Gastrointestinal:  Positive for constipation (stable), heartburn (intermittent) and nausea. Negative for abdominal pain, blood in stool, diarrhea and vomiting.   Genitourinary:  Negative for dysuria, hematuria and urgency.   Musculoskeletal:  Positive for back pain and joint pain. Negative for myalgias.   Skin:  Positive for rash. Negative for itching.   Neurological:  Positive for weakness (generalized). Negative for dizziness, tingling and headaches.   Endo/Heme/Allergies:  Positive for environmental allergies.   Psychiatric/Behavioral:  The patient is nervous/anxious.      PHYSICAL EXAM   Today's Vitals:  Temperature Blood Pressure Heart Rate Resp Rate SpO2   Temp: 97.3 °F (36.3 °C) BP: 132/52 Pulse: 88 Resp: 16 SpO2: 98 %   ?  Current Weight Height BMI BSA Pain   Wt Readings from Last 1 Encounters:   06/11/24 133 lb (60.3 kg)    Height: 5' 6\" (167.6 cm) Body mass index is 21.47 kg/m². Body surface area is 1.68 meters squared.         Physical Exam  Vitals and nursing note reviewed.   Constitutional:       General: He is not in acute distress.     Appearance: He is well-developed. He is not diaphoretic.      Comments: thin   HENT:      Head:      Comments: Slight scar from recent laceration      Ears:      Comments: + hearing aid  Eyes:      General: No scleral icterus.     Extraocular Movements: Extraocular movements intact.      Conjunctiva/sclera: Conjunctivae normal.   Neck:      Vascular: No JVD.      Trachea: No tracheal deviation.   Cardiovascular:      Rate and Rhythm: Normal rate and regular rhythm.   Pulmonary:      Effort: Pulmonary effort is normal. No respiratory distress.      Breath sounds: No wheezing.      Comments: Decreased breath sounds at right base  Musculoskeletal:         General: Deformity present. No swelling or tenderness.      Cervical back: Neck supple.      Comments: B/l cmc enlargement wo synovitis  Left pinky finger post-raumatic  No swelling, tenderness,  redness or restriction of motion of the DIPs, pips, MCPs, wrists, elbows, ankles, or joints of the feet.  Chronic contracture of left pinky   Bilateral knees without medial joint line tenderness, mild crepitus, no effusion.  Neck flexed and difficulty straightening; thoracic kyphosis   Previously noted- Pes planus; Left lateral mid foot nodule without surrounding synovitis   Lymphadenopathy:      Cervical: No cervical adenopathy.   Skin:     General: Skin is warm and dry.      Coloration: Skin is pale.      Findings: No erythema or rash.      Comments: Significant psoriaform lesions noted over chest, abdomen, back, upper arms -- resolved  + diffusely nail pitting; scattered onycholysis -- resolved    Neurological:      Mental Status: He is alert and oriented to person, place, and time.      Cranial Nerves: No cranial nerve deficit.      Gait: Gait abnormal.   Psychiatric:         Mood and Affect: Mood normal.         Behavior: Behavior normal.       ?  Radiology review:       DATE OF SERVICE: 06.04.2024  CT CHEST    CLINICAL INFORMATION: Cough, unspecified type    COMPARISON: CT chest 6/20/2023.    TECHNIQUE:  Routine helical scanning was performed through the chest without contrast.    Automated exposure control and ALARA manual techniques for patient specific dose reduction were  followed while maintaining the necessary diagnostic image quality.    FINDINGS:  Evaluation of the mediastinal and vascular structures is suboptimal without IV contrast.    SUPPORT DEVICES:  None.  THORACIC INLET:Normal.  HEART: Coronary artery atherosclerosis..  AORTA:Aortic atherosclerosis.  PULMONARY ARTERIES:Normal.  MEDIASTINUM/MEME:  Normal.  ESOPHAGUS:Normal.  CHEST WALL:  Normal.  AXILLA:Normal.  MUSCULOSKELETAL:  Degenerative changes of the spine.  UPPER ABDOMEN: Normal.  TRACHEA:Normal.  LUNGS/PLEURA:  Moderate panlobular emphysema.    Lower lobe bronchial wall thickening and mucous plugging with associated interstitial  and  groundglass opacities. Similar findings are also seen at the lingula.    Scattered 3 mm pulmonary micronodules (series 3 image 50, 75, 167) are stable.  Previously noted interval left upper lobe 3.5 mm nodule has resolved.    =====  IMPRESSION:    1.  Lower lobe and lingular bronchial wall thickening, mucous plugging with associated opacities.  These are new from 6/28/2023 and suggest an infectious or inflammatory bronchitis.  2.  Stable pulmonary micronodules.    Clinicians: If you have any questions or concerns regarding the study or report, contact the  interpreting radiologist Haris Ruffin MD directly at extension [phone].  Exam End: 06/04/24  7:11 PM    Specimen Collected: 06/05/24  9:24 AM Last Resulted: 06/05/24  9:33 AM     DATE OF SERVICE: 06.04.2024   CLINICAL INDICATION: 84 years-old Male with  Cough, unspecified type.     TECHNIQUE: Axial paranasal sinus acquisition, with sagittal and coronal reformations.   Contrast: Noncontrast.   Dose reduction CT scan done according to ALARA (As Low as Reasonably Achievable) or ALARA/IMAGE   GENTLY.     COMPARISON: CT facial bones, 7/202019.     FINDINGS:   Nasal cavity (turbinates, septum): NO nasal cavity masses or large polyps are identified. There is   moderate bilateral turbinate hypertrophy and mucosal thickening with bilateral large middle   turbinate samuel bullosa narrowing the central nasal passageways. The bilateral middle turbinates   show paradoxical morphology.   Septum: There is mild nasal septal bowing to the LEFT with a small spur contacting the LEFT inferior   turbinate. Additional mild 3 mm spurring along the posterior aspect of the RIGHT nasal septum is   noted.     SINUSES AND DRAINAGE PATHWAYS:   RIGHT:   Frontal sinus and frontal recess: Mild mucosal thickening at the inferior RIGHT frontal sinus   narrowing the frontoethmoidal recess.   Maxillary sinus: Clear. There is a patent accessory maxillary ostium.   Ethmoid sinuses: Clear.    Ostiomeatal complex: Patent   Sphenoid sinus: Clear.  Sphenoethmoidal recess: Clear.     LEFT:   Frontal sinus and frontal recess: Clear.   Maxillary sinus: Clear. There are healed LEFT maxillary sinus and zygoma fractures. There is a   patent accessory maxillary ostium.   Ethmoid sinuses: Clear.   Ostiomeatal complex: Patent with a small infraorbital ethmoidal air (Beto) cell.   Sphenoid sinus: Clear.   Sphenoethmoidal recess: Clear.     ANATOMIC VARIATIONS:   Ethmoid roofs: RIGHT Keros II morphology. LEFT Keros II morphology. The olfactory fossae and   cribriform plates are morphologically intact without CT evidence of congenital osseous dehiscence.   Frontal air cells: A RIGHT type IV frontal air cell is present.   Sphenoid sinus: Sellar/post-sellar pneumatization of the sphenoid sinus. Intact appearance of the   osseous sellar floor.Nonpneumatized yanet bandar, clinoid processes, and sphenoid pterygoid   recesses. NO Onodi air cells.   Carotid canals: The osseous coverings of the carotid canals are intact.   Lamina papyracea: Intact bilaterally.   Ethmoidale notches: Anterior ethmoidal notches are protected, abutting the fovea ethmoidalis.   Orbits: There are bilateral lens replacements noted.   Anterior cranial fossa: Intact.   Maxillary teeth: NO CT evidence of odontogenic sinusitis. Intact hard palate.   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   Other findings: The visualized portions of the middle ear cavities and mastoid air cells are clear.   A few punctate calcifications are noted in the LEFT parotid salivary gland.   Nasopharynx/oropharynx: NO nasopharyngeal or tonsillar mass detected.     DATE OF SERVICE: 03.20.2024  CT ABDOMEN+PELVIS(CONTRAST ONLY)(CPT=74177)    CLINICAL INDICATION: Right sided abdominal pain.    COMPARISON STUDY: None available.    TECHNIQUE:  Axial images of the abdomen and pelvis were performed from the lung bases through the  pubic symphysis after the injection of  nonionic intravenous contrast.  Oral contrast was not used  for the examination.  80 mL of Isovue 370 were administered intravenously.    Automated exposure control and ALARA manual techniques for patient specific dose reduction were  followed while maintaining the necessary diagnostic image quality.    ADVERSE REACTION: None.    FINDINGS:    LUNG BASES: There are emphysematous changes in the lungs.    LIVER: There are subcentimeter hypodensities too small to further characterize in the liver    GALLBLADDER/BILIARY TREE: Normal.    PANCREAS: Normal.    SPLEEN: Normal    ADRENAL GLANDS: Normal.    KIDNEYS URETERS AND BLADDER: Normal.    PELVIC ORGANS: Prostate gland is present.    ABDOMINAL AORTA: Abdominal aorta is normal in caliber. There are prominent atherosclerotic  calcifications and plaque in the abdominal aorta and its branch vessels. There is high-grade  stenosis or occlusion of the left common iliac artery.    LYMPH NODES: There is no evidence of mesenteric, retroperitoneal or inguinal adenopathy.    BOWEL: There are diverticula in the colon. There is a moderate to large amount of stool in the  colon. The appendix is normal in caliber. Small bowel loops are normal in caliber.    PERITONEUM: No free fluid.    ABDOMINAL WALL: There is a small right inguinal hernia containing fat. There is protrusion of a  couple small bowel loops towards the hernia. There is no obstruction.    OSSEOUS STRUCTURES: There are degenerative changes in the lumbar spine. There is grade 1  anterolisthesis of L5 on S1 and bilateral spondylolysis at L5.    =====  IMPRESSION:  1. 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 Colonic diverticulosis and moderate to large amount stool in the colon  3. Small right inguinal hernia containing fat with protrusion of a couple small bowel loops towards  the hernia sac without obstruction  Exam End:  03/20/24 11:26 AM    Specimen Collected: 03/20/24 11:49 AM Last Resulted: 03/20/24 12:01 PM     DATE OF SERVICE: 10.01.2023  MRI CERVICAL SPINE    CLINICAL INDICATION: Cervical radiculopathy  upper back pain. Fall July 12, 2023 with C4  spinous process fracture    TECHNIQUE: Multiplanar and multisequence MR imaging was performed through the cervical spine  using the standard MR protocol.    COMPARISON:   Report CT cervical spine 7/12/2023 from [facility]    CONTRAST: None    FINDINGS:    Alignment: Straightening and reversal normal cervical lordosis . Grade 1 C3-C4 and C4-C5  anterolisthesis and C5-C6 retrolisthesis.  Vertebrae: Vertebral body heights are maintained. The known C4 spinous process fracture is less  apparent on MRI than on the previous reported CT. Multilevel Modic 1/2 endplate degenerative changes  otherwise unremarkable marrow signal.  Cervical Cord:   Cerebellar tonsils are appropriately positioned.  Cervical medullary junction is unremarkable.  Cervical cord has maintained caliber and signal.    Discs: Diffuse disc desiccation. There is mild C3-C4 and C4-C5, severe C5-C6 and C6-C7 disc space  narrowing. Multilevel osteophytosis.  Disc levels:  C1-C2: Unremarkable.  Central canal: No stenosis.  C2-C3: Severe left and moderate right facet arthropathy.  Central canal: No stenosis.  Neural foramen:  Mild left and no right foraminal stenosis.  C3-C4: Grade 1 anterolisthesis. Disc osteophyte complex with moderate left and mild to  moderate right facet arthropathy.  Central canal: Mild stenosis  Neural foramen:  Moderate right and severe left foraminal stenosis.  C4-C5: Grade 1 anterolisthesis. Disc osteophyte complex with severe right and mild left facet  arthropathy.  Central canal: Mild stenosis  Neural foramen:  Moderate right greater than left foraminal stenosis.  C5-C6: Disc osteophyte complex with moderate right and mild left facet arthropathy. There is  diastases of the left facet joint  which is measuring 1.2 cm (series 602, image 5). There is no  evidence of jumped or perched facet. No evidence of fracture by MR.  Central canal: Mild stenosis  Neural foramen:  Moderate right greater than left stenosis.  C6-C7: Disc osteophyte complex eccentric towards the left with mild facet arthropathy  Central canal: No stenosis  Neural foramen:  Severe left and moderate right foraminal stenosis.  C7-T1: Unremarkable  Central canal: No stenosis.  Neural foramen:  No bilateral neural foraminal stenosis.  T1-T2: Disc osteophyte complex. T1 left posterior lateral bone island. No central canal stenosis.  Mild left and moderate right foraminal stenosis.    Paraspinal Soft Tissues: Interspinous ligamentous edema at the C5-C6 level indicating underlying  ligamentous injury.  Other findings: None..    =====  IMPRESSION:  1.  C5-C6 left facet joint diastases with interspinous ligamentous injury at C5-C6. Given the  degree of diastases, stability of the facet joint is indeterminate and given the possibility of  instability, reevaluation with CT cervical spine is recommended to assess for underlying new osseous  injury which may be MR occult.  2.  The above findings were communicated by telephone to Abby Freedman NP by Dr. Leslee Hammond on  10/2/2023 3:30 PM.  3.  Multilevel cervical spondylosis with spinal stenosis. There is advanced spinal stenosis  involving the foraminal levels of C6-C7 and to a lesser degree C3-C4 through C5-C6. There is mild  multilevel central canal stenosis involving C3-C4 through C5-C6.  Exam End: 10/01/23  3:20 PM    Specimen Collected: 10/02/23  1:49 PM Last Resulted: 10/02/23  3:35 PM   Received From: [facility]  Result Received: 11/20/23  2:30 PM     Narrative                 Impression   PROCEDURE:  CT BRAIN OR HEAD (84045)     COMPARISON:  C[facility] CT, CT BRAIN OR HEAD (71832), 7/12/2023, 2:05 PM.     INDICATIONS:  Left eye droop     TECHNIQUE:  Noncontrast CT scanning is  performed through the brain. Dose reduction techniques were used. Dose information is transmitted to the ACR (American College of Radiology) NRDR (National Radiology Data Registry) which includes the Dose Index  Registry.     PATIENT STATED HISTORY: (As transcribed by Technologist)  Left eye droop      FINDINGS:  No evidence of intracranial hemorrhage or extra-axial fluid collection.  Lucencies in the deep periventricular white matter are likely sequelae of chronic small vessel ischemic disease.  Prominence of the sulci.  No mass effect.  Visualized portions of paranasal sinuses are unremarkable.  Visualized portions of the mastoid air cells are unremarkable.  Visualized portions of the orbits are unremarkable.  IMPRESSION:  Sequelae of chronic small vessel ischemic disease is noted. No evidence of intracranial hemorrhage or extra-axial fluid collection.           LOCATION:  [facility]        Dictated by (CST): Jakub Howard MD on 7/14/2023 at 10:28 AM      Finalized by (CST): Jakub Howard MD on 7/14/2023 at 10:31 AM      Narrative   PROCEDURE:  CT SPINE CERVICAL (CPT=72125)     COMPARISON:  None.     INDICATIONS:  fall, +LOC, lac to back of head     TECHNIQUE:  Noncontrast CT scanning of the cervical spine is performed from the skull base through C7.  Multiplanar reconstructions are generated.  Dose reduction techniques were used. Dose information is transmitted to the ACR (American College of  Radiology) NRDR (National Radiology Data Registry) which includes the Dose Index Registry.     PATIENT STATED HISTORY: (As transcribed by Technologist)  Patient states he fell today.         FINDINGS:  Cervical spine demonstrates normal vertebral body height and alignment.  Adelina Multilevel degenerative changes are present.  The spinal canal is patent.  Evaluation of the soft tissues demonstrates a hematoma in the left paraspinal musculature  measuring up to 5.3 x 2.9 cm. Lung apices are within normal limits.                    Impression   CONCLUSION:       1. Hematoma in the left paraspinal musculature is noted.       2. Fracture at the tip of the C4 spinous process.        LOCATION:  [facility]        Dictated by (CST): Yariel Hammond MD on 7/12/2023 at 2:28 PM      Finalized by (CST): Yariel Hammond MD on 7/12/2023 at 2:33 PM         Exam: CTA ABDOMEN  and  PELVIS WW/O CONTRAST   CPT Code(s): 40567,-762 - CT ANGIO ABD and PELV W/O and W/DYE,Omnipaque 350 100 mL vial     INDICATION:  Crescentic focus of increased T1 signal intensity within the wall of the aorta on a lumbar spine MRI study. It was uncertain if this represented calcification or intramural hemorrhage. A CTA study was recommended for further evaluation.     COMPARISON:  5/12/2022 lumbar spine MRI study.     TECHNIQUE: Routine CTA study of the abdomen and pelvis performed with 100 cc of intravenous Omnipaque 350.  Sagittal and coronal reconstructed images. This study was performed on a Siemens Somatom Go CT scanner with both CARE Dose 4D tube modulation and   SAFIRE iterative reconstruction for radiation dose reduction.     FINDINGS:   LUNG BASES: Nonspecific linear atelectasis versus scarring is identified at the lung bases bilaterally. The visualized lung bases are otherwise clear with no focal airspace consolidation. There is no evidence of pleural fluid. Heart size is within normal    limits.     ABDOMEN:  The liver, spleen, pancreas, adrenal glands, and adrenal glands, and kidneys are normal. The gallbladder is unremarkable with no evidence of gallstones or gallbladder wall thickening.     The abdominal aorta is normal in caliber with extensive atherosclerotic plaque and atherosclerotic vascular calcifications identified corresponding to the crescentic areas of T1 hyperintense signal seen on the MRI study. There is no evidence of an   intramural hematoma or aortic dissection. The celiac and superior mesenteric artery origins are widely patent bilaterally.  There is a single widely patent left renal artery with 3 smaller caliber, but widely patent right renal arteries. There is focal   atherosclerotic ulceration of the inferior mesenteric artery with no significant stenosis. 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 There is mild tortuosity and dilatation of the proximal right common iliac artery measuring up to 1.6 cm. There is a weblike atherosclerotic plaque within the proximal right common iliac artery with no   significant right common iliac artery stenosis. Atherosclerotic calcifications are identified elsewhere within the external and internal iliac arteries with no other significant stenosis.     PELVIS: There is mild to moderate nonspecific heterogeneous enlargement of the prostate gland. The bladder is unremarkable with no significant bladder wall thickening. Normal caliber pelvic bowel loops with no evidence of bowel wall thickening. Normal   caliber bowel with no evidence of bowel wall thickening. Sigmoid colon diverticulosis is present with no evidence of diverticulitis. The appendix is normal. There is no evidence of free fluid or lymphadenopathy throughout the abdomen or pelvis. There is   a redemonstrated grade 1 anterolisthesis of L4 on L5 with bilateral L5 pars defects. Severe diffuse moderate to severe diffuse lumbar spondylosis and degenerative facet disease are again identified. The bone structures otherwise unremarkable with no   lytic or sclerotic bone lesions.     IMPRESSION:   1. 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 This finding was discussed by phone with Dr. Hernandez. Conventional angiography is recommended for further  evaluation.   2. Extensive atherosclerotic plaque and extensive atherosclerotic calcifications throughout the abdominal aorta and iliac vessels. No evidence of aortic dissection or intramural hematoma. There is mild tortuosity and dilatation of the right common iliac   artery with a weblike plaque. No significant right common iliac artery narrowing is appreciated.   3. Nonspecific linear atelectasis versus scarring at the lung bases bilaterally.   4. Incidental findings include mild to moderate nonspecific heterogeneous enlargement of the prostate glands, sigmoid colon diverticulosis, a redemonstrated grade 1 anterolisthesis of L4 on L5 with bilateral L5 pars defects, and moderate to severe   diffuse lumbar spondylosis which is better evaluated on on the lumbar spine MRI study.     Interpreting Radiologist:     Dave Pereira M.D.   Electronically Signed: 05/13/2022 04:08 PM    Exam: MRI LUMBAR SP W/O CONTRAST   CPT Code(s): 89179 - MRI LUMBAR SPINE W/O DYE     MRI LUMBAR SPINE WITHOUT CONTRAST     HISTORY: Low back pain, unspecified     COMPARISON: None currently available.     TECHNIQUE: Routine MRI exam performed on a wide bore ultra high field 3.0 T Siemens Skyra MR scanner, without intravenous contrast.     FINDINGS: Convex left curvature of the lumbar spine measuring 16 degrees. Vertebral body heights are maintained. Grade 1 retrolisthesis at the L4-5 level. Grade 1 anterolisthesis at the L5-S1 level. Multilevel discogenic reactive marrow changes. The tip   of the conus medullaris is at the L1 level. Visualized spinal cord and cauda equina have a normal appearance. Paraspinous musculature appears demonstrate mild to moderate fatty atrophy. Multilevel atherosclerosis of the abdominal aorta. In the distal   aorta there crescentic areas of increased signal on T1-weighted sequences which may represent calcification however the possibility of an intramural hemorrhage cannot be excluded.     T11-12: Disc  degeneration characterized by disc desiccation and a broad posterior disc bulge. Bilateral facet arthropathy. No central canal stenosis. Moderate to severe right and mild left neural foraminal narrowing.     T12-L1: Disc degeneration characterized by disc desiccation, 30-60% loss of disc height and a shallow posterior disc bulge with a superimposed right subarticular protrusion measuring 3 to 4 mm AP dimension. No significant central canal stenosis. Mild   right and no left neural foraminal narrowing. Mild bilateral facet arthropathy.     L1-2: Disc degeneration characterized by disc desiccation and a shallow posterior disc bulge. Disc height is maintained. Mild facet arthropathy and ligamentum flavum hypertrophy. No significant central canal stenosis or neural foraminal narrowing.     L2-3: Disc degeneration characterized by disc desiccation, 30-60% loss of disc height and a broad posterior disc bulge. Mild to moderate ligamentum flavum hypertrophy and mild facet arthropathy. No central canal stenosis. Mild bilateral neural foraminal   narrowing.     L3-4: Disc degeneration characterized by disc desiccation, 30-60% loss of disc height and a broad posterior disc bulge. Mild to moderate ligamentum flavum hypertrophy and mild facet arthropathy. No central canal stenosis. Mild bilateral neural foraminal   narrowing.     L4-5: Grade 1 retrolisthesis. Disc degeneration characterized by disc desiccation, greater than 60% loss of disc height and a broad posterior disc bulge. Dorsal annular fissure. Mild facet arthropathy and ligamentum flavum hypertrophy. Narrowing of the   left subarticular recess with encroachment on the left L5 nerve root. Moderate left neural foraminal narrowing. Mild right neural foraminal narrowing.     L5-S1: Grade 1 anterolisthesis. Disc degeneration characterized by disc desiccation and a broad posterior disc bulge. Mild facet arthropathy and ligamentum flavum hypertrophy. No central canal  stenosis. Moderate to severe right and severe left neural   foraminal narrowing.     IMPRESSION:   1. L5-S1: Grade 1 anterolisthesis combined with multifactorial degenerative changes results in moderate to severe right and severe left neural foraminal narrowing. No central canal stenosis.   2. L4-5: Grade 1 retrolisthesis combined with multifactorial degenerative changes results in marked narrowing of the left subarticular recess with encroachment on the left L5 nerve root, moderate left neural foraminal narrowing and mild right neural   foraminal narrowing.   3. L3-4: Mild bilateral neural foraminal narrowing. No central canal stenosis.   4. L2-3: Mild bilateral neural foraminal narrowing. No central canal stenosis.   5. Diffuse aortic atherosclerosis. Crescentic area of increased signal on T1 sequences in the distal aorta may represent calcification, however, an intramural hemorrhage cannot be excluded based on this exam. A CTA of the abdominal aorta is recommended   for further evaluation. This finding and recommendations were discussed with Dr. Hernandez at the time of this interpretation with voiced understanding.     Interpreting Radiologist:     Armani Grimes M.D.   Electronically Signed: 05/13/2022 09:06 AM    DATE OF SERVICE: 12.23.2021   SACROILIACS, 1 FRONTAL AND 2 OBLIQUE VIEWS     CLINICAL INFORMATION:  Psoriatic arthritis (HCC).     COMPARISON STUDY: None.     FINDINGS:       There are no definite acute fractures or dislocations.     No radiographic signs of ankylosis/fusion or significant degenerative disease.     No bony lesions are identified.     Diffuse osteopenia is present.     Impression   IMPRESSION:     Unremarkable radiographic examination of the sacroiliac joints.      DATE OF SERVICE: 12.23.2021   LUMBAR SPINE AP, LATERAL, FLEXION, AND EXTENSION VIEWS     CLINICAL INDICATION: Chronic left-sided low back pain     COMPARISON: None     FINDINGS: Routine views of the lumbar spine show 5  lumbar-type vertebral bodies.     There is levoscoliosis of the thoracolumbar spine. There is grade 1 spondylolisthesis of L5 on S1.   Otherwise, overall alignment of the lumbar spine is within normal limits.. The vertebral body   heights appear well-maintained. The visualized pedicles appear grossly intact.     No definite fracture or subluxation is seen. Flexion and extension views demonstrate no significant   abnormal movements.     Decreased disc height, endplate osteophytes, and/or endplate sclerotic changes consistent with   degenerative spondylosis. Posterior facet arthropathy notable at the mid to lower lumbar spine and   particularly at the lumbosacral junction.     If there is continued clinical concern, cross-sectional imaging may be considered depending on the   remainder of the patient's clinical situation.       Extensive arteriovascular calcifications of the abdominal aorta noted.     Impression   IMPRESSION:   Moderate to severe lumbar spondylosis including degenerative disc disease and posterior facet   arthropathy as detailed above.        PROCEDURE:  MRI BRAIN MRA HEAD+MRA NECK (ALL W+WO) (CPT=70553/86793/12502)       COMPARISON:  None.       INDICATIONS:  eval CVA       TECHNIQUE:  MRI of the brain was performed with multi-planar T1, T2-weighted images with FLAIR sequences and diffusion weighted images without and with infusion.  MR angiography of the brain without and with infusion and MR angiography of the neck   without and with infusion was performed using 3D time of flight, multi-planar and 3D reconstructed images. All measurements obtained in this exam were performed using NASCET criteria.       PATIENT STATED HISTORY:(As transcribed by Technologist)  Patient with unsteady gait.        CONTRAST USED:  14 mL of Dotarem       FINDINGS:        MRI BRAIN   The ventricles and sulci are normal in size for the patient's age with mild atrophy present not uncommon for the age of 81.  No mass-effect,  midline shift, hydrocephalus, herniation, extra-axial fluid collections, or signs of acute territorial infarct,   or brain tumor.       No abnormality of midline structures. No sign of restricted diffusion. No pathologic gradient susceptibility pattern.       Flow voids are present within the internal carotid and basilar arteries. No sign of acute fluid in the paranasal sinuses.       With contrast infusion, there is no abnormal enhancement pattern identified.       MRA HEAD AND NECK       Patency of the carotid and vertebral arterial structures of the neck, with no signs of occlusion, high-grade stenosis, acute thrombosis, acute dissection involving the common carotid, internal carotid, external carotid, vertebral arteries bilaterally.     Both vertebral arteries join forming basilar artery.  The basilar artery is patent.       Intracranial circulation demonstrates patency of the intracranial ICA bilaterally, the right and left anterior, middle, and right posterior cerebral arteries without evidence for high-grade stenosis, occlusion, or other acute abnormality.  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  No signs of occlusion.  No restricted diffusion or other features for acute infarct.         Dictated by (CST): Gio Trammell MD on 6/20/2021 at 4:36 PM       Finalized by (CST): Gio Trammell MD on 6/20/2021 at 4:45 PM       PROCEDURE:  XR CHEST AP PORTABLE  (CPT=71045)       TECHNIQUE:  AP chest radiograph was obtained.       COMPARISON:  [facility], CHEST AP PORT, 3/26/2011, 10:00 AM.       INDICATIONS:  dizziness and feeling unsteady on feet with nausea starting at 4am, vomited a couple hours ago       PATIENT STATED HISTORY: (As transcribed by Technologist)  Patient stated having symptoms of dizziness.             FINDINGS:  The heart is borderline in size.  The lungs are clear of acute-appearing disease process.  The costophrenic angles are sharp.  There is no active disease seen on the basis of portable radiography.                        Impression   CONCLUSION:  Borderline heart size. No active disease seen.           Dictated by (CST): Gio Trammell MD on 6/20/2021 at 3:46 PM       Finalized by (CST): Gio Trammell MD on 6/20/2021 at 3:47 PM      DATE OF SERVICE: 04.22.2021     LEFT FOOT  AP, LATERAL AND OBLIQUE (3 VIEWS)   CLINICAL INFORMATION:  Left foot pain.   COMPARISON STUDY: Left foot, 12/6/2020.   FINDINGS:   No acute fracture or dislocation is seen.   Diffuse osteopenia is present.   The joint spaces are essentially maintained.   The lateral view shows a small inferior calcaneal exostosis (plantar calcaneal spur)    DATE OF SERVICE: 01.28.2021     XR HAND (MIN 3 VIEWS), LEFT (CPT=73130), XR HAND (MIN 3 VIEWS), RIGHT (CPT=73130), XR WRIST COMPLETE   (MIN 3 VIEWS), LEFT (CPT=73110), XR WRIST COMPLETE (MIN 3 VIEWS), RIGHT (CPT=73110)   CLINICAL INDICATION: Bilateral hand swelling. Elevated sedimentation rate and CRP. History of   psoriasis.   COMPARISON STUDIES: None available.   FINDINGS:   Right hand: There is no evidence of acute fracture or dislocation. The MCP joints and   interphalangeal joints of the hand are preserved. No degenerative osteophytes or discrete osseous   erosions are seen at these joints.   Left hand: There is no evidence of acute fracture or dislocation. The MCP joints and interphalangeal   joints of the hand are preserved. No degenerative osteophytes or discrete osseous erosions are seen   at these joints.   Right wrist: There is no evidence of acute fracture or dislocation. There is mild narrowing of the   thumb CMC joint, with mild marginal osteophyte formation. All other joint spaces of the wrist are   preserved. No discrete osseous erosions are identified.   Left wrist: There is no  evidence of acute fracture or dislocation. There is mild narrowing of the   thumb CMC joint, with mild marginal osteophyte formation. All other joint spaces of the wrist are   preserved. No discrete osseous erosions are identified.   Impression   IMPRESSION:   1. 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 Otherwise, no significant arthritic   changes are seen in the bilateral hands and wrists.     DATE OF SERVICE: 01.01.2021     CT CHEST   CLINICAL INDICATION: Follow-up abnormal chest CT   COMPARISON: Chest CT 5/1/2020   TECHNIQUE: 1.25mm thick axial images were obtained through the chest. Coronal reconstructions were   included.   Automated exposure control and ALARA manual techniques for patient specific dose reduction were   followed while maintaining the necessary diagnostic image quality.   FINDINGS:     Lungs and large airways: A nodular density previously seen in the superior segment of the right   lower lobe is no longer visible. Multiple additional scattered small 1 to 2 mm nodular densities are   seen. A groundglass attenuation density measuring approximately 5 mm on image 55 in the right upper   lobe is stable. A 3 mm nodule in the right upper lobe on image 69 looks new, and may be a mucous   filled bronchus. A subpleural 3 mm nodular density in the left lower lobe on image 168 is stable.   There is platelike atelectasis or scarring in the lingula which is slightly more pronounced than   previously. Diffuse underlying emphysematous changes are present. There are platelike areas of   scarring or atelectasis in both lung bases. Biapical pleural parenchymal scarring is present.   Pleura:  Normal. No pleural effusion or thickening.   Heart and pericardium:  There are calcifications in the plane of the aortic valve, suggestive of   calcific aortic stenosis..   Mediastinum and dayan:  Normal.   Chest wall and lower neck:  Normal.   Vessels:  Atherosclerotic changes in the aorta and coronary arteries.    Bones:  Multilevel degenerative changes are present in the spine. Postoperative changes are noted in   the left hemithorax.   Upper abdomen: There is a small hiatal hernia.   Impression   IMPRESSION:   1. Emphysema.   2. Multiple small nodular densities in the lungs measuring up to 3 mm as described above.   3. Atherosclerosis and possible calcific aortic stenosis     Labs:  Lab Results   Component Value Date    WBC 7.2 06/11/2024    RBC 4.71 06/11/2024    HGB 15.4 06/11/2024    HCT 44.4 06/11/2024    .0 06/11/2024    MPV 9.9 03/26/2011    MCV 94.3 06/11/2024    MCH 32.7 06/11/2024    MCHC 34.7 06/11/2024    RDW 12.9 06/11/2024    NEPRELIM 4.26 06/11/2024    NEPERCENT 59.0 06/11/2024    LYPERCENT 15.1 06/11/2024    MOPERCENT 12.3 06/11/2024    EOPERCENT 13.0 06/11/2024    BAPERCENT 0.3 06/11/2024    NE 4.26 06/11/2024    LYMABS 1.09 06/11/2024    MOABSO 0.89 06/11/2024    EOABSO 0.94 (H) 06/11/2024    BAABSO 0.02 06/11/2024     Lab Results   Component Value Date    GLU 98 06/11/2024    BUN 11 06/11/2024    BUNCREA 17.1 12/02/2022    CREATSERUM 0.97 06/11/2024    ANIONGAP 5 06/11/2024    GFR >59 04/23/2010    GFRNAA 64 01/13/2022    GFRAA 74 01/13/2022    CA 9.1 06/11/2024    OSMOCALC 281 06/11/2024    ALKPHO 70 06/11/2024    AST 13 (L) 06/11/2024    ALT 22 06/11/2024    BILT 0.8 06/11/2024    TP 6.6 06/11/2024    ALB 3.4 06/11/2024    GLOBULIN 3.2 06/11/2024    AGRATIO 2.3 12/08/2014     06/11/2024    K 4.1 06/11/2024     06/11/2024    CO2 28.0 06/11/2024       Additional Labs:  02/2024  ESR 18 normal  CRP normal  Vitamin D 64.1 normal  CMP grossly normal  CBC grossly normal  B12 503 normal  B6 normal  B1 normal  Phosphorus 2.7 normal  Mag 2.2 normal  QuantiFERON-TB negative    12/2022   Mag 2.1 normal  B12 677 normal   Vit D 65.6 normal   Tsh normal    ESR 5 normal  CRP normal   Uric acid 5.0     01/2022  SOO 1: 160 AMA  SOO 1: 160 speckled  dsDNA, Smith, histone negative  Remainder of EDMOND  panel negative    05/2021  ESR 11  CRP normal     03/2021  ESR 9  CRP normal     01/2021  SOO 1:80  ESR 49 elevated  CRP 2.23 elevated  TB negative     12/2020  Uric acid 4.5  CCP neg  RF neg  SSA neg  SSB neg  ESR 36   CRP 1.94 elevated    Leslee Hernandez DO  [facility]  06/11/2024

## 2024-06-12 ENCOUNTER — APPOINTMENT (OUTPATIENT)
Dept: REHABILITATION | Age: 84
End: 2024-06-12
Attending: INTERNAL MEDICINE

## 2024-06-12 ENCOUNTER — HOSPITAL ENCOUNTER (OUTPATIENT)
Dept: GENERAL RADIOLOGY | Age: 84
Discharge: HOME | End: 2024-06-12
Attending: INTERNAL MEDICINE

## 2024-06-12 DIAGNOSIS — J69.0 ASPIRATION PNEUMONIA  (CMD): ICD-10-CM

## 2024-06-12 PROBLEM — D84.821 IMMUNOCOMPROMISED STATE DUE TO DRUG THERAPY (HCC): Status: ACTIVE | Noted: 2024-06-12

## 2024-06-12 PROBLEM — R53.82 CHRONIC FATIGUE: Status: ACTIVE | Noted: 2024-06-12

## 2024-06-12 PROBLEM — Z79.899 IMMUNOCOMPROMISED STATE DUE TO DRUG THERAPY (HCC): Status: ACTIVE | Noted: 2024-06-12

## 2024-06-12 PROBLEM — R05.8 PRODUCTIVE COUGH: Status: ACTIVE | Noted: 2024-06-12

## 2024-06-12 PROBLEM — R06.02 SHORTNESS OF BREATH: Status: ACTIVE | Noted: 2024-06-12

## 2024-06-12 PROBLEM — L40.0 PLAQUE PSORIASIS: Status: ACTIVE | Noted: 2021-08-30

## 2024-06-12 PROBLEM — R63.4 WEIGHT LOSS, UNINTENTIONAL: Status: ACTIVE | Noted: 2024-06-12

## 2024-06-12 PROBLEM — M50.30 DDD (DEGENERATIVE DISC DISEASE), CERVICAL: Status: ACTIVE | Noted: 2024-06-12

## 2024-06-12 PROCEDURE — 74230 X-RAY XM SWLNG FUNCJ C+: CPT

## 2024-06-12 PROCEDURE — 92611 MOTION FLUOROSCOPY/SWALLOW: CPT

## 2024-07-12 ENCOUNTER — TELEPHONE (OUTPATIENT)
Facility: CLINIC | Age: 84
End: 2024-07-12

## 2024-07-12 NOTE — TELEPHONE ENCOUNTER
At the request of Dr. Hernandez we left 2 messages to offer a pulmonary/sleep consultation with Dr. Dionisio Barbosa. We will await patient's return call.

## 2024-07-17 DIAGNOSIS — R13.12 OROPHARYNGEAL DYSPHAGIA: Primary | ICD-10-CM

## 2024-07-18 ENCOUNTER — HOSPITAL ENCOUNTER (OUTPATIENT)
Dept: REHABILITATION | Age: 84
Discharge: STILL ADMITTED OR ADMINISTRATIVE | End: 2024-07-18
Attending: INTERNAL MEDICINE

## 2024-07-18 PROCEDURE — 92610 EVALUATE SWALLOWING FUNCTION: CPT

## 2024-08-06 ENCOUNTER — TELEPHONE (OUTPATIENT)
Dept: RHEUMATOLOGY | Facility: CLINIC | Age: 84
End: 2024-08-06

## 2024-08-06 DIAGNOSIS — L40.50 PSORIATIC ARTHRITIS (HCC): Primary | ICD-10-CM

## 2024-08-06 DIAGNOSIS — L40.0 PLAQUE PSORIASIS: ICD-10-CM

## 2024-08-06 RX ORDER — SECUKINUMAB 150 MG/ML
150 INJECTION SUBCUTANEOUS
Qty: 1 EACH | Refills: 2 | Status: SHIPPED | OUTPATIENT
Start: 2024-08-06 | End: 2024-09-03

## 2024-08-06 NOTE — TELEPHONE ENCOUNTER
Pt phoned office, pt is requesting the Cosentyx 150mg monthly. Pt no longer feels he requires the 300mg dosing, feels his skin and joint pain has improved.     Pt states he has recovered from pneumonia which required 3 separate antibiotics. Explained to pt that he was suppose to hold Cosentyx with active infection. Pt voices understanding.       LOV: 6/11/24  Future Appointments   Date Time Provider Department Center   10/11/2024  1:00 PM Leslee Hernandez DO [facility]   1/15/2025 11:45 AM Leslee Hernandez DO [facility]   LABS:  Component      Latest Ref Rng 6/11/2024   WBC      4.0 - 11.0 x10(3) uL 7.2    RBC      3.80 - 5.80 x10(6)uL 4.71    Hemoglobin      13.0 - 17.5 g/dL 15.4    Hematocrit      39.0 - 53.0 % 44.4    Platelet Count      150.0 - 450.0 10(3)uL 203.0    MCV      80.0 - 100.0 fL 94.3    MCH      26.0 - 34.0 pg 32.7    MCHC      31.0 - 37.0 g/dL 34.7    RDW      % 12.9    Prelim Neutrophil Abs      1.50 - 7.70 x10 (3) uL 4.26    Neutrophils Absolute      1.50 - 7.70 x10(3) uL 4.26    Lymphocytes Absolute      1.00 - 4.00 x10(3) uL 1.09    Monocytes Absolute      0.10 - 1.00 x10(3) uL 0.89    Eosinophils Absolute      0.00 - 0.70 x10(3) uL 0.94 (H)    Basophils Absolute      0.00 - 0.20 x10(3) uL 0.02    Immature Granulocyte Absolute      0.00 - 1.00 x10(3) uL 0.02    Neutrophils %      % 59.0    Lymphocytes %      % 15.1    Monocytes %      % 12.3    Eosinophils %      % 13.0    Basophils %      % 0.3    Immature Granulocyte %      % 0.3    Glucose      70 - 99 mg/dL 98    Sodium      136 - 145 mmol/L 136    Potassium      3.5 - 5.1 mmol/L 4.1    Chloride      98 - 112 mmol/L 103    Carbon Dioxide, Total      21.0 - 32.0 mmol/L 28.0    ANION GAP      0 - 18 mmol/L 5    BUN      9 - 23 mg/dL 11    CREATININE      0.70 - 1.30 mg/dL 0.97    CALCIUM      8.5 - 10.1 mg/dL 9.1    CALCULATED OSMOLALITY      275 - 295 mOsm/kg 281    EGFR      >=60 mL/min/1.73m2 77    AST (SGOT)      15  - 37 U/L 13 (L)    ALT (SGPT)      16 - 61 U/L 22    ALKALINE PHOSPHATASE      45 - 117 U/L 70    Total Bilirubin      0.1 - 2.0 mg/dL 0.8    PROTEIN, TOTAL      6.4 - 8.2 g/dL 6.6    Albumin      3.4 - 5.0 g/dL 3.4    Globulin      2.8 - 4.4 g/dL 3.2    A/G Ratio      1.0 - 2.0  1.1    Patient Fasting for CMP? No    PROCALCITONIN      <0.16 ng/mL <0.05       Legend:  (H) High  (L) Low

## 2024-08-13 ENCOUNTER — TELEPHONE (OUTPATIENT)
Dept: RHEUMATOLOGY | Facility: CLINIC | Age: 84
End: 2024-08-13

## 2024-08-13 NOTE — TELEPHONE ENCOUNTER
Pt called office, requesting we call [facility] to clarify new prescription for Cosentyx.    Phoned [facility] at [phone] to clarify prescription changed to Cosentyx 150mg every 30 days. Voices understanding. Pt will be notified of their delivery.

## 2024-08-14 ENCOUNTER — HOSPITAL ENCOUNTER (OUTPATIENT)
Dept: REHABILITATION | Age: 84
Discharge: STILL ADMITTED OR ADMINISTRATIVE | End: 2024-08-14

## 2024-08-20 ENCOUNTER — APPOINTMENT (OUTPATIENT)
Dept: REHABILITATION | Age: 84
End: 2024-08-20

## 2024-08-21 ENCOUNTER — APPOINTMENT (OUTPATIENT)
Dept: REHABILITATION | Age: 84
End: 2024-08-21

## 2024-08-28 ENCOUNTER — HOSPITAL ENCOUNTER (OUTPATIENT)
Dept: REHABILITATION | Age: 84
Discharge: STILL ADMITTED OR ADMINISTRATIVE | End: 2024-08-28

## 2024-08-28 PROCEDURE — 92526 ORAL FUNCTION THERAPY: CPT

## 2024-10-11 ENCOUNTER — OFFICE VISIT (OUTPATIENT)
Dept: RHEUMATOLOGY | Facility: CLINIC | Age: 84
End: 2024-10-11
Payer: MEDICARE

## 2024-10-11 VITALS
BODY MASS INDEX: 21.21 KG/M2 | TEMPERATURE: 97 F | RESPIRATION RATE: 16 BRPM | OXYGEN SATURATION: 97 % | HEART RATE: 80 BPM | HEIGHT: 66 IN | DIASTOLIC BLOOD PRESSURE: 60 MMHG | SYSTOLIC BLOOD PRESSURE: 124 MMHG | WEIGHT: 132 LBS

## 2024-10-11 DIAGNOSIS — E55.9 VITAMIN D DEFICIENCY: ICD-10-CM

## 2024-10-11 DIAGNOSIS — M50.30 DDD (DEGENERATIVE DISC DISEASE), CERVICAL: ICD-10-CM

## 2024-10-11 DIAGNOSIS — R53.82 CHRONIC FATIGUE: ICD-10-CM

## 2024-10-11 DIAGNOSIS — R91.8 GROUND GLASS OPACITY PRESENT ON IMAGING OF LUNG: ICD-10-CM

## 2024-10-11 DIAGNOSIS — R13.10 DYSPHAGIA, UNSPECIFIED TYPE: ICD-10-CM

## 2024-10-11 DIAGNOSIS — D84.821 IMMUNOCOMPROMISED STATE DUE TO DRUG THERAPY (HCC): ICD-10-CM

## 2024-10-11 DIAGNOSIS — Z79.899 IMMUNOCOMPROMISED STATE DUE TO DRUG THERAPY (HCC): ICD-10-CM

## 2024-10-11 DIAGNOSIS — L40.50 PSORIATIC ARTHRITIS (HCC): Primary | ICD-10-CM

## 2024-10-11 DIAGNOSIS — L40.0 PLAQUE PSORIASIS: ICD-10-CM

## 2024-10-11 PROCEDURE — G2211 COMPLEX E/M VISIT ADD ON: HCPCS | Performed by: INTERNAL MEDICINE

## 2024-10-11 PROCEDURE — 99215 OFFICE O/P EST HI 40 MIN: CPT | Performed by: INTERNAL MEDICINE

## 2024-10-11 RX ORDER — TRAZODONE HYDROCHLORIDE 50 MG/1
TABLET, FILM COATED ORAL NIGHTLY
COMMUNITY
Start: 2024-08-12

## 2024-10-11 RX ORDER — SECUKINUMAB 150 MG/ML
150 INJECTION SUBCUTANEOUS
COMMUNITY

## 2024-10-11 NOTE — PROGRESS NOTES
RHEUMATOLOGY FOLLOW UP   Date of visit: 10/11/2024  ?  Chief Complaint   Patient presents with    Psoriatic Arthritis     4 month f/u. Feeling ok. Joint pain is ok. Still getting multiple psoriatic spots that respond to steroid cream, but comes back. No falls or fractures.Converted rapid score of 2.7       ASSESSMENT, DISCUSSION & PLAN   Assessment:  1. Psoriatic arthritis (HCC)    2. Plaque psoriasis    3. DDD (degenerative disc disease), cervical    4. Immunocompromised state due to drug therapy (HCC)    5. Chronic fatigue    6. Dysphagia, unspecified type    7. Vitamin D deficiency    8. Ground glass opacity present on imaging of lung          Discussion:  Mr. Mohamud Banegas is a 85 yo man who initially presented for second opinoin regarding his joint pain and skin. States he has had a long standing history of psoriasis for over 40 years but was fairly mild until recently. He was diagnosed with seronegative RA in 2019 and started on plaquenil and prednisone with some relief of symptoms. Starting this spring/summer, he has had significant worsened skin rashes. He was evaluated by dermatology who dx pt with psoriasis. Tried otezla which seemed to work initially, however had continued skin rash. He presents for second opinion. At his initial visit, he had minimal synovitis of the joints of the hands, no signs of tenosynovitis, positive nail pitting of fingernails diffusely with some onycholysis. He does have left sided SI joint tenderness.     Since that time, he was started on cosentyx and doing well. He has tolerated so far without obvious side effects.   At a prior visit, he felt his skin worsened so we increased dosage to 300mg every 4 weeks instead of the 150mg. He continues to report active skin disease (prior exams showed dry skin but no active psoriasis). He denies joint pain or swelling like previously.   His main complaint revolves around neck/lower back  Recommended he start formal PT since he admits  to not doing an HEP as previously discussed.  He will consider pain management again, although he has been seen before and does not seem interested in injections. Okay to apply heat to the area and consider using TENS unit on the lower back.   Pt has significant DDD of the cervical spine with mild spinal stenosis but moderate to severe neural foraminal stenosis in various areas. Discussed previously that these findings may be contributing to his weakness.     Given his improvement of symptoms overall, he will continue with decreased cosentyx at 150mg every 4 weeks. If concern for infection, okay to take every 5-6 weeks and take tylenol as needed for pain in the weeks leading up to the injection.  Given his worsened cough and swallow issues, will repeat MRI of the cervical spine to see if the stenosis is contributing. Was told last year by one surgeon to do immediate surgery and another surgeon (per pt) advised against surgery.   Encouraged pt to increase walking and stretching due to generalized weakness    Okay to continue topicals prn for the skin  Continues following with pulmonology and other specialists regarding the cough.   Will add autoimmune testing due to GGO on last CT. Pt was previously offered bronchoscopy but declined.     Follow up in 3 months or sooner as needed    Patient and pt's wife verbalized understanding of above instructions. No further questions at this time.    Code selection for this visit was based on time spent (42min) on date of service in preparing to see the patient, obtaining and/or reviewing separately obtained history, performing a medically appropriate examination, counseling and educating the patient/family/caregiver, ordering medications or testing, referring and communicating with other healthcare providers, documenting clinical information in the E HR, independently interpreting results and communicating results to the patient/family/caregiver and care coordination with the  patient's other providers.    ?  Plan:  Diagnoses and all orders for this visit:    Psoriatic arthritis (HCC)  -     MRI SPINE CERVICAL (CPT=72141); Future  -     C-Reactive Protein; Future  -     Sed Rate, Westergren (Automated); Future  -     Complement C3, Serum; Future  -     Complement C4, Serum; Future  -     Anti-Nuclear Antibody (SOO) by IFA, Reflex Titer + Specific Antibodies; Future  -     Myositis Antibody Comprehensive Panel [E]; Future  -     Magnesium; Future  -     Vitamin D; Future    Plaque psoriasis  -     C-Reactive Protein; Future  -     Sed Rate, Westergren (Automated); Future  -     Complement C3, Serum; Future  -     Complement C4, Serum; Future  -     Anti-Nuclear Antibody (SOO) by IFA, Reflex Titer + Specific Antibodies; Future  -     Myositis Antibody Comprehensive Panel [E]; Future  -     Magnesium; Future  -     Vitamin D; Future    DDD (degenerative disc disease), cervical  -     MRI SPINE CERVICAL (CPT=72141); Future    Immunocompromised state due to drug therapy (HCC)  -     MRI SPINE CERVICAL (CPT=72141); Future  -     C-Reactive Protein; Future  -     Sed Rate, Westergren (Automated); Future  -     Complement C3, Serum; Future  -     Complement C4, Serum; Future  -     Anti-Nuclear Antibody (SOO) by IFA, Reflex Titer + Specific Antibodies; Future  -     Myositis Antibody Comprehensive Panel [E]; Future  -     Magnesium; Future  -     Vitamin D; Future    Chronic fatigue  -     MRI SPINE CERVICAL (CPT=72141); Future  -     C-Reactive Protein; Future  -     Sed Rate, Westergren (Automated); Future  -     Complement C3, Serum; Future  -     Complement C4, Serum; Future  -     Anti-Nuclear Antibody (SOO) by IFA, Reflex Titer + Specific Antibodies; Future  -     Myositis Antibody Comprehensive Panel [E]; Future  -     Magnesium; Future  -     Vitamin D; Future    Dysphagia, unspecified type  -     MRI SPINE CERVICAL (CPT=72141); Future    Vitamin D deficiency  -     Magnesium; Future  -      Vitamin D; Future    Ground glass opacity present on imaging of lung  -     C-Reactive Protein; Future  -     Sed Rate, Westergren (Automated); Future  -     Complement C3, Serum; Future  -     Complement C4, Serum; Future  -     Anti-Nuclear Antibody (SOO) by IFA, Reflex Titer + Specific Antibodies; Future  -     Myositis Antibody Comprehensive Panel [E]; Future                Return in about 3 months (around 1/11/2025).  ?  HPI   Mohamud Banegas is a 84 year old male with the following active problems who was seen initially for second opinion regarding his psoriasis/psoriatic arthritis. He was previously on Otezla and is now on Cosentyx. He presents for follow up today.      Since his last visit, he has been okay.  Has been getting some persistent areas of psoriasis which he uses topicals as needed. Felt wasn't present with the 300mg of cosentyx but thought the increased dose was too immunocompromising.     Still with significant cough. Given various abx and steroids. No improvement of cough/symptoms.   Had swallow study- recommended to see a different pulmonology but has been waiting to get in. No signs of aspiration. But seemed like some delay/slow motion with swallowing.   Does have hx of COPD/emphysema  Did feel better when delaying the cosentyx injection but the cough is back. Needing to use nebulizer at night which helps a bit.   Seen at [facility]- had scope done which did not show aspiration. Told \"something sticking out of trachea\" but not likely contributing.     Was seen by spine surgeon previously- told he would've recommended surgery. This was after the fall last year.   CT neck in 07/2023- showed hematoma in the left paraspinal muscle and fracture at tip of C4 spinous process  MRI cervical in 10/2023-  showed multiple things including \"C5-6 facet joint diastases with interspinous ligamentous injury and multilevel cervical spondylosis with spinal stenosis. Advanced stenosis involving  foraminal levels c6-7 and to lesser degree c3-c4 through c5-6. There is mild multilevel central canal stenosis involving c3-4 through c5-6.\"  2nd opinion spine surgeon did not recommend surgery (prior)    Seen by ENT a few weeks ago- some submandibular swelling- told no change in management.     Still with significant balance issues.  Not walking much- only about 2500k steps  Has gone to PT in the past but hasn't been compliant   Not sleeping as much (previously thought sleeping too much). Difficulty concentrating at times. And still with low energy/fatigue    Previous wt lost about 10# and now has been stable at around 130s.   Appetite improved. No current nausea or abd pain. Denies abdominal pain/bloating or diarrhea or blood in stools.     Denies significant joint pain, states manageable. Can happen in the knees. Denies overt swelling of the joints.   Denies significant morning stiffness. Some change in the toes but not severe. No finger issues today. Some recurrence of dupuytren's contracture.     Denies any further falls/fractures. Continues K2 supplementation. Feels less film over the tongue like before. Also not brushing as often.      HPI from initial consultation  referred for rheumatologic evaluation due to second opinion regarding psoriasis/psoriatic arthritis.     Has suffered from plaque psoriasis for several years (over 40 years). Initially was affecting the hands and fingernail and over time affected other areas diffusely.    Then around 2019, he he suffered a wound on the left leg, states he had swelling in the foot/leg. Had DVT ruled out and eventually was diagnosed with arthritis by his PCP. Was placed on low dose prednisone with no relief, then when placed on 20mg had improvement with higher dose.   Had pain/swelling in the left foot, then the right foot then both hands and wrists. Was so difficult he couldn't even turn a key.   Was evaluated by another rheumatologist and was diagnosed with  seronegative rheumatoid arthritis. Had recommended methotrexate but pt did research and did not want to take medication. Was placed on hydroxychloroquine in March of 2020. Has been taking since that time. States seemed like the medication helped with most of the joint pain with exception of the feet being swollen.  Swelling does improve with elevating the legs.   Had difficulty walking up/down the stairs due to the \"arthritis\" pt just states different areas bothering him.  Had significant flare of psoriasis over the summer and was referred to dermatologist. Was given different topicals  Was given samples of Otezla per pt preference. Started on starter pack but was not able to get approved through insurance. After completing sample pack, he was able to get generic from [place] but still having skin flaring while on the generic.  Was seen by dermatology- Dr. Mccann again, and started prednisone (as high as 40mg) and did another course of steroids. Had biopsy of a few lesions and is waiting to find pathology.   Has lesions over belly, chest, back, arms, and legs. States very itchy.     Denies current joint pain, but taking prednisone currently. States was having some wrist and hand pain when off the steroids.   + fingernail changes from psoriasis  + middle finger PIPs have ramsey swollen  + chronic contracture of the left pinky - did have surgery but had recurrence.     + morning stiffness and difficult walking, lasted throughout the day when severe, but much better when on otezla alone   + altered gum sensation and some pain  + hx of problem with his eye, inflamed per pt. Saw Dr. Swann, required steroid eye drops and topical abx (a few months ago)  + hx of pancreatitis- thought related to pancreatic divisum; and elevated cholesterol   + constipation while on plaquenil resolved since that time  + some left sided low back pain, typically at night and can be difficult to get into a comfortable position  + difficulty  falling asleep at night- take ativan; was given mirtazapine but isn't helping. Not necessarily due to pain itself.   + dry mouth throughout the day, improved with prednisone, also drinking a lot of water       Hx of SVT s/p ablation  Some difficulty swallowing typically when laying hardik  Hx of KI   Hx of diverticulosis without hx of diverticulitis       The patient denies oral or nasal ulcers, Raynaud's phenomenon, prior hematologic abnormality, prior renal or liver disease, or history of seizures.  No history of prior blood clot in the legs or lungs, strokes or ischemic phenomenon.  Denies nonhealing ulcers on the fingertips.  The patient denies any crampy abdominal pain, diarrhea, bloody stools, nodular painful shin bruises, Achilles heel pain or symptoms of enthesitis.  There are no symptoms of severe dry eyes.  No fevers, chills, lymphadenopathy, unexpected weight loss,      Past Medical History:  Past Medical History:    Allergic rhinitis    COPD    COPD (chronic obstructive pulmonary disease) (HCC)    Empyema    GERD    GERD (gastroesophageal reflux disease)    Heart disease    High cholesterol    OTHER DISEASES    PVD    Pancreas divisum    Pancreatitis x 1 in 5/10    Rotator cuff tear    RIGHT SHOULDER    Seronegative rheumatoid arthritis (HCC)    SLEEP APNEA    AHI 15, REM 25, amirah 88%    Spermatocele     Past Surgical History:  Past Surgical History:   Procedure Laterality Date    Colonoscopy  > 10 years ago    Ndsc ablation & rcnstj [facility] w/o bypass      Repair ing hernia,5+y/o,reducibl  01/01/1952     Family History:  Family History   Problem Relation Age of Onset    Other (Other) Father         COPD, CKD    Cancer Mother         breast     Social History:  Social History     Socioeconomic History    Marital status:    Occupational History    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   Tobacco Use    Smoking status: Former     Current packs/day: 0.00     Average packs/day: 1 pack/day for 63.7 years (63.7  ttl pk-yrs)     Types: Cigarettes     Start date: 1955     Quit date: 2018     Years since quittin.1    Smokeless tobacco: Never   Vaping Use    Vaping status: Never Used   Substance and Sexual Activity    Alcohol use: Not Currently     Alcohol/week: 0.0 standard drinks of alcohol     Comment: very rare    Drug use: No   Other Topics Concern    Occupational Exposure Yes     Comment: steel mill, [employer] steel dust    Exercise Yes     Comment: walks about 1 mile at least 2 -3 times a week     Medications:  Outpatient Medications Marked as Taking for the 10/11/24 encounter (Office Visit) with Leslee Hernandez,    Medication Sig Dispense Refill    traZODone 50 MG Oral Tab Take 0.5-1 tablets (25-50 mg total) by mouth nightly.      Secukinumab (COSENTYX SENSOREADY PEN) 150 MG/ML Subcutaneous Solution Auto-injector Inject 150 mg into the skin Every 1 (one) months.      ipratropium-albuterol 0.5-2.5 (3) MG/3ML Inhalation Solution Inhale 3 mL into the lungs 4 (four) times daily as needed.      Menaquinone-7 (VITAMIN K2) 100 MCG Oral Cap Take 1 capsule by mouth daily.      clobetasol 0.05 % External Cream Apply to affected area BID for a week then wait at least a week to apply again 60 g 2    rosuvastatin 5 MG Oral Tab 0.5 tablets (2.5 mg total).      VALSARTAN 80 MG Oral Tab Take 1 tablet (80 mg total) by mouth daily.      mupirocin 2 % External Ointment Apply 1 Application topically 3 (three) times daily.      Fluticasone Furoate (FLONASE SENSIMIST) 27.5 MCG/SPRAY Nasal Suspension 2 sprays by Nasal route daily.      omega-3 fatty acids 1000 MG Oral Cap Take 1,000 mg by mouth daily.      triamcinolone 0.1 % External Cream       fluticasone-umeclidin-vilant (TRELEGY ELLIPTA) 100-62.5-25 MCG/INH Inhalation Aerosol Powder, Breath Activated Inhale 1 puff into the lungs daily. 3 each 3    ZINC OR Take 50 mg by mouth.      Montelukast Sodium (SINGULAIR) 10 MG Oral Tab Take 1 tablet (10 mg total) by mouth daily. 90  tablet 3    Coenzyme Q10 (COQ10) 100 MG Oral Cap Take 30 mg by mouth daily.      Albuterol Sulfate HFA (PROAIR HFA) 108 (90 BASE) MCG/ACT Inhalation Aero Soln Inhale 2 puffs into the lungs every 6 (six) hours as needed for Wheezing. 1 Inhaler 0    magnesium 250 MG Oral Tab Take 100 mg by mouth as needed.        VITAMIN D 1000 UNIT OR TABS Take 4,000 Units by mouth.         Modified Medications    No medications on file     Medications Discontinued During This Encounter   Medication Reason    Black Pepper-Turmeric (TURMERIC COMPLEX/BLACK PEPPER OR)        ?  ?  Allergies:  Allergies   Allergen Reactions    Aspirin NAUSEA ONLY     HIGHER DOSAGES. LOW DOSE ASPIRIN OK    Bactrim [Sulfamethoxazole W/Trimethoprim] RASH    Motrin Ib NAUSEA ONLY    Other UNKNOWN    Sulfamethoxazole UNKNOWN    Amlodipine RASH    Ibuprofen NAUSEA ONLY     ?  REVIEW OF SYSTEMS   ?  Review of Systems   Constitutional:  Positive for malaise/fatigue and weight loss. Negative for chills and fever.        Gets cold easily   HENT:  Positive for hearing loss. Negative for congestion, nosebleeds and sinus pain.    Eyes:  Negative for pain and redness.   Respiratory:  Positive for cough, sputum production and shortness of breath (hx COPD). Negative for hemoptysis.    Cardiovascular:  Negative for chest pain, palpitations and leg swelling.   Gastrointestinal:  Positive for constipation (stable), heartburn (intermittent) and nausea. Negative for abdominal pain, blood in stool, diarrhea and vomiting.   Genitourinary:  Negative for dysuria, hematuria and urgency.   Musculoskeletal:  Positive for back pain and joint pain. Negative for myalgias.   Skin:  Positive for rash. Negative for itching.   Neurological:  Positive for weakness (generalized). Negative for dizziness, tingling and headaches.   Endo/Heme/Allergies:  Positive for environmental allergies.   Psychiatric/Behavioral:  The patient is nervous/anxious.      PHYSICAL EXAM   Today's  Vitals:  Temperature Blood Pressure Heart Rate Resp Rate SpO2   Temp: 97.4 °F (36.3 °C) BP: 124/60 Pulse: 80 Resp: 16 SpO2: 97 %   ?  Current Weight Height BMI BSA Pain   Wt Readings from Last 1 Encounters:   10/11/24 132 lb (59.9 kg)    Height: 5' 6\" (167.6 cm) Body mass index is 21.31 kg/m². Body surface area is 1.68 meters squared.         Physical Exam  Vitals and nursing note reviewed.   Constitutional:       General: He is not in acute distress.     Appearance: He is well-developed. He is not diaphoretic.      Comments: thin   HENT:      Head:      Comments: Slight scar from recent laceration      Ears:      Comments: + hearing aid  Eyes:      General: No scleral icterus.     Extraocular Movements: Extraocular movements intact.      Conjunctiva/sclera: Conjunctivae normal.   Neck:      Vascular: No JVD.      Trachea: No tracheal deviation.   Cardiovascular:      Rate and Rhythm: Normal rate and regular rhythm.   Pulmonary:      Effort: Pulmonary effort is normal. No respiratory distress.      Breath sounds: No wheezing.      Comments: Expiratory rales/coarse breath sounds diffusely  Musculoskeletal:         General: Deformity present. No swelling or tenderness.      Cervical back: Neck supple.      Comments: B/l cmc enlargement wo synovitis  Left pinky finger post-raumatic  No swelling, tenderness, redness or restriction of motion of the DIPs, pips, MCPs, wrists, elbows, ankles, or joints of the feet.  B/l Chronic contracture of left pinky - appearance of dupuytren's contracture   Bilateral knees without medial joint line tenderness, mild crepitus, no effusion.  Neck flexed and difficulty straightening; thoracic kyphosis - stable   Previously noted- Pes planus; Left lateral mid foot nodule without surrounding synovitis   Lymphadenopathy:      Cervical: No cervical adenopathy.   Skin:     General: Skin is warm and dry.      Coloration: Skin is pale.      Findings: No erythema or rash.      Comments: Significant  psoriaform lesions noted over chest, abdomen, back, upper arms -- resolved  + diffusely nail pitting; scattered onycholysis -- resolved    Neurological:      Mental Status: He is alert and oriented to person, place, and time.      Cranial Nerves: No cranial nerve deficit.      Gait: Gait abnormal.   Psychiatric:         Mood and Affect: Mood normal.         Behavior: Behavior normal.       ?  Radiology review:       DATE OF SERVICE: 06.04.2024  CT CHEST    CLINICAL INFORMATION: Cough, unspecified type    COMPARISON: CT chest 6/20/2023.    TECHNIQUE:  Routine helical scanning was performed through the chest without contrast.    Automated exposure control and ALARA manual techniques for patient specific dose reduction were  followed while maintaining the necessary diagnostic image quality.    FINDINGS:  Evaluation of the mediastinal and vascular structures is suboptimal without IV contrast.    SUPPORT DEVICES:  None.  THORACIC INLET:Normal.  HEART: Coronary artery atherosclerosis..  AORTA:Aortic atherosclerosis.  PULMONARY ARTERIES:Normal.  MEDIASTINUM/MEME:  Normal.  ESOPHAGUS:Normal.  CHEST WALL:  Normal.  AXILLA:Normal.  MUSCULOSKELETAL:  Degenerative changes of the spine.  UPPER ABDOMEN: Normal.  TRACHEA:Normal.  LUNGS/PLEURA:  Moderate panlobular emphysema.    Lower lobe bronchial wall thickening and mucous plugging with associated interstitial and  groundglass opacities. Similar findings are also seen at the lingula.    Scattered 3 mm pulmonary micronodules (series 3 image 50, 75, 167) are stable.  Previously noted interval left upper lobe 3.5 mm nodule has resolved.    =====  IMPRESSION:    1.  Lower lobe and lingular bronchial wall thickening, mucous plugging with associated opacities.  These are new from 6/28/2023 and suggest an infectious or inflammatory bronchitis.  2.  Stable pulmonary micronodules.    Clinicians: If you have any questions or concerns regarding the study or report, contact  the  interpreting radiologist Haris Ruffin MD directly at extension [phone].  Exam End: 06/04/24  7:11 PM    Specimen Collected: 06/05/24  9:24 AM Last Resulted: 06/05/24  9:33 AM     DATE OF SERVICE: 06.04.2024   CLINICAL INDICATION: 84 years-old Male with  Cough, unspecified type.     TECHNIQUE: Axial paranasal sinus acquisition, with sagittal and coronal reformations.   Contrast: Noncontrast.   Dose reduction CT scan done according to ALARA (As Low as Reasonably Achievable) or ALARA/IMAGE   GENTLY.     COMPARISON: CT facial bones, 7/202019.     FINDINGS:   Nasal cavity (turbinates, septum): NO nasal cavity masses or large polyps are identified. There is   moderate bilateral turbinate hypertrophy and mucosal thickening with bilateral large middle   turbinate samuel bullosa narrowing the central nasal passageways. The bilateral middle turbinates   show paradoxical morphology.   Septum: There is mild nasal septal bowing to the LEFT with a small spur contacting the LEFT inferior   turbinate. Additional mild 3 mm spurring along the posterior aspect of the RIGHT nasal septum is   noted.     SINUSES AND DRAINAGE PATHWAYS:   RIGHT:   Frontal sinus and frontal recess: Mild mucosal thickening at the inferior RIGHT frontal sinus   narrowing the frontoethmoidal recess.   Maxillary sinus: Clear. There is a patent accessory maxillary ostium.   Ethmoid sinuses: Clear.   Ostiomeatal complex: Patent   Sphenoid sinus: Clear.  Sphenoethmoidal recess: Clear.     LEFT:   Frontal sinus and frontal recess: Clear.   Maxillary sinus: Clear. There are healed LEFT maxillary sinus and zygoma fractures. There is a   patent accessory maxillary ostium.   Ethmoid sinuses: Clear.   Ostiomeatal complex: Patent with a small infraorbital ethmoidal air (Beto) cell.   Sphenoid sinus: Clear.   Sphenoethmoidal recess: Clear.     ANATOMIC VARIATIONS:   Ethmoid roofs: RIGHT Keros II morphology. LEFT Keros II morphology. The olfactory fossae and    cribriform plates are morphologically intact without CT evidence of congenital osseous dehiscence.   Frontal air cells: A RIGHT type IV frontal air cell is present.   Sphenoid sinus: Sellar/post-sellar pneumatization of the sphenoid sinus. Intact appearance of the   osseous sellar floor.Nonpneumatized yanet bandar, clinoid processes, and sphenoid pterygoid   recesses. NO Onodi air cells.   Carotid canals: The osseous coverings of the carotid canals are intact.   Lamina papyracea: Intact bilaterally.   Ethmoidale notches: Anterior ethmoidal notches are protected, abutting the fovea ethmoidalis.   Orbits: There are bilateral lens replacements noted.   Anterior cranial fossa: Intact.   Maxillary teeth: NO CT evidence of odontogenic sinusitis. Intact hard palate.   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   Other findings: The visualized portions of the middle ear cavities and mastoid air cells are clear.   A few punctate calcifications are noted in the LEFT parotid salivary gland.   Nasopharynx/oropharynx: NO nasopharyngeal or tonsillar mass detected.     DATE OF SERVICE: 03.20.2024  CT ABDOMEN+PELVIS(CONTRAST ONLY)(CPT=74177)    CLINICAL INDICATION: Right sided abdominal pain.    COMPARISON STUDY: None available.    TECHNIQUE:  Axial images of the abdomen and pelvis were performed from the lung bases through the  pubic symphysis after the injection of nonionic intravenous contrast.  Oral contrast was not used  for the examination.  80 mL of Isovue 370 were administered intravenously.    Automated exposure control and ALARA manual techniques for patient specific dose reduction were  followed while maintaining the necessary diagnostic image quality.    ADVERSE REACTION: None.    FINDINGS:    LUNG BASES: There are emphysematous changes in the lungs.    LIVER: There are subcentimeter hypodensities too small to further characterize in the liver    GALLBLADDER/BILIARY TREE: Normal.    PANCREAS: Normal.    SPLEEN:  Normal    ADRENAL GLANDS: Normal.    KIDNEYS URETERS AND BLADDER: Normal.    PELVIC ORGANS: Prostate gland is present.    ABDOMINAL AORTA: Abdominal aorta is normal in caliber. There are prominent atherosclerotic  calcifications and plaque in the abdominal aorta and its branch vessels. There is high-grade  stenosis or occlusion of the left common iliac artery.    LYMPH NODES: There is no evidence of mesenteric, retroperitoneal or inguinal adenopathy.    BOWEL: There are diverticula in the colon. There is a moderate to large amount of stool in the  colon. The appendix is normal in caliber. Small bowel loops are normal in caliber.    PERITONEUM: No free fluid.    ABDOMINAL WALL: There is a small right inguinal hernia containing fat. There is protrusion of a  couple small bowel loops towards the hernia. There is no obstruction.    OSSEOUS STRUCTURES: There are degenerative changes in the lumbar spine. There is grade 1  anterolisthesis of L5 on S1 and bilateral spondylolysis at L5.    =====  IMPRESSION:  1. 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 Colonic diverticulosis and moderate to large amount stool in the colon  3. Small right inguinal hernia containing fat with protrusion of a couple small bowel loops towards  the hernia sac without obstruction  Exam End: 03/20/24 11:26 AM    Specimen Collected: 03/20/24 11:49 AM Last Resulted: 03/20/24 12:01 PM     DATE OF SERVICE: 10.01.2023  MRI CERVICAL SPINE    CLINICAL INDICATION: Cervical radiculopathy  upper back pain. Fall July 12, 2023 with C4  spinous process fracture    TECHNIQUE: Multiplanar and multisequence MR imaging was performed through the cervical spine  using the standard MR protocol.    COMPARISON:   Report CT cervical spine 7/12/2023 from [facility]    CONTRAST: None    FINDINGS:    Alignment: Straightening and reversal normal cervical lordosis . Grade 1  C3-C4 and C4-C5  anterolisthesis and C5-C6 retrolisthesis.  Vertebrae: Vertebral body heights are maintained. The known C4 spinous process fracture is less  apparent on MRI than on the previous reported CT. Multilevel Modic 1/2 endplate degenerative changes  otherwise unremarkable marrow signal.  Cervical Cord:   Cerebellar tonsils are appropriately positioned.  Cervical medullary junction is unremarkable.  Cervical cord has maintained caliber and signal.    Discs: Diffuse disc desiccation. There is mild C3-C4 and C4-C5, severe C5-C6 and C6-C7 disc space  narrowing. Multilevel osteophytosis.  Disc levels:  C1-C2: Unremarkable.  Central canal: No stenosis.  C2-C3: Severe left and moderate right facet arthropathy.  Central canal: No stenosis.  Neural foramen:  Mild left and no right foraminal stenosis.  C3-C4: Grade 1 anterolisthesis. Disc osteophyte complex with moderate left and mild to  moderate right facet arthropathy.  Central canal: Mild stenosis  Neural foramen:  Moderate right and severe left foraminal stenosis.  C4-C5: Grade 1 anterolisthesis. Disc osteophyte complex with severe right and mild left facet  arthropathy.  Central canal: Mild stenosis  Neural foramen:  Moderate right greater than left foraminal stenosis.  C5-C6: Disc osteophyte complex with moderate right and mild left facet arthropathy. There is  diastases of the left facet joint which is measuring 1.2 cm (series 602, image 5). There is no  evidence of jumped or perched facet. No evidence of fracture by MR.  Central canal: Mild stenosis  Neural foramen:  Moderate right greater than left stenosis.  C6-C7: Disc osteophyte complex eccentric towards the left with mild facet arthropathy  Central canal: No stenosis  Neural foramen:  Severe left and moderate right foraminal stenosis.  C7-T1: Unremarkable  Central canal: No stenosis.  Neural foramen:  No bilateral neural foraminal stenosis.  T1-T2: Disc osteophyte complex. T1 left posterior lateral bone  island. No central canal stenosis.  Mild left and moderate right foraminal stenosis.    Paraspinal Soft Tissues: Interspinous ligamentous edema at the C5-C6 level indicating underlying  ligamentous injury.  Other findings: None..    =====  IMPRESSION:  1.  C5-C6 left facet joint diastases with interspinous ligamentous injury at C5-C6. Given the  degree of diastases, stability of the facet joint is indeterminate and given the possibility of  instability, reevaluation with CT cervical spine is recommended to assess for underlying new osseous  injury which may be MR occult.  2.  The above findings were communicated by telephone to Abby Freedamn NP by Dr. Leslee Hammond on  10/2/2023 3:30 PM.  3.  Multilevel cervical spondylosis with spinal stenosis. There is advanced spinal stenosis  involving the foraminal levels of C6-C7 and to a lesser degree C3-C4 through C5-C6. There is mild  multilevel central canal stenosis involving C3-C4 through C5-C6.  Exam End: 10/01/23  3:20 PM    Specimen Collected: 10/02/23  1:49 PM Last Resulted: 10/02/23  3:35 PM   Received From: [facility]  Result Received: 11/20/23  2:30 PM     Narrative                 Impression   PROCEDURE:  CT BRAIN OR HEAD (71151)     COMPARISON:  [facility], CT, CT BRAIN OR HEAD (60608), 7/12/2023, 2:05 PM.     INDICATIONS:  Left eye droop     TECHNIQUE:  Noncontrast CT scanning is performed through the brain. Dose reduction techniques were used. Dose information is transmitted to the ACR (American College of Radiology) NRDR (National Radiology Data Registry) which includes the Dose Index  Registry.     PATIENT STATED HISTORY: (As transcribed by Technologist)  Left eye droop      FINDINGS:  No evidence of intracranial hemorrhage or extra-axial fluid collection.  Lucencies in the deep periventricular white matter are likely sequelae of chronic small vessel ischemic disease.  Prominence of the sulci.  No mass effect.  Visualized portions of paranasal sinuses  are unremarkable.  Visualized portions of the mastoid air cells are unremarkable.  Visualized portions of the orbits are unremarkable.  IMPRESSION:  Sequelae of chronic small vessel ischemic disease is noted. No evidence of intracranial hemorrhage or extra-axial fluid collection.           LOCATION:  [facility]        Dictated by (CST): Jakub Howard MD on 7/14/2023 at 10:28 AM      Finalized by (CST): Jakub Howard MD on 7/14/2023 at 10:31 AM      Narrative   PROCEDURE:  CT SPINE CERVICAL (CPT=72125)     COMPARISON:  None.     INDICATIONS:  fall, +LOC, lac to back of head     TECHNIQUE:  Noncontrast CT scanning of the cervical spine is performed from the skull base through C7.  Multiplanar reconstructions are generated.  Dose reduction techniques were used. Dose information is transmitted to the ACR (American College of  Radiology) NRDR (National Radiology Data Registry) which includes the Dose Index Registry.     PATIENT STATED HISTORY: (As transcribed by Technologist)  Patient states he fell today.         FINDINGS:  Cervical spine demonstrates normal vertebral body height and alignment.  Adelina Multilevel degenerative changes are present.  The spinal canal is patent.  Evaluation of the soft tissues demonstrates a hematoma in the left paraspinal musculature  measuring up to 5.3 x 2.9 cm. Lung apices are within normal limits.                   Impression   CONCLUSION:       1. Hematoma in the left paraspinal musculature is noted.       2. Fracture at the tip of the C4 spinous process.        LOCATION:  [facility]        Dictated by (CST): Yariel Hammond MD on 7/12/2023 at 2:28 PM      Finalized by (CST): Yariel Hammond MD on 7/12/2023 at 2:33 PM         Exam: CTA ABDOMEN  and  PELVIS WW/O CONTRAST   CPT Code(s): 83838,-700 - CT ANGIO ABD and PELV W/O and W/DYE,Omnipaque 350 100 mL vial     INDICATION:  Crescentic focus of increased T1 signal intensity within the wall of the aorta on a lumbar spine MRI study.  It was uncertain if this represented calcification or intramural hemorrhage. A CTA study was recommended for further evaluation.     COMPARISON:  5/12/2022 lumbar spine MRI study.     TECHNIQUE: Routine CTA study of the abdomen and pelvis performed with 100 cc of intravenous Omnipaque 350.  Sagittal and coronal reconstructed images. This study was performed on a Siemens Somatom Go CT scanner with both CARE Dose 4D tube modulation and   SAFIRE iterative reconstruction for radiation dose reduction.     FINDINGS:   LUNG BASES: Nonspecific linear atelectasis versus scarring is identified at the lung bases bilaterally. The visualized lung bases are otherwise clear with no focal airspace consolidation. There is no evidence of pleural fluid. Heart size is within normal    limits.     ABDOMEN:  The liver, spleen, pancreas, adrenal glands, and adrenal glands, and kidneys are normal. The gallbladder is unremarkable with no evidence of gallstones or gallbladder wall thickening.     The abdominal aorta is normal in caliber with extensive atherosclerotic plaque and atherosclerotic vascular calcifications identified corresponding to the crescentic areas of T1 hyperintense signal seen on the MRI study. There is no evidence of an   intramural hematoma or aortic dissection. The celiac and superior mesenteric artery origins are widely patent bilaterally. There is a single widely patent left renal artery with 3 smaller caliber, but widely patent right renal arteries. There is focal   atherosclerotic ulceration of the inferior mesenteric artery with no significant stenosis. 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 There is mild tortuosity and dilatation of the proximal right common iliac artery measuring up to 1.6 cm. There is a weblike atherosclerotic plaque within the  proximal right common iliac artery with no   significant right common iliac artery stenosis. Atherosclerotic calcifications are identified elsewhere within the external and internal iliac arteries with no other significant stenosis.     PELVIS: There is mild to moderate nonspecific heterogeneous enlargement of the prostate gland. The bladder is unremarkable with no significant bladder wall thickening. Normal caliber pelvic bowel loops with no evidence of bowel wall thickening. Normal   caliber bowel with no evidence of bowel wall thickening. Sigmoid colon diverticulosis is present with no evidence of diverticulitis. The appendix is normal. There is no evidence of free fluid or lymphadenopathy throughout the abdomen or pelvis. There is   a redemonstrated grade 1 anterolisthesis of L4 on L5 with bilateral L5 pars defects. Severe diffuse moderate to severe diffuse lumbar spondylosis and degenerative facet disease are again identified. The bone structures otherwise unremarkable with no   lytic or sclerotic bone lesions.     IMPRESSION:   1. 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 This finding was discussed by phone with Dr. Hernandez. Conventional angiography is recommended for further evaluation.   2. Extensive atherosclerotic plaque and extensive atherosclerotic calcifications throughout the abdominal aorta and iliac vessels. No evidence of aortic dissection or intramural hematoma. There is mild tortuosity and dilatation of the right common iliac   artery with a weblike plaque. No significant right common iliac artery narrowing is appreciated.   3. Nonspecific linear atelectasis versus scarring at the lung bases bilaterally.   4. Incidental findings include mild to moderate nonspecific heterogeneous enlargement of the prostate glands, sigmoid colon  diverticulosis, a redemonstrated grade 1 anterolisthesis of L4 on L5 with bilateral L5 pars defects, and moderate to severe   diffuse lumbar spondylosis which is better evaluated on on the lumbar spine MRI study.     Interpreting Radiologist:     Dave Pereira M.D.   Electronically Signed: 05/13/2022 04:08 PM    Exam: MRI LUMBAR SP W/O CONTRAST   CPT Code(s): 42543 - MRI LUMBAR SPINE W/O DYE     MRI LUMBAR SPINE WITHOUT CONTRAST     HISTORY: Low back pain, unspecified     COMPARISON: None currently available.     TECHNIQUE: Routine MRI exam performed on a wide bore ultra high field 3.0 T Siemens Skyra MR scanner, without intravenous contrast.     FINDINGS: Convex left curvature of the lumbar spine measuring 16 degrees. Vertebral body heights are maintained. Grade 1 retrolisthesis at the L4-5 level. Grade 1 anterolisthesis at the L5-S1 level. Multilevel discogenic reactive marrow changes. The tip   of the conus medullaris is at the L1 level. Visualized spinal cord and cauda equina have a normal appearance. Paraspinous musculature appears demonstrate mild to moderate fatty atrophy. Multilevel atherosclerosis of the abdominal aorta. In the distal   aorta there crescentic areas of increased signal on T1-weighted sequences which may represent calcification however the possibility of an intramural hemorrhage cannot be excluded.     T11-12: Disc degeneration characterized by disc desiccation and a broad posterior disc bulge. Bilateral facet arthropathy. No central canal stenosis. Moderate to severe right and mild left neural foraminal narrowing.     T12-L1: Disc degeneration characterized by disc desiccation, 30-60% loss of disc height and a shallow posterior disc bulge with a superimposed right subarticular protrusion measuring 3 to 4 mm AP dimension. No significant central canal stenosis. Mild   right and no left neural foraminal narrowing. Mild bilateral facet arthropathy.     L1-2: Disc degeneration characterized  by disc desiccation and a shallow posterior disc bulge. Disc height is maintained. Mild facet arthropathy and ligamentum flavum hypertrophy. No significant central canal stenosis or neural foraminal narrowing.     L2-3: Disc degeneration characterized by disc desiccation, 30-60% loss of disc height and a broad posterior disc bulge. Mild to moderate ligamentum flavum hypertrophy and mild facet arthropathy. No central canal stenosis. Mild bilateral neural foraminal   narrowing.     L3-4: Disc degeneration characterized by disc desiccation, 30-60% loss of disc height and a broad posterior disc bulge. Mild to moderate ligamentum flavum hypertrophy and mild facet arthropathy. No central canal stenosis. Mild bilateral neural foraminal   narrowing.     L4-5: Grade 1 retrolisthesis. Disc degeneration characterized by disc desiccation, greater than 60% loss of disc height and a broad posterior disc bulge. Dorsal annular fissure. Mild facet arthropathy and ligamentum flavum hypertrophy. Narrowing of the   left subarticular recess with encroachment on the left L5 nerve root. Moderate left neural foraminal narrowing. Mild right neural foraminal narrowing.     L5-S1: Grade 1 anterolisthesis. Disc degeneration characterized by disc desiccation and a broad posterior disc bulge. Mild facet arthropathy and ligamentum flavum hypertrophy. No central canal stenosis. Moderate to severe right and severe left neural   foraminal narrowing.     IMPRESSION:   1. L5-S1: Grade 1 anterolisthesis combined with multifactorial degenerative changes results in moderate to severe right and severe left neural foraminal narrowing. No central canal stenosis.   2. L4-5: Grade 1 retrolisthesis combined with multifactorial degenerative changes results in marked narrowing of the left subarticular recess with encroachment on the left L5 nerve root, moderate left neural foraminal narrowing and mild right neural   foraminal narrowing.   3. L3-4: Mild bilateral  neural foraminal narrowing. No central canal stenosis.   4. L2-3: Mild bilateral neural foraminal narrowing. No central canal stenosis.   5. Diffuse aortic atherosclerosis. Crescentic area of increased signal on T1 sequences in the distal aorta may represent calcification, however, an intramural hemorrhage cannot be excluded based on this exam. A CTA of the abdominal aorta is recommended   for further evaluation. This finding and recommendations were discussed with Dr. Hernandez at the time of this interpretation with voiced understanding.     Interpreting Radiologist:     Armain Grimes M.D.   Electronically Signed: 05/13/2022 09:06 AM    DATE OF SERVICE: 12.23.2021   SACROILIACS, 1 FRONTAL AND 2 OBLIQUE VIEWS     CLINICAL INFORMATION:  Psoriatic arthritis (HCC).     COMPARISON STUDY: None.     FINDINGS:       There are no definite acute fractures or dislocations.     No radiographic signs of ankylosis/fusion or significant degenerative disease.     No bony lesions are identified.     Diffuse osteopenia is present.     Impression   IMPRESSION:     Unremarkable radiographic examination of the sacroiliac joints.      DATE OF SERVICE: 12.23.2021   LUMBAR SPINE AP, LATERAL, FLEXION, AND EXTENSION VIEWS     CLINICAL INDICATION: Chronic left-sided low back pain     COMPARISON: None     FINDINGS: Routine views of the lumbar spine show 5 lumbar-type vertebral bodies.     There is levoscoliosis of the thoracolumbar spine. There is grade 1 spondylolisthesis of L5 on S1.   Otherwise, overall alignment of the lumbar spine is within normal limits.. The vertebral body   heights appear well-maintained. The visualized pedicles appear grossly intact.     No definite fracture or subluxation is seen. Flexion and extension views demonstrate no significant   abnormal movements.     Decreased disc height, endplate osteophytes, and/or endplate sclerotic changes consistent with   degenerative spondylosis. Posterior facet arthropathy notable  at the mid to lower lumbar spine and   particularly at the lumbosacral junction.     If there is continued clinical concern, cross-sectional imaging may be considered depending on the   remainder of the patient's clinical situation.       Extensive arteriovascular calcifications of the abdominal aorta noted.     Impression   IMPRESSION:   Moderate to severe lumbar spondylosis including degenerative disc disease and posterior facet   arthropathy as detailed above.        PROCEDURE:  MRI BRAIN MRA HEAD+MRA NECK (ALL W+WO) (CPT=70553/77307/33280)       COMPARISON:  None.       INDICATIONS:  eval CVA       TECHNIQUE:  MRI of the brain was performed with multi-planar T1, T2-weighted images with FLAIR sequences and diffusion weighted images without and with infusion.  MR angiography of the brain without and with infusion and MR angiography of the neck   without and with infusion was performed using 3D time of flight, multi-planar and 3D reconstructed images. All measurements obtained in this exam were performed using NASCET criteria.       PATIENT STATED HISTORY:(As transcribed by Technologist)  Patient with unsteady gait.        CONTRAST USED:  14 mL of Dotarem       FINDINGS:        MRI BRAIN   The ventricles and sulci are normal in size for the patient's age with mild atrophy present not uncommon for the age of 81.  No mass-effect, midline shift, hydrocephalus, herniation, extra-axial fluid collections, or signs of acute territorial infarct,   or brain tumor.       No abnormality of midline structures. No sign of restricted diffusion. No pathologic gradient susceptibility pattern.       Flow voids are present within the internal carotid and basilar arteries. No sign of acute fluid in the paranasal sinuses.       With contrast infusion, there is no abnormal enhancement pattern identified.       MRA HEAD AND NECK       Patency of the carotid and vertebral arterial structures of the neck, with no signs of occlusion,  high-grade stenosis, acute thrombosis, acute dissection involving the common carotid, internal carotid, external carotid, vertebral arteries bilaterally.     Both vertebral arteries join forming basilar artery.  The basilar artery is patent.       Intracranial circulation demonstrates patency of the intracranial ICA bilaterally, the right and left anterior, middle, and right posterior cerebral arteries without evidence for high-grade stenosis, occlusion, or other acute abnormality.  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  No signs of occlusion.  No restricted diffusion or other features for acute infarct.         Dictated by (CST): Gio Trammell MD on 6/20/2021 at 4:36 PM       Finalized by (CST): Gio Trammell MD on 6/20/2021 at 4:45 PM       PROCEDURE:  XR CHEST AP PORTABLE  (CPT=71045)       TECHNIQUE:  AP chest radiograph was obtained.       COMPARISON:  [facility] , XR, CHEST AP PORT, 3/26/2011, 10:00 AM.       INDICATIONS:  dizziness and feeling unsteady on feet with nausea starting at 4am, vomited a couple hours ago       PATIENT STATED HISTORY: (As transcribed by Technologist)  Patient stated having symptoms of dizziness.            FINDINGS:  The heart is borderline in size.  The lungs are clear of acute-appearing disease process.  The costophrenic angles are sharp.  There is no active disease seen on the basis of portable radiography.                        Impression   CONCLUSION:  Borderline heart size. No active disease seen.           Dictated by (CST): Gio Trammell MD on 6/20/2021 at 3:46 PM       Finalized by (CST): Gio Trammell MD on 6/20/2021 at 3:47 PM      DATE OF SERVICE: 04.22.2021     LEFT FOOT  AP, LATERAL AND OBLIQUE (3 VIEWS)   CLINICAL INFORMATION:  Left foot pain.   COMPARISON STUDY:  Left foot, 12/6/2020.   FINDINGS:   No acute fracture or dislocation is seen.   Diffuse osteopenia is present.   The joint spaces are essentially maintained.   The lateral view shows a small inferior calcaneal exostosis (plantar calcaneal spur)    DATE OF SERVICE: 01.28.2021     XR HAND (MIN 3 VIEWS), LEFT (CPT=73130), XR HAND (MIN 3 VIEWS), RIGHT (CPT=73130), XR WRIST COMPLETE   (MIN 3 VIEWS), LEFT (CPT=73110), XR WRIST COMPLETE (MIN 3 VIEWS), RIGHT (CPT=73110)   CLINICAL INDICATION: Bilateral hand swelling. Elevated sedimentation rate and CRP. History of   psoriasis.   COMPARISON STUDIES: None available.   FINDINGS:   Right hand: There is no evidence of acute fracture or dislocation. The MCP joints and   interphalangeal joints of the hand are preserved. No degenerative osteophytes or discrete osseous   erosions are seen at these joints.   Left hand: There is no evidence of acute fracture or dislocation. The MCP joints and interphalangeal   joints of the hand are preserved. No degenerative osteophytes or discrete osseous erosions are seen   at these joints.   Right wrist: There is no evidence of acute fracture or dislocation. There is mild narrowing of the   thumb CMC joint, with mild marginal osteophyte formation. All other joint spaces of the wrist are   preserved. No discrete osseous erosions are identified.   Left wrist: There is no evidence of acute fracture or dislocation. There is mild narrowing of the   thumb CMC joint, with mild marginal osteophyte formation. All other joint spaces of the wrist are   preserved. No discrete osseous erosions are identified.   Impression   IMPRESSION:   1. 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 Otherwise, no significant arthritic   changes are seen in the bilateral hands and wrists.     DATE OF SERVICE: 01.01.2021     CT CHEST   CLINICAL INDICATION: Follow-up abnormal chest CT   COMPARISON: Chest CT 5/1/2020   TECHNIQUE: 1.25mm thick axial images were obtained  through the chest. Coronal reconstructions were   included.   Automated exposure control and ALARA manual techniques for patient specific dose reduction were   followed while maintaining the necessary diagnostic image quality.   FINDINGS:     Lungs and large airways: A nodular density previously seen in the superior segment of the right   lower lobe is no longer visible. Multiple additional scattered small 1 to 2 mm nodular densities are   seen. A groundglass attenuation density measuring approximately 5 mm on image 55 in the right upper   lobe is stable. A 3 mm nodule in the right upper lobe on image 69 looks new, and may be a mucous   filled bronchus. A subpleural 3 mm nodular density in the left lower lobe on image 168 is stable.   There is platelike atelectasis or scarring in the lingula which is slightly more pronounced than   previously. Diffuse underlying emphysematous changes are present. There are platelike areas of   scarring or atelectasis in both lung bases. Biapical pleural parenchymal scarring is present.   Pleura:  Normal. No pleural effusion or thickening.   Heart and pericardium:  There are calcifications in the plane of the aortic valve, suggestive of   calcific aortic stenosis..   Mediastinum and dayan:  Normal.   Chest wall and lower neck:  Normal.   Vessels:  Atherosclerotic changes in the aorta and coronary arteries.   Bones:  Multilevel degenerative changes are present in the spine. Postoperative changes are noted in   the left hemithorax.   Upper abdomen: There is a small hiatal hernia.   Impression   IMPRESSION:   1. Emphysema.   2. Multiple small nodular densities in the lungs measuring up to 3 mm as described above.   3. Atherosclerosis and possible calcific aortic stenosis     Labs:  Lab Results   Component Value Date    WBC 7.2 06/11/2024    RBC 4.71 06/11/2024    HGB 15.4 06/11/2024    HCT 44.4 06/11/2024    .0 06/11/2024    MPV 9.9 03/26/2011    MCV 94.3 06/11/2024    MCH 32.7  06/11/2024    MCHC 34.7 06/11/2024    RDW 12.9 06/11/2024    NEPRELIM 4.26 06/11/2024    NEPERCENT 59.0 06/11/2024    LYPERCENT 15.1 06/11/2024    MOPERCENT 12.3 06/11/2024    EOPERCENT 13.0 06/11/2024    BAPERCENT 0.3 06/11/2024    NE 4.26 06/11/2024    LYMABS 1.09 06/11/2024    MOABSO 0.89 06/11/2024    EOABSO 0.94 (H) 06/11/2024    BAABSO 0.02 06/11/2024     Lab Results   Component Value Date    GLU 98 06/11/2024    BUN 11 06/11/2024    BUNCREA 17.1 12/02/2022    CREATSERUM 0.97 06/11/2024    ANIONGAP 5 06/11/2024    GFR >59 04/23/2010    GFRNAA 64 01/13/2022    GFRAA 74 01/13/2022    CA 9.1 06/11/2024    OSMOCALC 281 06/11/2024    ALKPHO 70 06/11/2024    AST 13 (L) 06/11/2024    ALT 22 06/11/2024    BILT 0.8 06/11/2024    TP 6.6 06/11/2024    ALB 3.4 06/11/2024    GLOBULIN 3.2 06/11/2024    AGRATIO 2.3 12/08/2014     06/11/2024    K 4.1 06/11/2024     06/11/2024    CO2 28.0 06/11/2024       Additional Labs:  06/2024  ESR 15 normal  CRP normal      02/2024  ESR 18 normal  CRP normal  Vitamin D 64.1 normal  CMP grossly normal  CBC grossly normal  B12 503 normal  B6 normal  B1 normal  Phosphorus 2.7 normal  Mag 2.2 normal  QuantiFERON-TB negative    12/2022   Mag 2.1 normal  B12 677 normal   Vit D 65.6 normal   Tsh normal    ESR 5 normal  CRP normal   Uric acid 5.0     01/2022  SOO 1: 160 AMA  SOO 1: 160 speckled  dsDNA, Smith, histone negative  Remainder of EDMOND panel negative    05/2021  ESR 11  CRP normal     03/2021  ESR 9  CRP normal     01/2021  SOO 1:80  ESR 49 elevated  CRP 2.23 elevated  TB negative     12/2020  Uric acid 4.5  CCP neg  RF neg  SSA neg  SSB neg  ESR 36   CRP 1.94 elevated    Leslee Hernandez DO  [facility]  10/11/2024

## 2024-10-14 ENCOUNTER — TELEPHONE (OUTPATIENT)
Dept: RHEUMATOLOGY | Facility: CLINIC | Age: 84
End: 2024-10-14

## 2024-10-14 NOTE — TELEPHONE ENCOUNTER
Pt. Called to request order for MRI to be faxed to [facility]. [phone].    Informed pt. that the information from [facility] is just informational. Understanding verbalized.

## 2024-10-17 ENCOUNTER — LAB ENCOUNTER (OUTPATIENT)
Dept: LAB | Facility: HOSPITAL | Age: 84
End: 2024-10-17
Attending: INTERNAL MEDICINE
Payer: MEDICARE

## 2024-10-17 DIAGNOSIS — I70.0 AORTIC ATHEROSCLEROSIS (HCC): ICD-10-CM

## 2024-10-17 DIAGNOSIS — E55.9 VITAMIN D DEFICIENCY: ICD-10-CM

## 2024-10-17 DIAGNOSIS — R29.898 WEAKNESS OF BOTH LOWER EXTREMITIES: ICD-10-CM

## 2024-10-17 DIAGNOSIS — R53.82 CHRONIC FATIGUE: ICD-10-CM

## 2024-10-17 DIAGNOSIS — D84.821 IMMUNOCOMPROMISED STATE DUE TO DRUG THERAPY (HCC): ICD-10-CM

## 2024-10-17 DIAGNOSIS — Z79.899 ENCOUNTER FOR MONITORING STATIN THERAPY: ICD-10-CM

## 2024-10-17 DIAGNOSIS — Z79.899 IMMUNOCOMPROMISED STATE DUE TO DRUG THERAPY (HCC): ICD-10-CM

## 2024-10-17 DIAGNOSIS — L40.0 PLAQUE PSORIASIS: ICD-10-CM

## 2024-10-17 DIAGNOSIS — R91.8 GROUND GLASS OPACITY PRESENT ON IMAGING OF LUNG: ICD-10-CM

## 2024-10-17 DIAGNOSIS — Z51.81 ENCOUNTER FOR MONITORING STATIN THERAPY: ICD-10-CM

## 2024-10-17 DIAGNOSIS — L40.50 PSORIATIC ARTHRITIS (HCC): ICD-10-CM

## 2024-10-17 LAB
ANION GAP SERPL CALC-SCNC: 5 MMOL/L (ref 0–18)
BUN BLD-MCNC: 15 MG/DL (ref 9–23)
C3 SERPL-MCNC: 96.4 MG/DL (ref 90–170)
C4 SERPL-MCNC: 13.1 MG/DL (ref 12–36)
CALCIUM BLD-MCNC: 9.8 MG/DL (ref 8.7–10.4)
CHLORIDE SERPL-SCNC: 100 MMOL/L (ref 98–112)
CHOLEST SERPL-MCNC: 170 MG/DL (ref ?–200)
CO2 SERPL-SCNC: 29 MMOL/L (ref 21–32)
CREAT BLD-MCNC: 1.05 MG/DL
CRP SERPL-MCNC: <0.4 MG/DL (ref ?–0.5)
EGFRCR SERPLBLD CKD-EPI 2021: 70 ML/MIN/1.73M2 (ref 60–?)
ERYTHROCYTE [SEDIMENTATION RATE] IN BLOOD: 20 MM/HR
FASTING PATIENT LIPID ANSWER: NO
FASTING STATUS PATIENT QL REPORTED: NO
GLUCOSE BLD-MCNC: 89 MG/DL (ref 70–99)
HDLC SERPL-MCNC: 70 MG/DL (ref 40–59)
LDLC SERPL CALC-MCNC: 85 MG/DL (ref ?–100)
MAGNESIUM SERPL-MCNC: 1.9 MG/DL (ref 1.6–2.6)
NONHDLC SERPL-MCNC: 100 MG/DL (ref ?–130)
OSMOLALITY SERPL CALC.SUM OF ELEC: 278 MOSM/KG (ref 275–295)
POTASSIUM SERPL-SCNC: 4.3 MMOL/L (ref 3.5–5.1)
SODIUM SERPL-SCNC: 134 MMOL/L (ref 136–145)
TRIGL SERPL-MCNC: 79 MG/DL (ref 30–149)
VIT D+METAB SERPL-MCNC: 81.6 NG/ML (ref 30–100)
VLDLC SERPL CALC-MCNC: 13 MG/DL (ref 0–30)

## 2024-10-17 PROCEDURE — 85652 RBC SED RATE AUTOMATED: CPT

## 2024-10-17 PROCEDURE — 83516 IMMUNOASSAY NONANTIBODY: CPT

## 2024-10-17 PROCEDURE — 83735 ASSAY OF MAGNESIUM: CPT

## 2024-10-17 PROCEDURE — 86140 C-REACTIVE PROTEIN: CPT

## 2024-10-17 PROCEDURE — 86160 COMPLEMENT ANTIGEN: CPT

## 2024-10-17 PROCEDURE — 82306 VITAMIN D 25 HYDROXY: CPT

## 2024-10-17 PROCEDURE — 86038 ANTINUCLEAR ANTIBODIES: CPT

## 2024-10-17 PROCEDURE — 80061 LIPID PANEL: CPT

## 2024-10-17 PROCEDURE — 86235 NUCLEAR ANTIGEN ANTIBODY: CPT

## 2024-10-17 PROCEDURE — 36415 COLL VENOUS BLD VENIPUNCTURE: CPT

## 2024-10-17 PROCEDURE — 80048 BASIC METABOLIC PNL TOTAL CA: CPT

## 2024-10-21 LAB — NUCLEAR IGG TITR SER IF: NEGATIVE {TITER}

## 2024-11-03 ENCOUNTER — APPOINTMENT (OUTPATIENT)
Dept: ULTRASOUND IMAGING | Facility: HOSPITAL | Age: 84
End: 2024-11-03
Attending: EMERGENCY MEDICINE
Payer: MEDICARE

## 2024-11-03 ENCOUNTER — HOSPITAL ENCOUNTER (INPATIENT)
Facility: HOSPITAL | Age: 84
LOS: 3 days | Discharge: HOME HEALTH | End: 2024-11-06
Attending: EMERGENCY MEDICINE | Admitting: INTERNAL MEDICINE
Payer: MEDICARE

## 2024-11-03 ENCOUNTER — APPOINTMENT (OUTPATIENT)
Dept: GENERAL RADIOLOGY | Facility: HOSPITAL | Age: 84
End: 2024-11-03
Attending: EMERGENCY MEDICINE
Payer: MEDICARE

## 2024-11-03 DIAGNOSIS — R55 SYNCOPE AND COLLAPSE: ICD-10-CM

## 2024-11-03 DIAGNOSIS — J18.9 COMMUNITY ACQUIRED PNEUMONIA OF LEFT LOWER LOBE OF LUNG: Primary | ICD-10-CM

## 2024-11-03 DIAGNOSIS — E87.1 HYPONATREMIA: ICD-10-CM

## 2024-11-03 DIAGNOSIS — E80.6 HYPERBILIRUBINEMIA: ICD-10-CM

## 2024-11-03 LAB
ALBUMIN SERPL-MCNC: 4.1 G/DL (ref 3.2–4.8)
ALBUMIN/GLOB SERPL: 1.9 {RATIO} (ref 1–2)
ALP LIVER SERPL-CCNC: 60 U/L
ALT SERPL-CCNC: 8 U/L
ANION GAP SERPL CALC-SCNC: 5 MMOL/L (ref 0–18)
AST SERPL-CCNC: 13 U/L (ref ?–34)
BASOPHILS # BLD AUTO: 0.03 X10(3) UL (ref 0–0.2)
BASOPHILS NFR BLD AUTO: 0.2 %
BILIRUB SERPL-MCNC: 2.4 MG/DL (ref 0.2–1.1)
BILIRUB UR QL STRIP.AUTO: NEGATIVE
BUN BLD-MCNC: 14 MG/DL (ref 9–23)
CALCIUM BLD-MCNC: 9.1 MG/DL (ref 8.7–10.4)
CHLORIDE SERPL-SCNC: 94 MMOL/L (ref 98–112)
CLARITY UR REFRACT.AUTO: CLEAR
CO2 SERPL-SCNC: 29 MMOL/L (ref 21–32)
CREAT BLD-MCNC: 1.09 MG/DL
EGFRCR SERPLBLD CKD-EPI 2021: 67 ML/MIN/1.73M2 (ref 60–?)
EOSINOPHIL # BLD AUTO: 0.01 X10(3) UL (ref 0–0.7)
EOSINOPHIL NFR BLD AUTO: 0.1 %
ERYTHROCYTE [DISTWIDTH] IN BLOOD BY AUTOMATED COUNT: 13.5 %
FLUAV + FLUBV RNA SPEC NAA+PROBE: NEGATIVE
FLUAV + FLUBV RNA SPEC NAA+PROBE: NEGATIVE
GLOBULIN PLAS-MCNC: 2.2 G/DL (ref 2–3.5)
GLUCOSE BLD-MCNC: 121 MG/DL (ref 70–99)
GLUCOSE UR STRIP.AUTO-MCNC: NORMAL MG/DL
HCT VFR BLD AUTO: 40 %
HGB BLD-MCNC: 14.4 G/DL
IMM GRANULOCYTES # BLD AUTO: 0.06 X10(3) UL (ref 0–1)
IMM GRANULOCYTES NFR BLD: 0.5 %
KETONES UR STRIP.AUTO-MCNC: 10 MG/DL
LACTATE SERPL-SCNC: 1.1 MMOL/L (ref 0.5–2)
LEUKOCYTE ESTERASE UR QL STRIP.AUTO: NEGATIVE
LYMPHOCYTES # BLD AUTO: 0.68 X10(3) UL (ref 1–4)
LYMPHOCYTES NFR BLD AUTO: 5.6 %
MCH RBC QN AUTO: 32.7 PG (ref 26–34)
MCHC RBC AUTO-ENTMCNC: 36 G/DL (ref 31–37)
MCV RBC AUTO: 90.9 FL
MONOCYTES # BLD AUTO: 2.04 X10(3) UL (ref 0.1–1)
MONOCYTES NFR BLD AUTO: 16.7 %
NEUTROPHILS # BLD AUTO: 9.37 X10 (3) UL (ref 1.5–7.7)
NEUTROPHILS # BLD AUTO: 9.37 X10(3) UL (ref 1.5–7.7)
NEUTROPHILS NFR BLD AUTO: 76.9 %
NITRITE UR QL STRIP.AUTO: NEGATIVE
OSMOLALITY SERPL CALC.SUM OF ELEC: 268 MOSM/KG (ref 275–295)
PH UR STRIP.AUTO: 6 [PH] (ref 5–8)
PLATELET # BLD AUTO: 164 10(3)UL (ref 150–450)
POTASSIUM SERPL-SCNC: 4 MMOL/L (ref 3.5–5.1)
PROT SERPL-MCNC: 6.3 G/DL (ref 5.7–8.2)
PROT UR STRIP.AUTO-MCNC: NEGATIVE MG/DL
RBC # BLD AUTO: 4.4 X10(6)UL
RBC UR QL AUTO: NEGATIVE
RSV RNA SPEC NAA+PROBE: NEGATIVE
SARS-COV-2 RNA RESP QL NAA+PROBE: NOT DETECTED
SODIUM SERPL-SCNC: 128 MMOL/L (ref 136–145)
SP GR UR STRIP.AUTO: 1.01 (ref 1–1.03)
TROPONIN I SERPL HS-MCNC: 6 NG/L
UROBILINOGEN UR STRIP.AUTO-MCNC: NORMAL MG/DL
WBC # BLD AUTO: 12.2 X10(3) UL (ref 4–11)

## 2024-11-03 PROCEDURE — 93010 ELECTROCARDIOGRAM REPORT: CPT

## 2024-11-03 PROCEDURE — 81003 URINALYSIS AUTO W/O SCOPE: CPT | Performed by: EMERGENCY MEDICINE

## 2024-11-03 PROCEDURE — 85025 COMPLETE CBC W/AUTO DIFF WBC: CPT | Performed by: EMERGENCY MEDICINE

## 2024-11-03 PROCEDURE — 87205 SMEAR GRAM STAIN: CPT | Performed by: INTERNAL MEDICINE

## 2024-11-03 PROCEDURE — 87070 CULTURE OTHR SPECIMN AEROBIC: CPT | Performed by: INTERNAL MEDICINE

## 2024-11-03 PROCEDURE — 93005 ELECTROCARDIOGRAM TRACING: CPT

## 2024-11-03 PROCEDURE — 71046 X-RAY EXAM CHEST 2 VIEWS: CPT | Performed by: EMERGENCY MEDICINE

## 2024-11-03 PROCEDURE — 99285 EMERGENCY DEPT VISIT HI MDM: CPT

## 2024-11-03 PROCEDURE — 96365 THER/PROPH/DIAG IV INF INIT: CPT

## 2024-11-03 PROCEDURE — 87449 NOS EACH ORGANISM AG IA: CPT | Performed by: INTERNAL MEDICINE

## 2024-11-03 PROCEDURE — 80053 COMPREHEN METABOLIC PANEL: CPT | Performed by: EMERGENCY MEDICINE

## 2024-11-03 PROCEDURE — 87040 BLOOD CULTURE FOR BACTERIA: CPT | Performed by: EMERGENCY MEDICINE

## 2024-11-03 PROCEDURE — 0241U SARS-COV-2/FLU A AND B/RSV BY PCR (GENEXPERT): CPT | Performed by: EMERGENCY MEDICINE

## 2024-11-03 PROCEDURE — 94660 CPAP INITIATION&MGMT: CPT

## 2024-11-03 PROCEDURE — 83605 ASSAY OF LACTIC ACID: CPT | Performed by: EMERGENCY MEDICINE

## 2024-11-03 PROCEDURE — 36415 COLL VENOUS BLD VENIPUNCTURE: CPT

## 2024-11-03 PROCEDURE — 84484 ASSAY OF TROPONIN QUANT: CPT | Performed by: EMERGENCY MEDICINE

## 2024-11-03 PROCEDURE — 76700 US EXAM ABDOM COMPLETE: CPT | Performed by: EMERGENCY MEDICINE

## 2024-11-03 RX ORDER — ROSUVASTATIN CALCIUM 5 MG/1
2.5 TABLET, COATED ORAL NIGHTLY
Status: DISCONTINUED | OUTPATIENT
Start: 2024-11-03 | End: 2024-11-06

## 2024-11-03 RX ORDER — ENOXAPARIN SODIUM 100 MG/ML
40 INJECTION SUBCUTANEOUS DAILY
Status: DISCONTINUED | OUTPATIENT
Start: 2024-11-03 | End: 2024-11-06

## 2024-11-03 RX ORDER — CETIRIZINE HYDROCHLORIDE 10 MG/1
10 TABLET ORAL NIGHTLY
Status: DISCONTINUED | OUTPATIENT
Start: 2024-11-04 | End: 2024-11-03

## 2024-11-03 RX ORDER — FLUTICASONE PROPIONATE AND SALMETEROL 250; 50 UG/1; UG/1
1 POWDER RESPIRATORY (INHALATION) 2 TIMES DAILY
Status: DISCONTINUED | OUTPATIENT
Start: 2024-11-03 | End: 2024-11-06

## 2024-11-03 RX ORDER — FAMOTIDINE 20 MG/1
20 TABLET, FILM COATED ORAL AS DIRECTED
COMMUNITY
Start: 2024-08-12

## 2024-11-03 RX ORDER — LOSARTAN POTASSIUM 50 MG/1
50 TABLET ORAL DAILY
Status: DISCONTINUED | OUTPATIENT
Start: 2024-11-04 | End: 2024-11-04

## 2024-11-03 RX ORDER — MONTELUKAST SODIUM 10 MG/1
10 TABLET ORAL DAILY
Status: DISCONTINUED | OUTPATIENT
Start: 2024-11-03 | End: 2024-11-06

## 2024-11-03 RX ORDER — PANTOPRAZOLE SODIUM 40 MG/1
40 TABLET, DELAYED RELEASE ORAL DAILY
Status: DISCONTINUED | OUTPATIENT
Start: 2024-11-03 | End: 2024-11-06

## 2024-11-03 RX ORDER — CETIRIZINE HYDROCHLORIDE 5 MG/1
5 TABLET ORAL NIGHTLY
Status: DISCONTINUED | OUTPATIENT
Start: 2024-11-03 | End: 2024-11-06

## 2024-11-03 RX ORDER — ALBUTEROL SULFATE 90 UG/1
2 INHALANT RESPIRATORY (INHALATION) EVERY 6 HOURS PRN
Status: DISCONTINUED | OUTPATIENT
Start: 2024-11-03 | End: 2024-11-06

## 2024-11-03 RX ORDER — LORAZEPAM 1 MG/1
1 TABLET ORAL NIGHTLY PRN
Status: DISCONTINUED | OUTPATIENT
Start: 2024-11-03 | End: 2024-11-06

## 2024-11-03 RX ORDER — PANTOPRAZOLE SODIUM 40 MG/1
1 TABLET, DELAYED RELEASE ORAL DAILY
COMMUNITY

## 2024-11-03 RX ORDER — ACETAMINOPHEN 500 MG
500 TABLET ORAL EVERY 4 HOURS PRN
Status: DISCONTINUED | OUTPATIENT
Start: 2024-11-03 | End: 2024-11-06

## 2024-11-03 RX ORDER — CETIRIZINE HYDROCHLORIDE 5 MG/1
5 TABLET ORAL NIGHTLY
Status: DISCONTINUED | OUTPATIENT
Start: 2024-11-03 | End: 2024-11-03

## 2024-11-03 RX ORDER — AZITHROMYCIN 250 MG/1
500 TABLET, FILM COATED ORAL DAILY
Status: COMPLETED | OUTPATIENT
Start: 2024-11-04 | End: 2024-11-05

## 2024-11-03 RX ORDER — CETIRIZINE HYDROCHLORIDE 10 MG/1
10 TABLET ORAL NIGHTLY
COMMUNITY

## 2024-11-03 RX ORDER — LORAZEPAM 0.5 MG/1
0.5 TABLET ORAL NIGHTLY PRN
Status: DISCONTINUED | OUTPATIENT
Start: 2024-11-03 | End: 2024-11-06

## 2024-11-03 NOTE — H&P
General Medicine H&P     Chief Complaint   Patient presents with    Syncope     PER WIFE STATES HE PASSED OUT THIS AM, SON IS AN ER MD AND ADVISED TO COME TO ED     Fever     FEVER -99         PCP: Santana Brown MD    History of Present Illness: Patient is a 84 year old male with PMH including but not limited to COPD, KI, GERD who p/t [facility] ED c syncope.     Pt lives c wife. Reports was going to use bathroom and had sycnopal episode. Pt reports having previous episode ~1yr ago, had vertebral fx. Follows c Dr. Hernandez, reports being on abx ~1m ago, question of aspiration. No cp, +SOB and lot cough/sputum.     Pt currently feeling better now than did yesterday.       Past Medical History:    Allergic rhinitis    COPD    COPD (chronic obstructive pulmonary disease) (HCC)    Empyema    GERD    GERD (gastroesophageal reflux disease)    Heart disease    High cholesterol    OTHER DISEASES    PVD    Pancreas divisum    Pancreatitis x 1 in 5/10    Rotator cuff tear    RIGHT SHOULDER    Seronegative rheumatoid arthritis (HCC)    SLEEP APNEA    AHI 15, REM 25, amirah 88%    Spermatocele      Past Surgical History:   Procedure Laterality Date    Colonoscopy  > 10 years ago    Ndsc ablation & rcnstj [facility] w/o bypass      Repair ing hernia,5+y/o,reducibl  1952        ALL:  Allergies[1]     enoxaparin, 40 mg, Daily  [START ON 2024] azithromycin, 500 mg, Daily  [START ON 2024] cefTRIAXone, 1 g, Q24H        Social History     Tobacco Use    Smoking status: Former     Current packs/day: 0.00     Average packs/day: 1 pack/day for 63.7 years (63.7 ttl pk-yrs)     Types: Cigarettes     Start date: 1955     Quit date: 2018     Years since quittin.1    Smokeless tobacco: Never   Substance Use Topics    Alcohol use: Not Currently     Alcohol/week: 0.0 standard drinks of alcohol     Comment: very rare        Fam Hx  Family History   Problem Relation Age of Onset    Other (Other) Father         COPD, CKD     Cancer Mother         breast       Review of Systems  Comprehensive ROS reviewed and negative except for what's stated above. \    OBJECTIVE:  /74   Pulse 82   Temp 98.8 °F (37.1 °C) (Temporal)   Resp 19   Ht 5' 6\" (1.676 m)   Wt 130 lb (59 kg)   SpO2 96%   BMI 20.98 kg/m²     General:  Alert, no distress, appears stated age.    Head:  Normocephalic, without obvious abnormality, atraumatic.   Eyes:  Sclera anicteric, EOMs intact.    Nose: Nares normal,  Mucosa normal    Throat: Lips normal   Neck: Supple, symmetrical, trachea midline   Lungs:   Clear to auscultation bilaterally. Normal effort   Chest wall:  No tenderness or deformity   Heart:  Regular rate and rhythm, S1, S2 normal, no murmur, rub or gallop appreciated   Abdomen:   Soft, NT/ND, Bowel sounds normal. No masses,  No organomegaly.    Extremities/MSK: Extremities normal/normal movement, atraumatic, no cyanosis  or edema.   Skin: Skin color, texture, turgor normal. No rashes or lesions.    Neurologic: Moving all extremities spontaneously, no focal deficit appreciated          LABS:   Lab Results   Component Value Date    WBC 12.2 11/03/2024    HGB 14.4 11/03/2024    HCT 40.0 11/03/2024    .0 11/03/2024    CREATSERUM 1.09 11/03/2024    BUN 14 11/03/2024     11/03/2024    K 4.0 11/03/2024    CL 94 11/03/2024    CO2 29.0 11/03/2024     11/03/2024    CA 9.1 11/03/2024    ALB 4.1 11/03/2024    ALKPHO 60 11/03/2024    BILT 2.4 11/03/2024    TP 6.3 11/03/2024    AST 13 11/03/2024    ALT 8 11/03/2024       Radiology: US ABDOMEN COMPLETE (CPT=76700)    Result Date: 11/3/2024  PROCEDURE:  US ABDOMEN COMPLETE (CPT=76700)  COMPARISON:  None.  INDICATIONS:  SYNCOPE, FEVER  TECHNIQUE:  Real time gray-scale ultrasound was used to evaluate the abdomen.  The exam includes images of the liver, gallbladder, common bile duct, pancreas, spleen, kidneys, IVC, and aorta.  PATIENT STATED HISTORY: (As transcribed by Technologist)     FINDINGS:   LIVER:  Normal size and echogenicity. No significant masses. BILIARY:  Normal appearing gallbladder, intrahepatic ducts, and common bile duct.  Common bile duct diameter is 3 mm. PANCREAS:  Visualized portions appear within normal limits.  The tail is obscured by overlying gas. SPLEEN:  Normal. KIDNEYS:  Normal.  Right kidney measures 9.8 cm.  Left kidney measures 10.3 cm. AORTA/IVC:  Normal.             CONCLUSION:    The tail the pancreas is obscured by gas.  The visualized head and body appear within normal limits.  The remainder of the abdominal ultrasound survey is otherwise unremarkable.  If there is concern for pancreatic pathology then additional  CT or MRI imaging recommended.   LOCATION:  [facility]    Dictated by (CST): Esequiel Centeno MD on 11/03/2024 at 12:25 PM     Finalized by (CST): Esequiel Centeno MD on 11/03/2024 at 12:28 PM       XR CHEST PA + LAT CHEST (CPT=71046)    Result Date: 11/3/2024  PROCEDURE:  XR CHEST PA + LAT CHEST (CPT=71046)  INDICATIONS:  SYNCOPE, FEVER  COMPARISON:  [facility] , XR, XR CHEST AP PORTABLE  (CPT=71045), 7/12/2023, 2:24 PM.  TECHNIQUE:  PA and lateral chest radiographs were obtained.  PATIENT STATED HISTORY: (As transcribed by Technologist)  Per wife patient had syncope episode at home this morning.    FINDINGS:  LUNGS:  There is left lower lobe consolidation which is consistent with left lower lobe pneumonia.  This is superimposed on chronic COPD type changes in lungs. CARDIAC:  Normal size cardiac silhouette. MEDIASTINUM:  Aorta is atherosclerotic. PLEURA:  Normal.  No pleural effusions. BONES:  Normal for age.            CONCLUSION:  There is left lower lobe consolidation consistent with pneumonia.   LOCATION:  [facility]   Dictated by (CST): Aureliano Marinelli MD on 11/03/2024 at 11:00 AM     Finalized by (CST): Aureliano Marinelli MD on 11/03/2024 at 11:01 AM            ASSESSMENT / PLAN:    84 year old male with PMH including but not limited to COPD, KI, GERD who p/t  ED c  syncope.     # syncope  - possibly 2/2 infection, dehydration, cardiac causes but no cp reported  - tele  - orthostatics  - PT/OT  - cards c/s   - abd ultrasound ok, UA ok    - LA ok, trop neg, follow      # PNA  - CXR c LLL PNA  - WBC 12.2  - follow temps  - blood cxs, urine strep/legionella  - notify pulm as follows as o/p and was previously on abx     # Hyponatremia  - 128, IVF  - ulytes as needed  - renal c/s if persistently low     # COPD  - cont home inhalers    # GERD  -PPI, wean as o/p if appropriate    # KI  -protocol    # HL  -statin      # Proph  - lovenox    Dispo: tele  -Pt lives c wife          Outpatient records or previous hospital records reviewed. [facility] hospitalist to continue to follow patient while in house. A total of 75 minutes taken.  Greater than 50% face to face encounter.      Spike Jalloh MD  [facility] Hospitalist  Pager: [phone]  11/3/2024  3:58 PM         [1]   Allergies  Allergen Reactions    Aspirin NAUSEA ONLY     HIGHER DOSAGES. LOW DOSE ASPIRIN OK    Bactrim [Sulfamethoxazole W/Trimethoprim] RASH    Motrin Ib NAUSEA ONLY    Other UNKNOWN    Sulfamethoxazole UNKNOWN    Amlodipine RASH    Ibuprofen NAUSEA ONLY

## 2024-11-03 NOTE — PROGRESS NOTES
NURSING ADMISSION NOTE      Patient admitted via Cart  Oriented to room.  Safety precautions initiated.  Bed in low position.  Call light in reach.    A&Ox4. Wears reading glasses. Bilateral hearing aids.  On room air, CPAP at night. VSS.  Tele: NSR with PACs.   Family present at bedside.   IV abx.   SBA with cane.   Sz precautions in place.

## 2024-11-03 NOTE — ED QUICK NOTES
PATIENT TO ROOM FROM WAITING ROOM, PLACED ON CARDIAC MONITOR, PULSE OX AND BP CUFF. 2 FAMILY MEMBERS AT BEDSIDE. BLANKET PROVIDED. CALL LIGHT WITHIN REACH. FAMILY CONCERNED OF PATIENTS VITAL SIGNS.

## 2024-11-03 NOTE — ED PROVIDER NOTES
Patient Seen in: [facility] Emergency Department      History     Chief Complaint   Patient presents with    Syncope     PER WIFE STATES HE PASSED OUT THIS AM, SON IS AN ER MD AND ADVISED TO COME TO ED     Fever     FEVER -99      Stated Complaint: SYNCOPE, FEVER    Subjective:   HPI      84-year-old male with history of COPD, GERD, hyperlipidemia brought in after a syncopal episode at home.  He reports 1 day ago he started feeling very fatigued and slept most of the day yesterday.  Reporting fevers up to 102 °F over the last 2 days.  He feels he is coughing a bit more than his normal COPD cough.  Denies any nausea, vomiting, diarrhea.  No known recent sick contacts.  He states that today he got up and went to go to the bathroom and had a syncopal episode.  While in the bathroom he urinated and then says he stepped up to the sink and was going to check his temperature and fainted.  He does not recall feeling any chest pain or palpitations.  Does not recall feeling lightheaded or weak or dizzy.  Does not believe he hit his head.  Denies any headache or neck pain at this time.  Denies any abdominal pain.    Objective:     Past Medical History:    Allergic rhinitis    COPD    COPD (chronic obstructive pulmonary disease) (HCC)    Empyema    GERD    GERD (gastroesophageal reflux disease)    Heart disease    High cholesterol    OTHER DISEASES    PVD    Pancreas divisum    Pancreatitis x 1 in 5/10    Rotator cuff tear    RIGHT SHOULDER    Seronegative rheumatoid arthritis (HCC)    SLEEP APNEA    AHI 15, REM 25, amirah 88%    Spermatocele              Past Surgical History:   Procedure Laterality Date    Colonoscopy  > 10 years ago    Ndsc ablation & rcnstj [facility] w/o bypass      Repair ing hernia,5+y/o,reducibl  01/01/1952                Social History     Socioeconomic History    Marital status:    Occupational History    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   Tobacco Use    Smoking status: Former     Current  packs/day: 0.00     Average packs/day: 1 pack/day for 63.7 years (63.7 ttl pk-yrs)     Types: Cigarettes     Start date: 1955     Quit date: 2018     Years since quittin.1    Smokeless tobacco: Never   Vaping Use    Vaping status: Never Used   Substance and Sexual Activity    Alcohol use: Not Currently     Alcohol/week: 0.0 standard drinks of alcohol     Comment: very rare    Drug use: No   Other Topics Concern    Occupational Exposure Yes     Comment: steel mill, [employer] dust    Exercise Yes     Comment: walks about 1 mile at least 2 -3 times a week     Social Drivers of Health     Food Insecurity: No Food Insecurity (11/3/2024)    Food Insecurity     Food Insecurity: Never true   Transportation Needs: No Transportation Needs (11/3/2024)    Transportation Needs     Lack of Transportation: No   Housing Stability: Low Risk  (11/3/2024)    Housing Stability     Housing Instability: No                  Physical Exam     ED Triage Vitals [24 0931]   /69   Pulse 88   Resp 21   Temp 98.8 °F (37.1 °C)   Temp src Temporal   SpO2 98 %   O2 Device None (Room air)       Current Vitals:   Vital Signs  BP: 129/79  Pulse: 95  Resp: 19  Temp: 97.7 °F (36.5 °C)  Temp src: Oral  MAP (mmHg): 94    Oxygen Therapy  SpO2: 90 %  O2 Device: None (Room air)  Mode: AutoPAP  O2 Flow Rate (L/min): 0 L/min  Pulse Oximetry Type: Continuous  Oximetry Probe Site Changed: No  Pulse Ox Probe Location: Left hand        Physical Exam  General:  Vitals as listed.    HEENT: Sclerae anicteric.  Conjunctivae show no pallor.  Oropharynx clear, mucous membranes moist   Neck: supple, no rigidity   Lungs: good air exchange and clear.  Occasional coarse cough during exam.  Heart: regular rate rhythm and no murmur   Abdomen: Soft and nontender.  No abdominal masses.  No peritoneal signs   Extremities: no edema, normal peripheral pulses   Neuro: Alert oriented and nonfocal   Skin: no rashes or nodules    ED Course     Labs  Reviewed   COMP METABOLIC PANEL (14) - Abnormal; Notable for the following components:       Result Value    Glucose 121 (*)     Sodium 128 (*)     Chloride 94 (*)     Calculated Osmolality 268 (*)     ALT 8 (*)     Bilirubin, Total 2.4 (*)     All other components within normal limits   CBC WITH DIFFERENTIAL WITH PLATELET - Abnormal; Notable for the following components:    WBC 12.2 (*)     Neutrophil Absolute Prelim 9.37 (*)     Neutrophil Absolute 9.37 (*)     Lymphocyte Absolute 0.68 (*)     Monocyte Absolute 2.04 (*)     All other components within normal limits   URINALYSIS WITH CULTURE REFLEX - Abnormal; Notable for the following components:    Ketones Urine 10 (*)     All other components within normal limits   TROPONIN I HIGH SENSITIVITY - Normal   LACTIC ACID, PLASMA - Normal   SARS-COV-2/FLU A AND B/RSV BY PCR (GENEXPERT) - Normal    Narrative:     This test is intended for the qualitative detection and differentiation of SARS-CoV-2, influenza A, influenza B, and respiratory syncytial virus (RSV) viral RNA in nasopharyngeal or nares swabs from individuals suspected of respiratory viral infection consistent with COVID-19 by their healthcare provider. Signs and symptoms of respiratory viral infection due to SARS-CoV-2, influenza, and RSV can be similar.    Test performed using the Xpert Xpress SARS-CoV-2/FLU/RSV (real time RT-PCR)  assay on the [employer] instrument, [employer], [place] [address].   This test is being used under the Food and Drug Administration's Emergency Use Authorization.    The authorized Fact Sheet for Healthcare Providers for this assay is available upon request from the laboratory.   SCAN SLIDE   LEGIONELLA URINE AG SEROGRP 1   STREPTOCOCCUS PNEUMONIAE AG, URINE   RAINBOW DRAW LAVENDER   RAINBOW DRAW LIGHT GREEN   RAINBOW DRAW BLUE   RAINBOW DRAW GOLD   BLOOD CULTURE   BLOOD CULTURE   SPUTUM CULTURE     EKG    Rate, intervals and axes as noted on EKG Report.  Rate: 91  Rhythm: Sinus  Rhythm  Reading: No evidence of acute ischemia                US ABDOMEN COMPLETE (CPT=76700)    Result Date: 11/3/2024  PROCEDURE:  US ABDOMEN COMPLETE (CPT=76700)  COMPARISON:  None.  INDICATIONS:  SYNCOPE, FEVER  TECHNIQUE:  Real time gray-scale ultrasound was used to evaluate the abdomen.  The exam includes images of the liver, gallbladder, common bile duct, pancreas, spleen, kidneys, IVC, and aorta.  PATIENT STATED HISTORY: (As transcribed by Technologist)     FINDINGS:  LIVER:  Normal size and echogenicity. No significant masses. BILIARY:  Normal appearing gallbladder, intrahepatic ducts, and common bile duct.  Common bile duct diameter is 3 mm. PANCREAS:  Visualized portions appear within normal limits.  The tail is obscured by overlying gas. SPLEEN:  Normal. KIDNEYS:  Normal.  Right kidney measures 9.8 cm.  Left kidney measures 10.3 cm. AORTA/IVC:  Normal.             CONCLUSION:    The tail the pancreas is obscured by gas.  The visualized head and body appear within normal limits.  The remainder of the abdominal ultrasound survey is otherwise unremarkable.  If there is concern for pancreatic pathology then additional  CT or MRI imaging recommended.   LOCATION:  [facility]    Dictated by (CST): Esequiel Centeno MD on 11/03/2024 at 12:25 PM     Finalized by (CST): Esequiel Centeno MD on 11/03/2024 at 12:28 PM       XR CHEST PA + LAT CHEST (CPT=71046)    Result Date: 11/3/2024  PROCEDURE:  XR CHEST PA + LAT CHEST (CPT=71046)  INDICATIONS:  SYNCOPE, FEVER  COMPARISON:  [facility] , XR, XR CHEST AP PORTABLE  (CPT=71045), 7/12/2023, 2:24 PM.  TECHNIQUE:  PA and lateral chest radiographs were obtained.  PATIENT STATED HISTORY: (As transcribed by Technologist)  Per wife patient had syncope episode at home this morning.    FINDINGS:  LUNGS:  There is left lower lobe consolidation which is consistent with left lower lobe pneumonia.  This is superimposed on chronic COPD type changes in lungs. CARDIAC:  Normal size cardiac  silhouette. MEDIASTINUM:  Aorta is atherosclerotic. PLEURA:  Normal.  No pleural effusions. BONES:  Normal for age.            CONCLUSION:  There is left lower lobe consolidation consistent with pneumonia.   LOCATION:  [facility]   Dictated by (CST): Aureliano Marinelli MD on 11/03/2024 at 11:00 AM     Finalized by (CST): Aureliano Marinelli MD on 11/03/2024 at 11:01 AM             MDM      84-year-old male presents reporting fatigue over the last 2 days with a syncopal episode today.  He got out of bed, urinated, stepped up to the sink and fainted.    Additional history obtained by daughter-in-law who is a physician and reports that she thinks there could have been some sick contacts a few days ago during Halloween.  Patient's wife reports that he was very fatigued yesterday and spent pretty much the entire day in bed    Differential includes but is not limited to viral URI, bacterial pneumonia, dehydration, metabolic disturbances, a life threat.    CBC, CMP, troponin, EKG, urinalysis, chest x-ray, viral nasal swab ordered for further evaluation.    My independent interpretation of chest x-ray is that there is no large pleural effusion.    Radiology reports evidence of a left lower lobe pneumonia.    On laboratory workup the patient's bilirubin returned a bit elevated at 2.4.  Minimal leukocytosis at 12.2.  Mild hyponatremia at 128.  An ultrasound was ordered for evaluation of the elevated bilirubin.  He has no tenderness on palpation in the right upper quadrant.  Chest x-ray is showing pneumonia.  Given patient's age and syncopal episode today I recommend hospitalization for IV antibiotics and further management.  Family is agreeable.  IV Rocephin and azithromycin ordered.  Admitted to the [facility].        Admission disposition: 11/3/2024 11:41 AM           Medical Decision Making      Disposition and Plan     Clinical Impression:  1. Community acquired pneumonia of left lower lobe of lung    2. Syncope and collapse    3.  Hyponatremia    4. Hyperbilirubinemia         Disposition:  Admit  11/3/2024 11:41 am    Follow-up:  No follow-up provider specified.        Medications Prescribed:  Current Discharge Medication List              Supplementary Documentation:         Hospital Problems       Present on Admission  Date Reviewed: 10/11/2024            ICD-10-CM Noted POA    * (Principal) Community acquired pneumonia of left lower lobe of lung J18.9 11/3/2024 Unknown    Hyperbilirubinemia E80.6 11/3/2024 Unknown    Hyponatremia E87.1 7/12/2023 Unknown    Syncope and collapse R55 11/3/2024 Unknown

## 2024-11-03 NOTE — ED QUICK NOTES
Orders for admission, patient is aware of plan and ready to go upstairs. Any questions, please call ED RN Antonella at extension [phone].     Patient Covid vaccination status: Fully vaccinated     COVID Test Ordered in ED: SARS-CoV-2/Flu A and B/RSV by PCR (GeneXpert)    COVID Suspicion at Admission: N/A    Running Infusions:  0.9 bolus and first antibiotic (ceftriaxone) infusing. WILL NEED AZITHROMYCIN, SENDING UP WITH PATIENT    Mental Status/LOC at time of transport: alert and oriented x 4    Other pertinent information: calm, pleasant, cooperative, ambulatory; family at bedside  CIWA score: N/A   NIH score:  N/A

## 2024-11-03 NOTE — ED INITIAL ASSESSMENT (HPI)
Pt here with family due to a syncopal episode this morning. Per wife she heard a thud and found the pt in the bathroom on the floor. She stated that he was out of it for a few minutes but was finally able to stand on his own. Pt states that he was just standing there and went out for no reason. Pt denies any complaints other than being really tired.

## 2024-11-04 ENCOUNTER — APPOINTMENT (OUTPATIENT)
Dept: CV DIAGNOSTICS | Facility: HOSPITAL | Age: 84
End: 2024-11-04
Attending: INTERNAL MEDICINE
Payer: MEDICARE

## 2024-11-04 ENCOUNTER — APPOINTMENT (OUTPATIENT)
Dept: GENERAL RADIOLOGY | Facility: HOSPITAL | Age: 84
End: 2024-11-04
Attending: INTERNAL MEDICINE
Payer: MEDICARE

## 2024-11-04 LAB
ADENOVIRUS PCR:: NOT DETECTED
ALBUMIN SERPL-MCNC: 4.3 G/DL (ref 3.2–4.8)
ALBUMIN/GLOB SERPL: 1.8 {RATIO} (ref 1–2)
ALP LIVER SERPL-CCNC: 61 U/L
ALT SERPL-CCNC: 10 U/L
ANION GAP SERPL CALC-SCNC: 8 MMOL/L (ref 0–18)
AST SERPL-CCNC: 16 U/L (ref ?–34)
ATRIAL RATE: 91 BPM
B PARAPERT DNA SPEC QL NAA+PROBE: NOT DETECTED
B PERT DNA SPEC QL NAA+PROBE: NOT DETECTED
BASOPHILS # BLD AUTO: 0.04 X10(3) UL (ref 0–0.2)
BASOPHILS NFR BLD AUTO: 0.4 %
BILIRUB SERPL-MCNC: 1.5 MG/DL (ref 0.2–1.1)
BUN BLD-MCNC: 11 MG/DL (ref 9–23)
C PNEUM DNA SPEC QL NAA+PROBE: NOT DETECTED
CALCIUM BLD-MCNC: 9.1 MG/DL (ref 8.7–10.4)
CHLORIDE SERPL-SCNC: 99 MMOL/L (ref 98–112)
CO2 SERPL-SCNC: 24 MMOL/L (ref 21–32)
CORONAVIRUS 229E PCR:: NOT DETECTED
CORONAVIRUS HKU1 PCR:: NOT DETECTED
CORONAVIRUS NL63 PCR:: NOT DETECTED
CORONAVIRUS OC43 PCR:: NOT DETECTED
CREAT BLD-MCNC: 0.97 MG/DL
EGFRCR SERPLBLD CKD-EPI 2021: 77 ML/MIN/1.73M2 (ref 60–?)
EOSINOPHIL # BLD AUTO: 0.26 X10(3) UL (ref 0–0.7)
EOSINOPHIL NFR BLD AUTO: 2.5 %
ERYTHROCYTE [DISTWIDTH] IN BLOOD BY AUTOMATED COUNT: 13.5 %
FLUAV RNA SPEC QL NAA+PROBE: NOT DETECTED
FLUBV RNA SPEC QL NAA+PROBE: NOT DETECTED
GLOBULIN PLAS-MCNC: 2.4 G/DL (ref 2–3.5)
GLUCOSE BLD-MCNC: 101 MG/DL (ref 70–99)
HCT VFR BLD AUTO: 43.4 %
HGB BLD-MCNC: 15.1 G/DL
IMM GRANULOCYTES # BLD AUTO: 0.03 X10(3) UL (ref 0–1)
IMM GRANULOCYTES NFR BLD: 0.3 %
L PNEUMO AG UR QL: NEGATIVE
LYMPHOCYTES # BLD AUTO: 1.8 X10(3) UL (ref 1–4)
LYMPHOCYTES NFR BLD AUTO: 17.5 %
MAGNESIUM SERPL-MCNC: 1.8 MG/DL (ref 1.6–2.6)
MCH RBC QN AUTO: 32.1 PG (ref 26–34)
MCHC RBC AUTO-ENTMCNC: 34.8 G/DL (ref 31–37)
MCV RBC AUTO: 92.3 FL
METAPNEUMOVIRUS PCR:: NOT DETECTED
MONOCYTES # BLD AUTO: 1.57 X10(3) UL (ref 0.1–1)
MONOCYTES NFR BLD AUTO: 15.3 %
MYCOPLASMA PNEUMONIA PCR:: NOT DETECTED
NEUTROPHILS # BLD AUTO: 6.58 X10 (3) UL (ref 1.5–7.7)
NEUTROPHILS # BLD AUTO: 6.58 X10(3) UL (ref 1.5–7.7)
NEUTROPHILS NFR BLD AUTO: 64 %
OSMOLALITY SERPL CALC.SUM OF ELEC: 272 MOSM/KG (ref 275–295)
P AXIS: 78 DEGREES
P-R INTERVAL: 166 MS
PARAINFLUENZA 1 PCR:: NOT DETECTED
PARAINFLUENZA 2 PCR:: NOT DETECTED
PARAINFLUENZA 3 PCR:: NOT DETECTED
PARAINFLUENZA 4 PCR:: NOT DETECTED
PLATELET # BLD AUTO: 190 10(3)UL (ref 150–450)
POTASSIUM SERPL-SCNC: 3.7 MMOL/L (ref 3.5–5.1)
PROCALCITONIN SERPL-MCNC: 0.08 NG/ML (ref ?–0.05)
PROT SERPL-MCNC: 6.7 G/DL (ref 5.7–8.2)
Q-T INTERVAL: 334 MS
QRS DURATION: 92 MS
QTC CALCULATION (BEZET): 410 MS
R AXIS: -76 DEGREES
RBC # BLD AUTO: 4.7 X10(6)UL
RHINOVIRUS/ENTERO PCR:: NOT DETECTED
RSV RNA SPEC QL NAA+PROBE: NOT DETECTED
SARS-COV-2 RNA NPH QL NAA+NON-PROBE: NOT DETECTED
SODIUM SERPL-SCNC: 131 MMOL/L (ref 136–145)
STREP PNEUMO ANTIGEN, URINE: NEGATIVE
T AXIS: 73 DEGREES
TROPONIN I SERPL HS-MCNC: 8 NG/L
VENTRICULAR RATE: 91 BPM
WBC # BLD AUTO: 10.3 X10(3) UL (ref 4–11)

## 2024-11-04 PROCEDURE — 84484 ASSAY OF TROPONIN QUANT: CPT | Performed by: INTERNAL MEDICINE

## 2024-11-04 PROCEDURE — 97530 THERAPEUTIC ACTIVITIES: CPT

## 2024-11-04 PROCEDURE — 93306 TTE W/DOPPLER COMPLETE: CPT | Performed by: INTERNAL MEDICINE

## 2024-11-04 PROCEDURE — 97161 PT EVAL LOW COMPLEX 20 MIN: CPT

## 2024-11-04 PROCEDURE — 80053 COMPREHEN METABOLIC PANEL: CPT | Performed by: INTERNAL MEDICINE

## 2024-11-04 PROCEDURE — 94799 UNLISTED PULMONARY SVC/PX: CPT

## 2024-11-04 PROCEDURE — 94640 AIRWAY INHALATION TREATMENT: CPT

## 2024-11-04 PROCEDURE — 85025 COMPLETE CBC W/AUTO DIFF WBC: CPT | Performed by: INTERNAL MEDICINE

## 2024-11-04 PROCEDURE — 0202U NFCT DS 22 TRGT SARS-COV-2: CPT | Performed by: INTERNAL MEDICINE

## 2024-11-04 PROCEDURE — 83735 ASSAY OF MAGNESIUM: CPT | Performed by: INTERNAL MEDICINE

## 2024-11-04 PROCEDURE — 97535 SELF CARE MNGMENT TRAINING: CPT

## 2024-11-04 PROCEDURE — 94667 MNPJ CHEST WALL 1ST: CPT

## 2024-11-04 PROCEDURE — 92526 ORAL FUNCTION THERAPY: CPT

## 2024-11-04 PROCEDURE — 84145 PROCALCITONIN (PCT): CPT | Performed by: INTERNAL MEDICINE

## 2024-11-04 PROCEDURE — 97165 OT EVAL LOW COMPLEX 30 MIN: CPT

## 2024-11-04 PROCEDURE — 73502 X-RAY EXAM HIP UNI 2-3 VIEWS: CPT | Performed by: INTERNAL MEDICINE

## 2024-11-04 RX ORDER — POTASSIUM CHLORIDE 1500 MG/1
40 TABLET, EXTENDED RELEASE ORAL ONCE
Status: COMPLETED | OUTPATIENT
Start: 2024-11-04 | End: 2024-11-04

## 2024-11-04 RX ORDER — MAGNESIUM OXIDE 400 MG/1
400 TABLET ORAL ONCE
Status: COMPLETED | OUTPATIENT
Start: 2024-11-04 | End: 2024-11-04

## 2024-11-04 RX ORDER — CYCLOBENZAPRINE HCL 5 MG
5 TABLET ORAL 3 TIMES DAILY PRN
Status: DISCONTINUED | OUTPATIENT
Start: 2024-11-04 | End: 2024-11-06

## 2024-11-04 NOTE — SLP NOTE
ADULT SWALLOWING EVALUATION    ASSESSMENT    ASSESSMENT/OVERALL IMPRESSION:  Order received for bedside swallowing evaluation to assess safety with diet. Pt presented to [facility] from home following a syncopal episode. Pmhx includes but not limited to COPD, GERD, and HLD. Pt received VFSS from another facility in June 2024; results revealed no tracheal aspiration but laryngeal penetration following thin liquid trial that was cleared; regular consistency and thin liquids diet was recommended with use of aspiration and reflux precautions. Pt also received outpatient SLP services targeting swallow-driven exercises until 8/28/2024. CXR revealed left lower lobe consolidations consistent with pneumonia. Pt currently on a regular solids and thin liquids diet.    Pt found sitting upright in bed; alert and participatory; able to follow all commands for testing. RN present in room for beginning of evaluation and reports pt demonstrates good tolerance with current diet. Pt reports consistent generalized cough, both during and outside of po intake. Pt reports history of GERD; following reflux precautions has relieved his symptoms. Oral mech exam revealed natural and intact dentition with a clean and moist oral cavity. No oral motor deficits observed. Clear vocal quality and volitional swallow elicited. Pt self-fed po trials of thin liquids via cup and straw; puree and hard solid. Adequate retrieval and containment of bolus, complete mastication, timely oral transit, and complete clearing of the oral cavity noted. Hyolaryngeal elevation appeared to be of adequate strength when palpated. Clear vocal quality following po trials.    Pt with x1 delayed cough in between po trials that appears unrelated to po intake. Recommend regular consistency and thin liquids diet with strict use of aspiration and reflux precautions.    Following exam, discussed results, aspiration risks, diet recommendations, aspiration precautions, and plan  with patient who demonstrates good understanding. Will follow up to ensure safety with diet and educate pt on compensatory strategies/swallow precautions. Video swallow study to be completed if CXR declines, increase in clinical signs of aspiration, and/or MD desires. RN aware of plan.    RECOMMENDATIONS   Diet Recommendations - Solids: Regular  Diet Recommendations - Liquids: Thin Liquids       Compensatory Strategies Recommended: Slow rate;Alternate consistencies;Small bites and sips  Aspiration Precautions: Upright position;Slow rate;Small bites and sips  Medication Administration Recommendations: One pill at a time (whole or crushed in puree if any difficulties)  Treatment Plan/Recommendations: Dysphagia therapy;Aspiration precautions    HISTORY   MEDICAL HISTORY  Reason for Referral: R/O aspiration    Problem List  Principal Problem:    Community acquired pneumonia of left lower lobe of lung  Active Problems:    Hyponatremia    Syncope and collapse    Hyperbilirubinemia      Past Medical History  Past Medical History:    Allergic rhinitis    COPD    COPD (chronic obstructive pulmonary disease) ([facility])    Empyema    GERD    GERD (gastroesophageal reflux disease)    Heart disease    High cholesterol    OTHER DISEASES    PVD    Pancreas divisum    Pancreatitis x 1 in 5/10    Rotator cuff tear    RIGHT SHOULDER    Seronegative rheumatoid arthritis (HCC)    SLEEP APNEA    AHI 15, REM 25, amirah 88%    Spermatocele       Prior Living Situation: Home with spouse  Diet Prior to Admission: Regular;Thin liquids  Precautions: Aspiration;Reflux    Patient/Family Goals: Discharge     SWALLOWING HISTORY  Current Diet Consistency: Regular;Thin liquids  Dysphagia History: No documented history of dysphagia.  Imaging Results: CXR 11/3/2024:  CONCLUSION:  There is left lower lobe consolidation consistent with pneumonia.   US Abdomen 11/3/2024:  CONCLUSION:    The tail the pancreas is obscured by gas.  The visualized head and body  appear within normal limits.  The remainder of the abdominal ultrasound survey is otherwise unremarkable.  If there is concern for pancreatic pathology then additional    CT or MRI imaging recommended.       SUBJECTIVE       OBJECTIVE   ORAL MOTOR EXAMINATION  Dentition: Natural;Functional  Symmetry: Within Functional Limits  Strength: Within Functional Limits  Tone: Within Functional Limits  Range of Motion: Within Functional Limits  Rate of Motion: Within Functional Limits    Voice Quality: Clear  Respiratory Status: Unlabored  Consistencies Trialed: Thin liquids;Puree;Hard solid  Method of Presentation: Self presentation;Cup;Straw;Single sips;Consecutive swallows  Patient Positioning: Upright;Midline    Oral Phase of Swallow: Within Functional Limits     Pharyngeal Phase of Swallow:  (appears WFL- Pt with x1 delayed cough in between po trial presentations that appeared unrelated to po intake)      (Please note: Silent aspiration cannot be evaluated clinically. Videofluoroscopic Swallow Study is required to rule-out silent aspiration.)    Esophageal Phase of Swallow: Complaints consistent with possible esophageal involvement (Pt reports history of GERD; symptoms eased when he follows reflux precautions)  Comments:     GOALS  Goal #1 The patient will tolerate regular consistency and thin liquids without overt signs or symptoms of aspiration with 95 % accuracy over 2 session(s).  New goal   Goal #2 The patient/family/caregiver will demonstrate understanding and implementation of aspiration precautions and swallow strategies independently over 2 session(s).    New goal   Goal #3 The patient will utilize compensatory strategies as outlined by  BSSE (clinical evaluation) including Slow rate, Small bites, Small sips, Alternate liquids/solids, Upright 90 degrees with minimal assistance 95 % of the time across 2 sessions.  New goal     FOLLOW UP  Treatment Plan/Recommendations: Dysphagia therapy;Aspiration  precautions  Number of Visits to Meet Established Goals: 2  Follow Up Needed (Documentation Required): Yes  SLP Follow-up Date: 11/05/24    Thank you for your referral.   If you have any questions, please contact MOE Sweeney Student Intern

## 2024-11-04 NOTE — CONSULTS
[facility] [place]    Mohamud Banegas Patient Status:  Inpatient    1940 MRN [id number]   Location [facility] 3NE-A Attending Spike Jalloh MD   Hosp Day # 1 PCP Santana Brown MD     Date of Admission: 11/3/2024  9:22 AM  Admission Diagnosis: Syncope and collapse [R55]  Hyperbilirubinemia [E80.6]  Hyponatremia [E87.1]  Community acquired pneumonia of left lower lobe of lung [J18.9]  Reason for Consult: PNA, COPD     History of Present Illness: 83 y/o with h/o asthma/COPD, GERD, KI on CPAP who presented to ER after syncopal episode at home.  Had been experiencing progressive fatigue, fevers up to 102 and ongoing coughing which was worse than his baseline.  Pt does have a h/o dysphagia previously diagnosed as well; follows with Dr. Hernandez as OP.  On day of admission pt suffered a sudden syncopal episode and fall; no head trauma noted.  Subsquent w/u revealed a new LLL infiltrate.   - currently, pt feels a little better but still c/o significant L sided hip pain  - denies N/V/D/dysuria/hemoptysis  Past Medical History:    Allergic rhinitis    COPD    COPD (chronic obstructive pulmonary disease) (HCC)    Empyema    GERD    GERD (gastroesophageal reflux disease)    Heart disease    High cholesterol    OTHER DISEASES    PVD    Pancreas divisum    Pancreatitis x 1 in 5/10    Rotator cuff tear    RIGHT SHOULDER    Seronegative rheumatoid arthritis (HCC)    SLEEP APNEA    AHI 15, REM 25, amirah 88%    Spermatocele      Past Surgical History:   Procedure Laterality Date    Colonoscopy  > 10 years ago    Ndsc ablation & rcnstj [facility] w/o bypass      Repair ing hernia,5+y/o,reducibl  1952      Allergies[1] reviewed, per EMR     Social History:   reports that he quit smoking about 6 years ago. His smoking use included cigarettes. He started smoking about 69 years ago. He has a 63.7 pack-year smoking history. He has never used smokeless tobacco. He reports that he does not currently use alcohol. He  reports that he does not use drugs.      Family History:  Family History   Problem Relation Age of Onset    Other (Other) Father         COPD, CKD    Cancer Mother         breast         Home Medications:  Reviewed. Per EMR     Current Medications:    Current Facility-Administered Medications:     lidocaine-menthol 4-1 % patch 1 patch, 1 patch, Transdermal, Daily    cyclobenzaprine (Flexeril) tab 5 mg, 5 mg, Oral, TID PRN    enoxaparin (Lovenox) 40 MG/0.4ML SUBQ injection 40 mg, 40 mg, Subcutaneous, Daily    acetaminophen (Tylenol Extra Strength) tab 500 mg, 500 mg, Oral, Q4H PRN    azithromycin (Zithromax) tab 500 mg, 500 mg, Oral, Daily    cefTRIAXone (Rocephin) 1 g in sodium chloride 0.9% 100 mL IVPB-ADDV, 1 g, Intravenous, Q24H    albuterol (Ventolin HFA) 108 (90 Base) MCG/ACT inhaler 2 puff, 2 puff, Inhalation, Q6H PRN    fluticasone-salmeterol (Advair Diskus) 250-50 MCG/ACT inhaler 1 puff, 1 puff, Inhalation, BID    umeclidinium bromide (Incruse Ellipta) 62.5 MCG/ACT inhaler 1 puff, 1 puff, Inhalation, Daily    montelukast (Singulair) tab 10 mg, 10 mg, Oral, Daily    pantoprazole (Protonix) DR tab 40 mg, 40 mg, Oral, Daily    rosuvastatin (Crestor) tab 2.5 mg, 2.5 mg, Oral, Nightly    LORazepam (Ativan) tab 0.5 mg, 0.5 mg, Oral, Nightly PRN **OR** LORazepam (Ativan) tab 1 mg, 1 mg, Oral, Nightly PRN    cetirizine (ZyrTEC) tab 5 mg, 5 mg, Oral, Nightly     REVIEW OF SYSTEMS:   As listed in HPI, otherwise ten point ROS is negative.     OBJECTIVE:  Patient Vitals for the past 24 hrs:   BP Temp Temp src Pulse Resp SpO2   11/04/24 1130 129/77 97.4 °F (36.3 °C) Oral 81 -- 90 %   11/04/24 0749 110/68 97.6 °F (36.4 °C) Oral 120 -- 91 %   11/04/24 0748 (!) 80/62 -- -- 81 -- 91 %   11/04/24 0745 150/90 -- -- 97 -- --   11/04/24 0330 (!) 118/96 97.5 °F (36.4 °C) Oral 79 18 94 %   11/04/24 0000 145/80 97.7 °F (36.5 °C) Oral 83 18 (!) 87 %   11/03/24 2000 126/59 98.1 °F (36.7 °C) -- 89 18 93 %   11/03/24 1708 -- -- -- 95  -- 90 %   11/03/24 1600 129/79 97.7 °F (36.5 °C) Oral 84 19 (!) 86 %     O2 requirement: on room air    Wt Readings from Last 3 Encounters:   11/03/24 130 lb (59 kg)   10/11/24 132 lb (59.9 kg)   06/11/24 133 lb (60.3 kg)        I/O last 3 completed shifts:  In: 100 [IV PIGGYBACK:100]  Out: -   No intake/output data recorded.    General appearance: alert, appears stated age, and cooperative  Head: Normocephalic, without obvious abnormality, atraumatic  Neck: no adenopathy, no carotid bruit, and supple, symmetrical, trachea midline  Back: symmetric, no curvature. ROM normal. No CVA tenderness.  Lungs:  L rhales, no wheezing  Chest wall: no tenderness  Heart: regular rate and rhythm  Abdomen: soft, non-tender; bowel sounds normal; no masses,  no organomegaly  Extremities: extremities normal, atraumatic, no cyanosis or edema  Pulses: 2+ and symmetric  Skin: Skin color, texture, turgor normal. No rashes or lesions     Lab Results   Component Value Date    WBC 10.3 11/04/2024    RBC 4.70 11/04/2024    HGB 15.1 11/04/2024    HCT 43.4 11/04/2024    MCV 92.3 11/04/2024    MCH 32.1 11/04/2024    MCHC 34.8 11/04/2024    RDW 13.5 11/04/2024    .0 11/04/2024     Lab Results   Component Value Date     11/04/2024    K 3.7 11/04/2024    CL 99 11/04/2024    CO2 24.0 11/04/2024    BUN 11 11/04/2024    CREATSERUM 0.97 11/04/2024     11/04/2024    CA 9.1 11/04/2024     Lab Results   Component Value Date     11/04/2024    K 3.7 11/04/2024    CL 99 11/04/2024    CO2 24.0 11/04/2024    BUN 11 11/04/2024    CREATSERUM 0.97 11/04/2024     11/04/2024    CA 9.1 11/04/2024    ALKPHO 61 11/04/2024    ALT 10 11/04/2024    AST 16 11/04/2024    BILT 1.5 11/04/2024    ALB 4.3 11/04/2024    TP 6.7 11/04/2024     Lab Results   Component Value Date    PT 13.8 03/26/2011    INR 0.99 06/20/2021    INR 1.0 05/20/2015    INR 1.05 03/26/2011          Imaging: CXR: micronodular infiltrates, LLL infiltrate      ASSESSMENT/PLAN:  LLL PNA- likely CAP- previous swallow eval showed mild dysphagia but hx more c/w CAP  - agree with ceft/azithro- the latter was already completed  - cont with ceftriaxone as ordered  - check sputum cx, PCT, RVP  - urine legionella/strep ag negative  Asthma/COPD overlap- no signs of exac  - hold on systemic steroids  - cont singulair  - cont with advair/incruse in lieu of trelegy  - BD nebs scheduled  KI- AHI 15 on CPAP  - cont to use here if tolerated.  Dysphagia- previously very mild  - consider repeat swallow eval if he worsens  Hip pain following fall- per IM  Proph - LMWH  Dispo- Full code  - will follow    Tyrese Wilson MD  11/4/2024  2:56 PM            [1]   Allergies  Allergen Reactions    Aspirin NAUSEA ONLY     HIGHER DOSAGES. LOW DOSE ASPIRIN OK    Bactrim [Sulfamethoxazole W/Trimethoprim] RASH    Motrin Ib NAUSEA ONLY    Other UNKNOWN    Sulfamethoxazole UNKNOWN    Amlodipine RASH    Ibuprofen NAUSEA ONLY

## 2024-11-04 NOTE — PROGRESS NOTES
Pt normally takes zyrtec 10mg, the pharmacy would not approve the 10mg as ordered by the physician because according to hospital policy, the pt must be under 77 years of age to receive 10mg zyrtec. Policy requested, printed and sent. The patients kidney function is normal, but the recommendation is that patients 77 years and older receive 5mg zyrtec.

## 2024-11-04 NOTE — PROGRESS NOTES
A/O x 4  Room air, incentive spirometry. CPAP at night  Tele NSR, flipped into AFIB, then back to NSR, pt stating he is asymptomatic  Noting that several years ago, he had an ablation, and thinks he may have a history of afib but was not sure. Said he used to bring ice buckets with him when he went fishing and submerge to get his rhythm back to normal.   Denies pain  Denies dizziness  Up standby with cane  Formulary substitution for Candelario fischer is Advair+ Incruse, pt instructed to bring in his inhaler from home so he does not have to take the other ones, but is okay with taking substitution if he has to.   Pulmonology and cardiology to see formally in AM  All needs met at this time

## 2024-11-04 NOTE — OCCUPATIONAL THERAPY NOTE
OCCUPATIONAL THERAPY EVALUATION - INPATIENT     Room Number: 3612/3612-A  Evaluation Date: 11/4/2024  Type of Evaluation: Initial  Presenting Problem: Syncope and collapse; pneumonia    Physician Order: IP Consult to Occupational Therapy  Reason for Therapy: ADL/IADL Dysfunction and Discharge Planning    OCCUPATIONAL THERAPY ASSESSMENT   Patient is currently functioning near baseline with self-care and functional mobility. Prior to admission, patient's baseline is mod I.  Patient is requiring minimum assistance as a result of the following impairments: decreased functional strength, decreased functional reach, decreased endurance, and impaired standing balance. Occupational Therapy will continue to follow for duration of hospitalization.    Patient will benefit from continued skilled OT Services for duration of hospitalization, however, given the patient is functioning near baseline level do not anticipate skilled therapy needs at discharge     History Related to Current Admission: Patient is a 84 year old male admitted on 11/3/2024 with Presenting Problem: Syncope and collapse; pneumonia. Co-Morbidities : COPD GERD,HDL    Recommendations for nursing staff:   Transfers: supervision and RW  Toileting location: toilet    EVALUATION SESSION    Patient Start of Session: supine    FUNCTIONAL TRANSFER ASSESSMENT  Sit to Stand: Chair; Edge of Bed  Edge of Bed: Minimal Assist  Chair: Minimal Assist  Toilet Transfer: Minimal Assist    BED MOBILITY  Rolling: Independent  Supine to Sit : Modified Independent  Sit to Supine (OT): Modified Independent  Scooting: Mod I    BALANCE ASSESSMENT  Static Sitting: Supervision  Static Standing: Supervision    FUNCTIONAL ADL ASSESSMENT  Eating: Independent  Grooming Seated: Supervision  LB Dressing Seated: Supervision  Toileting Seated: Supervision    ACTIVITY TOLERANCE:   Pulse: 81  Heart Rate Source: Monitor                   O2 SATURATIONS       Cognition  Overall WFL    Upper  extremity  BUE AROM and strength WFL    EDUCATION PROVIDED  Patient Education : Role of Occupational Therapy; Plan of Care; Discharge Recommendations; DME Recommendations; Functional Transfer Techniques; Fall Prevention; Weight Bear Status; Proper Body Mechanics  Patient's Response to Education: Verbalized Understanding    Equipment used: RW  Demonstrates functional use, Would benefit from additional trial     Therapist comments: OT educated patient on safety,  sequencing, energy conservation, pain management, home modifications and adaptive equipment to increase independence with ADLs.  Patient's orthostatic BP checked this session: WFL (see flowsheet for details)  Patient w/ no symptoms reported throughout.  Patient c/o cramping in BLE that he reported improved with increased mobility.        Patient End of Session: Up in chair;With  staff;Needs met;Call light within reach;RN aware of session/findings;All patient questions and concerns addressed;Alarm set;Discussed recommendations with /    OCCUPATIONAL PROFILE    HOME SITUATION  Type of Home: House  Home Layout: Multi-level (Tri level)  Lives With: Spouse    Toilet and Equipment: Standard height toilet  Shower/Tub and Equipment: Walk-in shower  Other Equipment: Other (Comment) (ana, BRIE)    Occupation/Status: retired  Hand Dominance: Right  Drives: Yes  Patient Regularly Uses: Hearing aides    Prior Level of Function: Mod independent with ADLs and functional mobility with use of cane.  No falls reported other than fall from passing out that lead to this admit.     SUBJECTIVE   PAIN ASSESSMENT  Ratin  Location: denies       OBJECTIVE  Precautions: None  Fall Risk: Standard fall risk    WEIGHT BEARING RESTRICTION       ASSESSMENTS    AM-PAC '6-Clicks' Inpatient Daily Activity Short Form  -   Putting on and taking off regular lower body clothing?: A Little  -   Bathing (including washing, rinsing, drying)?: A Little  -   Toileting,  which includes using toilet, bedpan or urinal? : A Little  -   Putting on and taking off regular upper body clothing?: A Little  -   Taking care of personal grooming such as brushing teeth?: None  -   Eating meals?: None    AM-PAC Score:  Score: 20  Approx Degree of Impairment: 38.32%  Standardized Score (AM-PAC Scale): 42.03    PLAN  OT Device Recommendations: None                ADL Goals  Patient will perform toileting with supervision and AE PRN  Patient will perform LB dressing with supervision and AE PRN    Functional Transfer Goals  Patient will perform bed mobility supine to sit with supervision  Patient will perform bed mobility sit to supine with supervision  Patient will perform toilet transfer with supervision    Additional Goals:  Patient will state precautions and maintain during ADL      Patient Evaluation Complexity Level:   Occupational Profile/Medical History LOW - Brief history including review of medical or therapy records    Specific performance deficits impacting engagement in ADL/IADL LOW  1 - 3 performance deficits    Client Assessment/Performance Deficits LOW - No comorbidities nor modifications of tasks    Clinical Decision Making LOW - Analysis of occupational profile, problem-focused assessments, limited treatment options    Overall Complexity LOW     OT Session Time: 30 minutes  Self-Care Home Management: 10 minutes

## 2024-11-04 NOTE — PHYSICAL THERAPY NOTE
PHYSICAL THERAPY EVALUATION - INPATIENT     Room Number: 3612/3612-A  Evaluation Date: 11/4/2024  Type of Evaluation: Initial  Physician Order: PT Eval and Treat    Presenting Problem: Community Acquired pneumonia of L Lower Lobe of lung Syncope and collapse  Co-Morbidities : COPD GERD,HDL  Reason for Therapy: Mobility Dysfunction and Discharge Planning    PHYSICAL THERAPY ASSESSMENT   Patient is a 84 year old male admitted 11/3/2024 for Community Acquired pneumonia of L Lower Lobe of lung Syncope and collapse.  Prior to admission, patient's baseline is Mod I.  Patient is currently functioning near baseline with bed mobility, transfers, and gait.  Patient is requiring contact guard assist as a result of the following impairments: decreased endurance/aerobic capacity, pain, and decreased muscular endurance.  Physical Therapy will continue to follow for duration of hospitalization.    Patient will benefit from continued skilled PT Services at discharge to promote prior level of function.  Anticipate patient will return home with home health PT.    PLAN DURING HOSPITALIZATION  Nursing Mobility Recommendation : 1 Assist  PT Device Recommendation: Rolling walker  PT Treatment Plan: Bed mobility;Patient education;Gait training;Strengthening;Stair training;Transfer training;Balance training  Rehab Potential : Good  Frequency (Obs): 3-5x/week     CURRENT GOALS    Goal #1 Patient is able to demonstrate supine - sit EOB @ level: supervision     Goal #2 Patient is able to demonstrate transfers EOB to/from BSC at assistance level: supervision     Goal #3 Patient is able to ambulate 150 feet with assist device: walker - rolling at assistance level: supervision     Goal #4 Pt will perform 6 steps with bilateral railings with CGA.   Goal #5    Goal #6    Goal Comments: Goals established on 11/4/2024      PHYSICAL THERAPY MEDICAL/SOCIAL HISTORY  History related to current admission: Patient is a 84 year old male admitted on  11/3/2024 from Home for Syncope and collapse.  Pt diagnosed with Community Acquired pneumonia of L Lower Lobe of lung Syncope and collapse.    HOME SITUATION  Type of Home: House  Home Layout: Multi-level (Tri level)           Stairs to Bedroom: 6    Railing: Yes    Lives With: Spouse    Drives: Yes   Patient Regularly Uses: Hearing aides      Prior Level of [place]: Pt lives in a house with his spouse. Pt reports that he is IND with all ADLs. Pt has recently been using SPC but typically does not use an Assisted device. Pt has not report any history of falls.  SUBJECTIVE  \"My thighs are cramping\"    OBJECTIVE  Precautions: None  Fall Risk: Standard fall risk    WEIGHT BEARING RESTRICTION     PAIN ASSESSMENT  Rating: Unable to rate  Location: BLE quad carmps       COGNITION  Overall Cognitive Status:  WFL - within functional limits    RANGE OF MOTION AND STRENGTH ASSESSMENT  Upper extremity ROM and strength are within functional limits     Lower extremity ROM is within functional limits     Lower extremity strength is within functional limits     BALANCE  Static Sitting: Fair +  Dynamic Sitting: Fair +  Static Standing: Fair  Dynamic Standing: Fair -    ADDITIONAL TESTS                                    ACTIVITY TOLERANCE                         O2 WALK       NEUROLOGICAL FINDINGS                        AM-PAC '6-Clicks' INPATIENT SHORT FORM - BASIC MOBILITY  How much difficulty does the patient currently have...  Patient Difficulty: Turning over in bed (including adjusting bedclothes, sheets and blankets)?: A Little   Patient Difficulty: Sitting down on and standing up from a chair with arms (e.g., wheelchair, bedside commode, etc.): A Little   Patient Difficulty: Moving from lying on back to sitting on the side of the bed?: A Little   How much help from another person does the patient currently need...   Help from Another: Moving to and from a bed to a chair (including a wheelchair)?: A Little   Help from  Another: Need to walk in hospital room?: A Little   Help from Another: Climbing 3-5 steps with a railing?: A Little     AM-PAC Score:  Raw Score: 18   Approx Degree of Impairment: 46.58%   Standardized Score (AM-PAC Scale): 43.63   CMS Modifier (G-Code): CK    FUNCTIONAL ABILITY STATUS  Gait Assessment   Functional Mobility/Gait Assessment  Gait Assistance: Contact guard assist  Distance (ft): 100  Assistive Device: Rolling walker  Pattern: Shuffle    Skilled Therapy Provided     Bed Mobility:  Rolling: NT  Supine to sit: SBA   Sit to supine: NT     Transfer Mobility:  Sit to stand: CGA   Stand to sit: CGA  Gait = CGA 100ft using RW     Therapist's Comments:Attempted ambulating using the SPC however pt had increase difficulty due to pain in the Quads.  Pt was observed with a decrease in rachael when using the RW however pt reports feeling more steady with the RW.     Exercise/Education Provided:  Bed mobility  Body mechanics  Gait training  Transfer training    Patient End of Session: Up in chair;Needs met;Call light within reach;All patient questions and concerns addressed;Alarm set;Family present      Patient Evaluation Complexity Level:  History Moderate - 1 or 2 personal factors and/or co-morbidities   Examination of body systems Low -  addressing 1-2 elements   Clinical Presentation Low- Stable   Clinical Decision Making Low Complexity       PT Session Time: 23 minutes  Therapeutic Activity: 15 minutes

## 2024-11-04 NOTE — PLAN OF CARE
Assumed pt care this morning at 0730.  Pt is A&Ox4, following commands.   Pt on nasal canula, VSS.  NSR/ST  Pt denies pain.  Pt ambulates w/ SBA w/ cane or walker  Pt on cardiac diet. Tolerating diet.   R AC SL.  Ortho stats Q shift.   SPL cleared pt, added aspiration precautions.  IV abx. K+ and Mag replaced.  Na monitored. 131 @ 0800  Bed in lowest position, call light in reach.     1430- Lower leg cramping bilateral ellyn R hip. Flexoral and lidocane patach added PRN           Problem: NEUROLOGICAL - ADULT  Goal: Achieves stable or improved neurological status  Description: INTERVENTIONS  - Assess for and report changes in neurological status  - Initiate measures to prevent increased intracranial pressure  - Maintain blood pressure and fluid volume within ordered parameters to optimize cerebral perfusion and minimize risk of hemorrhage  - Monitor temperature, glucose, and sodium. Initiate appropriate interventions as ordered  Outcome: Progressing  Goal: Absence of seizures  Description: INTERVENTIONS  - Monitor for seizure activity  - Administer anti-seizure medications as ordered  - Monitor neurological status  Outcome: Progressing  Goal: Remains free of injury related to seizure activity  Description: INTERVENTIONS:  - Maintain airway, patient safety  and administer oxygen as ordered  - Monitor patient for seizure activity, document and report duration and description of seizure to MD/LIP  - If seizure occurs, turn patient to side and suction secretions as needed  - Reorient patient post seizure  - Seizure pads on all 4 side rails  - Instruct patient/family to notify RN of any seizure activity  - Instruct patient/family to call for assistance with activity based on assessment  Outcome: Progressing  Goal: Achieves maximal functionality and self care  Description: INTERVENTIONS  - Monitor swallowing and airway patency with patient fatigue and changes in neurological status  - Encourage and assist patient to  increase activity and self care with guidance from PT/OT  - Encourage visually impaired, hearing impaired and aphasic patients to use assistive/communication devices  Outcome: Progressing

## 2024-11-04 NOTE — CM/SW NOTE
PT kenyal reviewed for pt's current anticipated therapy need for DC. Pt's current anticipated therapy need is for HHC services. Per PT note:    HOME SITUATION  Type of Home: House  Home Layout: Multi-level (Tri level)  Stairs to Bedroom: 6    Railing: Yes    Lives With: Spouse    Drives: Yes   Patient Regularly Uses: Hearing aides       Prior Level of [place]: Pt lives in a house with his spouse. Pt reports that he is IND with all ADLs. Pt has recently been using SPC but typically does not use an Assisted device. Pt has not report any history of falls.    Home health referrals sent via [email or web address]. CM/SW will follow up w/ pt and family to provide HHC provider list.     Attempted to meet w/ pt to discuss DC planning, but echo in process. CM/SW will follow up at a later time.    ROSIE Pandya, CMSRN    [id number]

## 2024-11-04 NOTE — CONSULTS
Cardiology Consultation Note      Mohamud Banegas Patient Status:  Inpatient    1940 MRN [id number]   Location [facility] 3NE-A Attending Spike Jalloh MD   Hosp Day # 1 PCP Santana Brown MD     Reason for consultation:  Syncope    Impression:  Syncope with significant orthostatic hypotension   Previous history of syncope in  with normal workup including  echo and monitor which showed SVT but no bradycardia or pauses   PNA  Hyponatremia   COPD  KI  PAC's    Plan:  Repeat echo  2 week ZIO AT on discharge   Etiology appears more so related to hypovolemia given orthostatic hypotension  but given previous syncope, he will need further monitoring   Stop ARB. GOAL BP <150/90  Tele   Would prefer to keep him in the hospital today given multiple episodes of syncope over the past year.  If orthostatics are improved and he has no further symptoms and echo does not show any acute pathologies okay for discharge from a cardiac standpoint tomorrow      History of Present Illness:  Mohamud Banegas is a 84 year old male who presented to [facility] on 11/3/2024.    This is a patient of my partner: Dr. Romero .    The patient presents with an episode of syncope.  States that last night he was going to the bathroom and got up.  Went to wash his hands and all of a sudden felt lightheaded and passed out.  He was on the ground for a few minutes.  Denies any other major prodrome.  Last urine episode like this happened was but it was in the setting of taking Breo inhalers.  States that he has not been as well-hydrated over the last 2 to 3 days.  Has been taking all of his medications including valsartan.    Cardiology consultation was requested.    Medications:  Current Facility-Administered Medications   Medication Dose Route Frequency    enoxaparin (Lovenox) 40 MG/0.4ML SUBQ injection 40 mg  40 mg Subcutaneous Daily    acetaminophen (Tylenol Extra Strength) tab 500 mg  500 mg Oral Q4H PRN    azithromycin  (Zithromax) tab 500 mg  500 mg Oral Daily    cefTRIAXone (Rocephin) 1 g in sodium chloride 0.9% 100 mL IVPB-ADDV  1 g Intravenous Q24H    albuterol (Ventolin HFA) 108 (90 Base) MCG/ACT inhaler 2 puff  2 puff Inhalation Q6H PRN    fluticasone-salmeterol (Advair Diskus) 250-50 MCG/ACT inhaler 1 puff  1 puff Inhalation BID    umeclidinium bromide (Incruse Ellipta) 62.5 MCG/ACT inhaler 1 puff  1 puff Inhalation Daily    montelukast (Singulair) tab 10 mg  10 mg Oral Daily    pantoprazole (Protonix) DR tab 40 mg  40 mg Oral Daily    rosuvastatin (Crestor) tab 2.5 mg  2.5 mg Oral Nightly    losartan (Cozaar) tab 50 mg  50 mg Oral Daily    LORazepam (Ativan) tab 0.5 mg  0.5 mg Oral Nightly PRN    Or    LORazepam (Ativan) tab 1 mg  1 mg Oral Nightly PRN    cetirizine (ZyrTEC) tab 5 mg  5 mg Oral Nightly       Past Medical History:    Allergic rhinitis    COPD    COPD (chronic obstructive pulmonary disease) (HCC)    Empyema    GERD    GERD (gastroesophageal reflux disease)    Heart disease    High cholesterol    OTHER DISEASES    PVD    Pancreas divisum    Pancreatitis x 1 in 5/10    Rotator cuff tear    RIGHT SHOULDER    Seronegative rheumatoid arthritis (HCC)    SLEEP APNEA    AHI 15, REM 25, amirah 88%    Spermatocele       Past Surgical History:   Procedure Laterality Date    Colonoscopy  > 10 years ago    Ndsc ablation & rcnstj [facility] w/o bypass      Repair ing hernia,5+y/o,reducibl  01/01/1952       Family History  There is no family history of sudden cardiac death.    Social History   reports that he quit smoking about 6 years ago. His smoking use included cigarettes. He started smoking about 69 years ago. He has a 63.7 pack-year smoking history. He has never used smokeless tobacco. He reports that he does not currently use alcohol. He reports that he does not use drugs.     Allergies  Allergies[1]      Review of Systems:  Constitutional: negative for fevers  Eyes: negative for visual disturbance  Ears, nose, mouth,  throat, and face: negative for epistaxis  Respiratory: negative for dyspnea on exertion  Cardiovascular: negative for chest pain  Gastrointestinal: negative for melena  Genitourinary:negative for hematuria  Hematologic/lymphatic: negative for bleeding  Musculoskeletal:negative for myalgias  Neurological: negative for dizziness and headaches  Endocrine: negative for temperature intolerance      Physical Exam:  Blood pressure 110/68, pulse 120, temperature 97.6 °F (36.4 °C), temperature source Oral, resp. rate 18, height 5' 6\" (1.676 m), weight 130 lb (59 kg), SpO2 91%.  Temp (24hrs), Av.9 °F (36.6 °C), Min:97.5 °F (36.4 °C), Max:98.8 °F (37.1 °C)    Wt Readings from Last 3 Encounters:   24 130 lb (59 kg)   10/11/24 132 lb (59.9 kg)   24 133 lb (60.3 kg)       General: Awake and alert; in no acute distress  HEENT: Extraocular movements are intact; sclerae are anicteric; scalp is atrauamatic; no thyromegaly  Neck: Supple; no JVD; no carotid bruits  Cardiac: Regular rate and regular rhythm; no murmurs/rubs/gallops are appreciated; PMI is non-displaced; there is no evidence of a sternal heave  Lungs: Clear to auscultation bilaterally; no accessory muscle use is noted  Abdomen: Soft, non-tender; bowel sounds are normoactive; no hepatosplenomegaly  Extremities: No clubbing or cyanosis; moves all 4 extremities normally  Psychiatric: Normal mood and affect; answers questions appropriately  Dermatologic: No rashes; normal skin turgor    Diagnostic testing:    EKG: Normal sinus rhythm with PAC's    Labs:   Lab Results   Component Value Date    PT 13.8 2011    INR 0.99 2021    INR 1.0 2015        Lab Results   Component Value Date    WBC 10.3 2024    HGB 15.1 2024    HCT 43.4 2024    .0 2024    CREATSERUM 1.09 2024    BUN 14 2024     2024    K 4.0 2024    CL 94 2024    CO2 29.0 2024     2024    CA 9.1  11/03/2024    ALB 4.1 11/03/2024    ALKPHO 60 11/03/2024    BILT 2.4 11/03/2024    TP 6.3 11/03/2024    AST 13 11/03/2024    ALT 8 11/03/2024         Thank you for allowing our practice to participate in the care of your patient. Please do not hesitate to contact me if you have any questions.    Ashley Dang MD           [1]   Allergies  Allergen Reactions    Aspirin NAUSEA ONLY     HIGHER DOSAGES. LOW DOSE ASPIRIN OK    Bactrim [Sulfamethoxazole W/Trimethoprim] RASH    Motrin Ib NAUSEA ONLY    Other UNKNOWN    Sulfamethoxazole UNKNOWN    Amlodipine RASH    Ibuprofen NAUSEA ONLY

## 2024-11-04 NOTE — PROGRESS NOTES
DM Hospitalist Progress Note     PCP: Santana Brown MD    Chief Complaint: follow-up   Follow up for: The primary encounter diagnosis was Community acquired pneumonia of left lower lobe of lung. Diagnoses of Syncope and collapse, Hyponatremia, and Hyperbilirubinemia were also pertinent to this visit.    Overnight/Interim Events:      SUBJECTIVE:  No pain, +sputum. Wife wondering if not coughing since not eating. No f/c, hasn't walked. SBP 80-150s.     OBJECTIVE:  Temp:  [97.4 °F (36.3 °C)-98.1 °F (36.7 °C)] 97.4 °F (36.3 °C)  Pulse:  [] 81  Resp:  [18] 18  BP: ()/(59-96) 129/77  SpO2:  [87 %-94 %] 90 %    Intake/Output:  No intake or output data in the 24 hours ending 11/04/24 1609    Last 3 Weights   11/03/24 0945 130 lb (59 kg)   10/11/24 1259 132 lb (59.9 kg)   06/11/24 1149 133 lb (60.3 kg)       Exam    General: Alert, no distress, appears stated age.     Head:  Normocephalic, without obvious abnormality, atraumatic.   Eyes:  Sclera anicteric, EOMs intact.    Nose: Nares normal,  Mucosa normal    Throat: Lips normal   Neck: Supple, symmetrical, trachea midline   Lungs:   L base crackles    Chest wall:  No tenderness or deformity   Heart:  Regular rate and rhythm, S1, S2 normal, no murmur, rub or gallop appreciated   Abdomen:   Soft, NT/ND, Bowel sounds normal. No masses,  No organomegaly.    Extremities: Extremities normal, atraumatic, no cyanosis or LE edema.   Skin: Skin color, texture, turgor normal. No rashes or lesions.    Neurologic: Moving all extremities spontaneously, no focal deficit appreciated      Data Review:       Labs:     Recent Labs   Lab 11/03/24  0955 11/04/24  0750   WBC 12.2* 10.3   HGB 14.4 15.1   MCV 90.9 92.3   .0 190.0       Recent Labs   Lab 11/03/24  0955 11/04/24  0750   * 131*   K 4.0 3.7   CL 94* 99   CO2 29.0 24.0   BUN 14 11   CREATSERUM 1.09 0.97   CA 9.1 9.1   MG  --  1.8   * 101*       Recent Labs   Lab 11/03/24  0955 11/04/24  0750   ALT 8*  10   AST 13 16   ALB 4.1 4.3       No results for input(s): \"PGLU\" in the last 168 hours.    No results for input(s): \"TROP\" in the last 168 hours.      Meds:      lidocaine-menthol  1 patch Transdermal Daily    enoxaparin  40 mg Subcutaneous Daily    azithromycin  500 mg Oral Daily    cefTRIAXone  1 g Intravenous Q24H    fluticasone-salmeterol  1 puff Inhalation BID    umeclidinium bromide  1 puff Inhalation Daily    montelukast  10 mg Oral Daily    pantoprazole  40 mg Oral Daily    rosuvastatin  2.5 mg Oral Nightly    cetirizine  5 mg Oral Nightly         cyclobenzaprine    acetaminophen    albuterol    LORazepam **OR** LORazepam       Assessment/Plan:     84 year old male with PMH including but not limited to COPD, KI, GERD who p/t [facility] ED c syncope.      # syncope  - possibly 2/2 infection, dehydration, cardiac causes but no cp reported  - tele  - orthostatics  - PT/OT  - cards c/s   - abd ultrasound ok, UA ok    - LA ok, trop neg, follow    - Previous episode of syncope in 2023 with normal workup including echo and monitor which showed SVT but no bradycardia or pauses   - echo  - 2 week ZIO on dc     # Thigh/leg pain  - likely from fall  - prn flexeril, lidocaine  - family asking about L hip concentrated pain, will check  XR given fall      # PNA  - CXR c LLL PNA  - WBC 12.2 to 10.3, PCT 0.08  - follow temps  - blood cxs P, urine strep/legionella neg   - notify pulm as follows as o/p and was previously on abx   - SLP eval and swallow eval for possible aspiration    - IV CTX and azitrho    # HTN  - stop arb, allow higher BPs to avoid hypoTN/falls, d/w Ashley Dang MD     # Hyponatremia  - 128 to 131, IVF  - ulytes as needed  - renal c/s if persistently low      # COPD  - cont home inhalers     # GERD  -PPI, wean as o/p if appropriate     # KI  -protocol     # HL  -statin     # FEN  - SLP eval     # Proph  - lovenox     Dispo: tele  -Pt lives c wife        Dispo: tele    Questions/concerns were discussed  with patient by bedside. D/w RN. [place] hospitalist to resume care in AM, will discuss plan with them    Total Time spent with patient and coordinating care:  55 minutes    Spiek Jalloh MD  [facility] Hospitalist  [phone]  11/4/2024  4:09 PM

## 2024-11-05 PROBLEM — E80.6 HYPERBILIRUBINEMIA: Status: RESOLVED | Noted: 2024-11-03 | Resolved: 2024-11-05

## 2024-11-05 PROBLEM — E87.1 HYPONATREMIA: Status: RESOLVED | Noted: 2023-07-12 | Resolved: 2024-11-05

## 2024-11-05 PROBLEM — R55 SYNCOPE AND COLLAPSE: Status: RESOLVED | Noted: 2024-11-03 | Resolved: 2024-11-05

## 2024-11-05 LAB
ANION GAP SERPL CALC-SCNC: 4 MMOL/L (ref 0–18)
BUN BLD-MCNC: 11 MG/DL (ref 9–23)
CALCIUM BLD-MCNC: 8.8 MG/DL (ref 8.7–10.4)
CHLORIDE SERPL-SCNC: 97 MMOL/L (ref 98–112)
CO2 SERPL-SCNC: 29 MMOL/L (ref 21–32)
CREAT BLD-MCNC: 0.93 MG/DL
EGFRCR SERPLBLD CKD-EPI 2021: 80 ML/MIN/1.73M2 (ref 60–?)
ERYTHROCYTE [DISTWIDTH] IN BLOOD BY AUTOMATED COUNT: 13.2 %
GLUCOSE BLD-MCNC: 88 MG/DL (ref 70–99)
HCT VFR BLD AUTO: 37.8 %
HGB BLD-MCNC: 13.3 G/DL
MAGNESIUM SERPL-MCNC: 1.7 MG/DL (ref 1.6–2.6)
MCH RBC QN AUTO: 32.7 PG (ref 26–34)
MCHC RBC AUTO-ENTMCNC: 35.2 G/DL (ref 31–37)
MCV RBC AUTO: 92.9 FL
OSMOLALITY SERPL CALC.SUM OF ELEC: 269 MOSM/KG (ref 275–295)
PLATELET # BLD AUTO: 186 10(3)UL (ref 150–450)
POTASSIUM SERPL-SCNC: 3.7 MMOL/L (ref 3.5–5.1)
POTASSIUM SERPL-SCNC: 3.7 MMOL/L (ref 3.5–5.1)
RBC # BLD AUTO: 4.07 X10(6)UL
SODIUM SERPL-SCNC: 130 MMOL/L (ref 136–145)
WBC # BLD AUTO: 8.8 X10(3) UL (ref 4–11)

## 2024-11-05 PROCEDURE — 92526 ORAL FUNCTION THERAPY: CPT

## 2024-11-05 PROCEDURE — 85027 COMPLETE CBC AUTOMATED: CPT | Performed by: INTERNAL MEDICINE

## 2024-11-05 PROCEDURE — 84132 ASSAY OF SERUM POTASSIUM: CPT | Performed by: INTERNAL MEDICINE

## 2024-11-05 PROCEDURE — 83735 ASSAY OF MAGNESIUM: CPT | Performed by: INTERNAL MEDICINE

## 2024-11-05 PROCEDURE — 94799 UNLISTED PULMONARY SVC/PX: CPT

## 2024-11-05 PROCEDURE — 80048 BASIC METABOLIC PNL TOTAL CA: CPT | Performed by: INTERNAL MEDICINE

## 2024-11-05 RX ORDER — POTASSIUM CHLORIDE 1500 MG/1
40 TABLET, EXTENDED RELEASE ORAL ONCE
Status: COMPLETED | OUTPATIENT
Start: 2024-11-05 | End: 2024-11-05

## 2024-11-05 RX ORDER — CYCLOBENZAPRINE HCL 5 MG
5 TABLET ORAL 3 TIMES DAILY PRN
Qty: 10 TABLET | Refills: 0 | Status: SHIPPED | OUTPATIENT
Start: 2024-11-05 | End: 2024-11-08

## 2024-11-05 RX ORDER — CLOTRIMAZOLE 10 MG/1
1 LOZENGE ORAL EVERY 4 HOURS PRN
Status: DISCONTINUED | OUTPATIENT
Start: 2024-11-05 | End: 2024-11-05

## 2024-11-05 RX ORDER — MAGNESIUM OXIDE 400 MG/1
400 TABLET ORAL ONCE
Status: COMPLETED | OUTPATIENT
Start: 2024-11-05 | End: 2024-11-05

## 2024-11-05 NOTE — SLP NOTE
SPEECH DAILY NOTE - INPATIENT    ASSESSMENT & PLAN   ASSESSMENT  Pt seen for dysphagia tx to assess tolerance with recommended diet, ensure appropriate utilization of aspiration precautions and provide pt/family education. Pt found sitting upright in bedside chair; alert and participatory. Family at bedside aided in history; reports patient with a generalized cough both during and outside of po intake. Observed pt self-feed po trials of thin liquids via straw and hard solid. Independent utilization of aspiration precautions with no clinical s/s of aspiration noted. Provided pt/family education on aspiration risks, aspiration precautions, and compensatory strategies; highlighted the importance of continued and consistent use of aspiration precautions. Good understanding from pt/family verbalized.    Pt demonstrates safe and efficient po intake with regular solids and thin liquids; displays a good understanding of aspiration precautions; no further inpatient dysphagia services warranted.    Diet Recommendations - Solids: Regular  Diet Recommendations - Liquids: Thin Liquids    Compensatory Strategies Recommended: Slow rate;Alternate consistencies;Small bites and sips  Aspiration Precautions: Upright position;Slow rate;Small bites and sips  Medication Administration Recommendations: One pill at a time (whole or crushed in puree if any difficulties)    Patient Experiencing Pain: No        Treatment Plan  Treatment Plan/Recommendations: No further inpatient SLP service warranted    Interdisciplinary Communication: Discussed with family at bedside        GOALS  Goal #1 The patient will tolerate regular consistency and thin liquids without overt signs or symptoms of aspiration with 95 % accuracy over 2 session(s).  MET   Goal #2 The patient/family/caregiver will demonstrate understanding and implementation of aspiration precautions and swallow strategies independently over 2 session(s).     MET   Goal #3 The patient will  utilize compensatory strategies as outlined by  BSSE (clinical evaluation) including Slow rate, Small bites, Small sips, Alternate liquids/solids, Upright 90 degrees with minimal assistance 95 % of the time across 2 sessions.  MET     FOLLOW UP  Follow Up Needed (Documentation Required): No    Session: 1 following BSSE    If you have any questions, please contact Ivonne AHUMADA SLP Student Intern

## 2024-11-05 NOTE — PROGRESS NOTES
11/05/24 0924 11/05/24 0926 11/05/24 0928   Vitals   Pulse 90 98 97   /65 136/73 121/89   MAP (mmHg) 85 92 98   BP Location Left arm Right arm Right arm   BP Method Automatic Automatic Automatic   Patient Position Lying Sitting Standing       Orthostatic BP, R ARM BP for lying position.

## 2024-11-05 NOTE — PLAN OF CARE
Assumed patient care at 1930  Pt oriented x 4   Sinus/sinus tach on tele  Room air during the day, c-pap at night  IV antibiotics per e-mar   X-ray completed   Lidoderm patch/prn tylenol for pain   Safety precautions in place  Call light is within reach  Will continue poc       Problem: RESPIRATORY - ADULT  Goal: Achieves optimal ventilation and oxygenation  Description: INTERVENTIONS:  - Assess for changes in respiratory status  - Assess for changes in mentation and behavior  - Position to facilitate oxygenation and minimize respiratory effort  - Oxygen supplementation based on oxygen saturation or ABGs  - Provide Smoking Cessation handout, if applicable  - Encourage broncho-pulmonary hygiene including cough, deep breathe, Incentive Spirometry  - Assess the need for suctioning and perform as needed  - Assess and instruct to report SOB or any respiratory difficulty  - Respiratory Therapy support as indicated  - Manage/alleviate anxiety  - Monitor for signs/symptoms of CO2 retention  Outcome: Progressing     Problem: METABOLIC/FLUID AND ELECTROLYTES - ADULT  Goal: Electrolytes maintained within normal limits  Description: INTERVENTIONS:  - Monitor labs and rhythm and assess patient for signs and symptoms of electrolyte imbalances  - Administer electrolyte replacement as ordered  - Monitor response to electrolyte replacements, including rhythm and repeat lab results as appropriate  - Fluid restriction as ordered  - Instruct patient on fluid and nutrition restrictions as appropriate  Outcome: Progressing     Problem: MUSCULOSKELETAL - ADULT  Goal: Return mobility to safest level of function  Description: INTERVENTIONS:  - Assess patient stability and activity tolerance for standing, transferring and ambulating w/ or w/o assistive devices  - Assist with transfers and ambulation using safe patient handling equipment as needed  - Ensure adequate protection for wounds/incisions during mobilization  - Obtain PT/OT consults  as needed  - Advance activity as appropriate  - Communicate ordered activity level and limitations with patient/family  Outcome: Progressing  Goal: Maintain proper alignment of affected body part  Description: INTERVENTIONS:  - Support and protect limb and body alignment per provider's orders  - Instruct and reinforce with patient and family use of appropriate assistive device and precautions (e.g. spinal or hip dislocation precautions)  Outcome: Progressing

## 2024-11-05 NOTE — DISCHARGE INSTRUCTIONS
Sometimes managing your health at home requires assistance.  The [facility] team has recognized your preference to use [facility].  They can be reached by phone at [phone].  The fax number for your reference is [phone].  A representative from the home health agency will contact you or your family to schedule your first visit.       Please stop valsartan for now. Take daily blood pressures and bring to your visit with Dr. Romero. Call us is pressures are greater than 150/90 consistently  Ashley Dang MD    It was recommended that you use oxygen at home to facilitate your recovery and compliment your treatment.  The [facility] team has set up delivery with [facility].  Contact information: [place] [address] [address].  Phone: [phone]..      If you experienced any difficulties with the delivery, please contact the [facility] department at [phone].

## 2024-11-05 NOTE — PLAN OF CARE
Assumed pt care this mornign at 0730.   Pt is A&OX4. Wears hearing aids.   On room air, lungs sounds clear. CPAP at night. VSS  Tele: NSR with PVCs.  Lozenges provided for chronic cough.   Possible discharge today, needs cards clearance.   Cardiac diet, tolerating well.   SBA with cane.   Bed in lowest position, call light within reach.  Ortho BP completed, hospitalist aware.   R AC PIV saline lock.   IV abx switched to PO abx.    Family present at bedside.     Problem: NEUROLOGICAL - ADULT  Goal: Achieves stable or improved neurological status  Description: INTERVENTIONS  - Assess for and report changes in neurological status  - Initiate measures to prevent increased intracranial pressure  - Maintain blood pressure and fluid volume within ordered parameters to optimize cerebral perfusion and minimize risk of hemorrhage  - Monitor temperature, glucose, and sodium. Initiate appropriate interventions as ordered  Outcome: Progressing  Goal: Absence of seizures  Description: INTERVENTIONS  - Monitor for seizure activity  - Administer anti-seizure medications as ordered  - Monitor neurological status  Outcome: Progressing  Goal: Remains free of injury related to seizure activity  Description: INTERVENTIONS:  - Maintain airway, patient safety  and administer oxygen as ordered  - Monitor patient for seizure activity, document and report duration and description of seizure to MD/LIP  - If seizure occurs, turn patient to side and suction secretions as needed  - Reorient patient post seizure  - Seizure pads on all 4 side rails  - Instruct patient/family to notify RN of any seizure activity  - Instruct patient/family to call for assistance with activity based on assessment  Outcome: Progressing  Goal: Achieves maximal functionality and self care  Description: INTERVENTIONS  - Monitor swallowing and airway patency with patient fatigue and changes in neurological status  - Encourage and assist patient to increase activity and  self care with guidance from PT/OT  - Encourage visually impaired, hearing impaired and aphasic patients to use assistive/communication devices  Outcome: Progressing     Problem: RESPIRATORY - ADULT  Goal: Achieves optimal ventilation and oxygenation  Description: INTERVENTIONS:  - Assess for changes in respiratory status  - Assess for changes in mentation and behavior  - Position to facilitate oxygenation and minimize respiratory effort  - Oxygen supplementation based on oxygen saturation or ABGs  - Provide Smoking Cessation handout, if applicable  - Encourage broncho-pulmonary hygiene including cough, deep breathe, Incentive Spirometry  - Assess the need for suctioning and perform as needed  - Assess and instruct to report SOB or any respiratory difficulty  - Respiratory Therapy support as indicated  - Manage/alleviate anxiety  - Monitor for signs/symptoms of CO2 retention  Outcome: Progressing     Problem: METABOLIC/FLUID AND ELECTROLYTES - ADULT  Goal: Electrolytes maintained within normal limits  Description: INTERVENTIONS:  - Monitor labs and rhythm and assess patient for signs and symptoms of electrolyte imbalances  - Administer electrolyte replacement as ordered  - Monitor response to electrolyte replacements, including rhythm and repeat lab results as appropriate  - Fluid restriction as ordered  - Instruct patient on fluid and nutrition restrictions as appropriate  Outcome: Progressing     Problem: MUSCULOSKELETAL - ADULT  Goal: Return mobility to safest level of function  Description: INTERVENTIONS:  - Assess patient stability and activity tolerance for standing, transferring and ambulating w/ or w/o assistive devices  - Assist with transfers and ambulation using safe patient handling equipment as needed  - Ensure adequate protection for wounds/incisions during mobilization  - Obtain PT/OT consults as needed  - Advance activity as appropriate  - Communicate ordered activity level and limitations with  patient/family  Outcome: Progressing  Goal: Maintain proper alignment of affected body part  Description: INTERVENTIONS:  - Support and protect limb and body alignment per provider's orders  - Instruct and reinforce with patient and family use of appropriate assistive device and precautions (e.g. spinal or hip dislocation precautions)  Outcome: Progressing

## 2024-11-05 NOTE — PROGRESS NOTES
Cardiology Progress Note    Mohamud Banegas Patient Status:  Inpatient    1940 MRN [id number]   Location [facility] 3NE-A Attending Ryan Ruiz, DO   Hosp Day # 2 PCP Santana Brown MD     Impression:  Syncope with significant orthostatic hypotension   Previous history of syncope in  with normal workup including  echo and monitor which showed SVT but no bradycardia or pauses   PNA  Moderate aortic stenosis   Hyponatremia: improved  COPD  KI  PAC's     Plan:  Repeat echo shows moderate aortic stenosis. However, this is based on Vmax of 3.0. His murmur is soft and Echo was done in the febrile state which may have falsely increased his gradients. Would recommend repeating in 3-6 months   2 week ZIO AT on discharge   Etiology appears more so related to hypovolemia given orthostatic hypotension, recent fever and poor Po intake. He is also hypoxic to 87% today with activity. Although orthostatic numbers are improved, would recommend an additional night of admission.   Stop ARB. GOAL BP <150/90  Tele       Subjective:  The patient denies any chest pain or dyspnea at this time.    Objective:  /71 (BP Location: Left arm)   Pulse 88   Temp 97.6 °F (36.4 °C) (Oral)   Resp 20   Ht 5' 6\" (1.676 m)   Wt 130 lb (59 kg)   SpO2 97%   BMI 20.98 kg/m²   Temp (24hrs), Av.6 °F (36.4 °C), Min:97.4 °F (36.3 °C), Max:97.9 °F (36.6 °C)      Intake/Output Summary (Last 24 hours) at 2024 1417  Last data filed at 2024 0614  Gross per 24 hour   Intake --   Output 650 ml   Net -650 ml     Wt Readings from Last 3 Encounters:   24 130 lb (59 kg)   10/11/24 132 lb (59.9 kg)   24 133 lb (60.3 kg)       General: Awake and alert; in no acute distress  Cardiac: 2/6 systolic murmur   Lungs: Clear to auscultation bilaterally; no accessory muscle use  Abdomen: Soft, non-tender; bowel sounds are normoactive  Extremities: No clubbing/cyanosis; moves all 4 extremities normally    Current  Facility-Administered Medications   Medication Dose Route Frequency    amoxicillin clavulanate (Augmentin) 875-125 MG per tab 875 mg  875 mg Oral Q12H    benzocaine-menthol (Cepacol) lozenge 1 lozenge  1 lozenge Oral PRN    lidocaine-menthol 4-1 % patch 1 patch  1 patch Transdermal Daily    cyclobenzaprine (Flexeril) tab 5 mg  5 mg Oral TID PRN    influenza virus trivalent high dose PF (Fluzone HD) 0.5 mL injection (ages >/= 65 years) 0.5 mL  0.5 mL Intramuscular Prior to discharge    enoxaparin (Lovenox) 40 MG/0.4ML SUBQ injection 40 mg  40 mg Subcutaneous Daily    acetaminophen (Tylenol Extra Strength) tab 500 mg  500 mg Oral Q4H PRN    albuterol (Ventolin HFA) 108 (90 Base) MCG/ACT inhaler 2 puff  2 puff Inhalation Q6H PRN    fluticasone-salmeterol (Advair Diskus) 250-50 MCG/ACT inhaler 1 puff  1 puff Inhalation BID    umeclidinium bromide (Incruse Ellipta) 62.5 MCG/ACT inhaler 1 puff  1 puff Inhalation Daily    montelukast (Singulair) tab 10 mg  10 mg Oral Daily    pantoprazole (Protonix) DR tab 40 mg  40 mg Oral Daily    rosuvastatin (Crestor) tab 2.5 mg  2.5 mg Oral Nightly    LORazepam (Ativan) tab 0.5 mg  0.5 mg Oral Nightly PRN    Or    LORazepam (Ativan) tab 1 mg  1 mg Oral Nightly PRN    cetirizine (ZyrTEC) tab 5 mg  5 mg Oral Nightly       Laboratory Data:  Lab Results   Component Value Date    WBC 8.8 11/05/2024    HGB 13.3 11/05/2024    HCT 37.8 11/05/2024    .0 11/05/2024     Lab Results   Component Value Date    INR 0.99 06/20/2021    INR 1.0 05/20/2015    INR 1.05 03/26/2011     Lab Results   Component Value Date     11/05/2024    K 3.7 11/05/2024    K 3.7 11/05/2024    CL 97 11/05/2024    CO2 29.0 11/05/2024    BUN 11 11/05/2024    CREATSERUM 0.93 11/05/2024    GLU 88 11/05/2024    CA 8.8 11/05/2024    MG 1.7 11/05/2024       Telemetry: No malignant tachyarrhythmias or bradyarrhythmias      Thank you for allowing our practice to participate in the care of your patient. Please do not  hesitate to contact me if you have any questions.    Ashley Dang MD

## 2024-11-05 NOTE — PROGRESS NOTES
[facility]    Mohamud Banegas Patient Status:  Inpatient    1940 MRN [id number]   Location [facility] 3NE-A Attending Ryan Ruiz, DO   Hosp Day # 2 PCP Santana Brown MD     SUBJECTIVE:  generally doing better but still has cough     OBJECTIVE:  /71 (BP Location: Left arm)   Pulse 88   Temp 97.6 °F (36.4 °C) (Oral)   Resp 20   Ht 167.6 cm (5' 6\")   Wt 130 lb (59 kg)   SpO2 97%   BMI 20.98 kg/m²   O2 requirement: on room air     I/O last 3 completed shifts:  In: -   Out: 1050 [Urine:1050]  No intake/output data recorded.     Current Medications:   Current Facility-Administered Medications:     amoxicillin clavulanate (Augmentin) 875-125 MG per tab 875 mg, 875 mg, Oral, Q12H    benzocaine-menthol (Cepacol) lozenge 1 lozenge, 1 lozenge, Oral, PRN    lidocaine-menthol 4-1 % patch 1 patch, 1 patch, Transdermal, Daily    cyclobenzaprine (Flexeril) tab 5 mg, 5 mg, Oral, TID PRN    influenza virus trivalent high dose PF (Fluzone HD) 0.5 mL injection (ages >/= 65 years) 0.5 mL, 0.5 mL, Intramuscular, Prior to discharge    enoxaparin (Lovenox) 40 MG/0.4ML SUBQ injection 40 mg, 40 mg, Subcutaneous, Daily    acetaminophen (Tylenol Extra Strength) tab 500 mg, 500 mg, Oral, Q4H PRN    albuterol (Ventolin HFA) 108 (90 Base) MCG/ACT inhaler 2 puff, 2 puff, Inhalation, Q6H PRN    fluticasone-salmeterol (Advair Diskus) 250-50 MCG/ACT inhaler 1 puff, 1 puff, Inhalation, BID    umeclidinium bromide (Incruse Ellipta) 62.5 MCG/ACT inhaler 1 puff, 1 puff, Inhalation, Daily    montelukast (Singulair) tab 10 mg, 10 mg, Oral, Daily    pantoprazole (Protonix) DR tab 40 mg, 40 mg, Oral, Daily    rosuvastatin (Crestor) tab 2.5 mg, 2.5 mg, Oral, Nightly    LORazepam (Ativan) tab 0.5 mg, 0.5 mg, Oral, Nightly PRN **OR** LORazepam (Ativan) tab 1 mg, 1 mg, Oral, Nightly PRN    cetirizine (ZyrTEC) tab 5 mg, 5 mg, Oral, Nightly     General appearance: alert, appears stated age, and cooperative  Lungs: diminished breath  sounds bilaterally  Heart: regular rate and rhythm  Abdomen: soft, non-tender; bowel sounds normal; no masses,  no organomegaly  Extremities: extremities normal, atraumatic, no cyanosis or edema     Lab Results   Component Value Date    WBC 8.8 11/05/2024    RBC 4.07 11/05/2024    HGB 13.3 11/05/2024    HCT 37.8 11/05/2024    MCV 92.9 11/05/2024    MCH 32.7 11/05/2024    MCHC 35.2 11/05/2024    RDW 13.2 11/05/2024    .0 11/05/2024     Lab Results   Component Value Date     11/05/2024    K 3.7 11/05/2024    K 3.7 11/05/2024    CL 97 11/05/2024    CO2 29.0 11/05/2024    BUN 11 11/05/2024    CREATSERUM 0.93 11/05/2024    GLU 88 11/05/2024    CA 8.8 11/05/2024     Lab Results   Component Value Date    PT 13.8 03/26/2011    INR 0.99 06/20/2021    INR 1.0 05/20/2015    INR 1.05 03/26/2011          Imaging: CXR: micronodular infiltrates, LLL infiltrate     ASSESSMENT/PLAN:  LLL PNA- likely CAP- previous swallow eval showed mild dysphagia but hx more c/w CAP  - agree with ceft/azithro- the latter was already completed  - cont with ceftriaxone as ordered  - repeat sputum cx pending, PCT neg, RVP neg, previous sputum cx with normal karen  - urine legionella/strep ag negative  Asthma/COPD overlap- no signs of exac  - hold on systemic steroids  - cont singulair  - cont with advair/incruse in lieu of trelegy  - BD nebs scheduled  KI- AHI 15 on CPAP  - cont to use here if tolerated.  Dysphagia- previously very mild  - consider repeat swallow eval if he worsens  Hip pain following fall- per IM  Proph - LMWH  Dispo- Full code  - will follow  - dw family    Tyrese Wilson MD  11/5/2024  2:15 PM

## 2024-11-05 NOTE — CM/SW NOTE
Spoke with pt and his family about HH.  HH list given to family.  They have chosen [facility].  [facility] reserved.  Cardiology will keep pt one more night.

## 2024-11-06 VITALS
RESPIRATION RATE: 18 BRPM | TEMPERATURE: 98 F | WEIGHT: 130 LBS | HEIGHT: 66 IN | DIASTOLIC BLOOD PRESSURE: 77 MMHG | OXYGEN SATURATION: 94 % | HEART RATE: 82 BPM | BODY MASS INDEX: 20.89 KG/M2 | SYSTOLIC BLOOD PRESSURE: 137 MMHG

## 2024-11-06 LAB
ALBUMIN SERPL-MCNC: 3.9 G/DL (ref 3.2–4.8)
ALBUMIN/GLOB SERPL: 1.8 {RATIO} (ref 1–2)
ALP LIVER SERPL-CCNC: 49 U/L
ALT SERPL-CCNC: 12 U/L
ANION GAP SERPL CALC-SCNC: 6 MMOL/L (ref 0–18)
AST SERPL-CCNC: 16 U/L (ref ?–34)
BILIRUB SERPL-MCNC: 0.6 MG/DL (ref 0.2–1.1)
BUN BLD-MCNC: 14 MG/DL (ref 9–23)
CALCIUM BLD-MCNC: 9.2 MG/DL (ref 8.7–10.4)
CHLORIDE SERPL-SCNC: 98 MMOL/L (ref 98–112)
CO2 SERPL-SCNC: 28 MMOL/L (ref 21–32)
CREAT BLD-MCNC: 0.9 MG/DL
EGFRCR SERPLBLD CKD-EPI 2021: 84 ML/MIN/1.73M2 (ref 60–?)
GLOBULIN PLAS-MCNC: 2.2 G/DL (ref 2–3.5)
GLUCOSE BLD-MCNC: 103 MG/DL (ref 70–99)
MAGNESIUM SERPL-MCNC: 1.8 MG/DL (ref 1.6–2.6)
OSMOLALITY SERPL CALC.SUM OF ELEC: 275 MOSM/KG (ref 275–295)
POTASSIUM SERPL-SCNC: 4.3 MMOL/L (ref 3.5–5.1)
POTASSIUM SERPL-SCNC: 4.3 MMOL/L (ref 3.5–5.1)
PROT SERPL-MCNC: 6.1 G/DL (ref 5.7–8.2)
SODIUM SERPL-SCNC: 132 MMOL/L (ref 136–145)

## 2024-11-06 PROCEDURE — 80053 COMPREHEN METABOLIC PANEL: CPT | Performed by: INTERNAL MEDICINE

## 2024-11-06 PROCEDURE — 83735 ASSAY OF MAGNESIUM: CPT | Performed by: INTERNAL MEDICINE

## 2024-11-06 PROCEDURE — 97530 THERAPEUTIC ACTIVITIES: CPT

## 2024-11-06 PROCEDURE — 84132 ASSAY OF SERUM POTASSIUM: CPT | Performed by: INTERNAL MEDICINE

## 2024-11-06 NOTE — PROGRESS NOTES
11/06/24 0524 11/06/24 0525 11/06/24 0526   Vitals   Temp 97.8 °F (36.6 °C)  --   --    Temp src Oral  --   --    Pulse 72  --  91   Heart Rate Source Monitor  --   --    Resp 18  --   --    /71 123/70 120/73   MAP (mmHg) 86 87 87   BP Location Left arm Left arm Left arm   BP Method Automatic Automatic Automatic   Patient Position Lying Sitting Standing

## 2024-11-06 NOTE — PROGRESS NOTES
[facility]   part of [facility] Hospitalist Progress Note     Mohamud Banegas Patient Status:  Inpatient    1940 MRN [id number]   Location [facility] 3NE-A Attending Ryan Ruiz,    Hosp Day # 2 PCP Santana Brown MD     Assessment & Plan:    85 y/o M w/ PMHx of COPD, KI who presented to the hospital w/ syncope. Found to have LLL PNA.    CAP  Hx of COPD  Hx of KI  Plan:  - Switch to Augmentin, finish Azithro course, will treat for 5 days for now  - Pulm consulted  - Continue inhalers, no indication for steroids at this time  - Continue CPAP at night  - Walk test in the am    Syncope  New Dx of Moderate Aortic Stenosis  HTN  ECHO noted  Plan:  - Cardiology recommending additional night observation  - LR bolus given again today  - Repeat orthostats in the am  - Ziopatch on discharge  - Losartan stopped on discharge    Dysphagia  Plan:  - SLP consulted    Leg Pain  XR negative for fracture  Plan:  - PT/OT    Hyponatremia, chronic  Plan:  - Monitor w/ daily labs    HLD  - statin    DVT Ppx: Lovenox    Subjective:     Patient seen and examined this morning at bedside. Feeling well, still slightly short of breath. Orthostatics reviewed.    Vital signs:  Temp:  [97.4 °F (36.3 °C)-98.2 °F (36.8 °C)] 98.2 °F (36.8 °C)  Pulse:  [71-98] 97  Resp:  [17-20] 18  BP: (120-141)/(53-89) 127/65  SpO2:  [91 %-98 %] 95 %    Physical Exam:    General: No acute distress.   Respiratory: Clear to auscultation bilaterally. No wheezes.  Cardiovascular: S1, S2. Regular rate and rhythm  Abdomen: Soft, nontender, nondistended.  Positive bowel sounds. No rebound or guarding.  Extremities: No edema.  Neuro:  Grossly non focal, no motor deficits.      Diagnostic Data:    Pertinent Labs:  Reviewed    Ryan Ruiz D.O.  [facility] Hospitalist

## 2024-11-06 NOTE — HOME CARE LIAISON
[facility] Spoke w/ patient who is agreeable with [facility]. Contact information placed on AVS.

## 2024-11-06 NOTE — PHYSICAL THERAPY NOTE
PHYSICAL THERAPY TREATMENT NOTE - INPATIENT    Room Number: 3612/3612-A     Session: 1     Number of Visits to Meet Established Goals: 3    Presenting Problem: Community Acquired pneumonia of L Lower Lobe of lung Syncope and collapse  Co-Morbidities : COPD GERD,HDL    PHYSICAL THERAPY ASSESSMENT   Patient demonstrates excellent progress this session, goals  remain in progress.      At this time pt has met majority of his goals and is near baseline. Pt  does not need for future follow up for PT.    Patient continues to benefit from continued skilled PT services: at discharge to promote prior level of function.  Anticipate patient will return home with home health PT.    PLAN DURING HOSPITALIZATION  Nursing Mobility Recommendation : 1 Assist  PT Device Recommendation: Rolling walker  PT Treatment Plan: Bed mobility;Patient education;Gait training;Strengthening;Stair training;Transfer training;Balance training  Frequency (Obs): 3-5x/week     CURRENT GOALS       Goal #1 Patient is able to demonstrate supine - sit EOB @ level: supervision-MET       Goal #2 Patient is able to demonstrate transfers EOB to/from BSC at assistance level: supervision-MET       Goal #3 Patient is able to ambulate 150 feet with assist device: walker - rolling at assistance level: supervision   Goal #4 Pt will perform 6 steps with bilateral railings with CGA.-MET    Goal #5     Goal #6     Goal Comments: Goals established on 2024 all goals ongoing    SUBJECTIVE  \"I feel better\"    OBJECTIVE  Precautions: None    WEIGHT BEARING RESTRICTION     PAIN ASSESSMENT   Ratin  Location: Pt reports no pain       BALANCE                                                                                                                       Static Sitting: Fair +  Dynamic Sitting: Fair +           Static Standing: Fair  Dynamic Standing: Fair    ACTIVITY TOLERANCE                         O2 WALK       AM-PAC '6-Clicks' INPATIENT  SHORT FORM - BASIC MOBILITY  How much difficulty does the patient currently have...  Patient Difficulty: Turning over in bed (including adjusting bedclothes, sheets and blankets)?: A Little   Patient Difficulty: Sitting down on and standing up from a chair with arms (e.g., wheelchair, bedside commode, etc.): A Little   Patient Difficulty: Moving from lying on back to sitting on the side of the bed?: A Little   How much help from another person does the patient currently need...   Help from Another: Moving to and from a bed to a chair (including a wheelchair)?: A Little   Help from Another: Need to walk in hospital room?: A Little   Help from Another: Climbing 3-5 steps with a railing?: A Little     AM-PAC Score:  Raw Score: 18   Approx Degree of Impairment: 46.58%   Standardized Score (AM-PAC Scale): 43.63   CMS Modifier (G-Code): CK    FUNCTIONAL ABILITY STATUS  Gait Assessment   Functional Mobility/Gait Assessment  Gait Assistance: Modified independent  Distance (ft): 70,20  Assistive Device: Rolling walker;None  Pattern: Within Functional Limits    Skilled Therapy Provided    Bed Mobility:  Rolling: NT   Supine<>Sit: NT   Sit<>Supine: NT     Transfer Mobility:  Sit<>Stand: Mod I   Stand<>Sit: Mod i   Gait: Sup 70ft using RW, 20ft no assisted device    Therapist's Comments: Pt was seen in his room ad-maya. Pt reports that he feels that he is back at his baseline. Educated pt on the benefits of the RW when ambulating. Pt understood.        Patient End of Session: In bed;Needs met;Call light within reach;RN aware of session/findings;All patient questions and concerns addressed;Alarm set    PT Session Time: 10 minutes  Therapeutic Activity: 10 minutes

## 2024-11-06 NOTE — PLAN OF CARE
Pt discharged home with home O2 se up and home health. H left the unit via wheelchair accompanied by family to stop at cardiac office and get his device. Prescriptions information and discharge instrutions given with pt verbalizing understanding.

## 2024-11-06 NOTE — PLAN OF CARE
A/O x 4  Glasses and hearing aids  Room air, , IS. CPAP at night  Tele NSR, no afib episodes noted, ZIO on DC poss Wed  Orthostatics Q shift  Mag and K replaced during the day and recheck AM  Aspiration precaution  Lozenges prn  Cmp tomorrow trend sodium  Tylenol PO PRN for hip pain   DC home  All needs met    Problem: NEUROLOGICAL - ADULT  Goal: Achieves stable or improved neurological status  Description: INTERVENTIONS  - Assess for and report changes in neurological status  - Initiate measures to prevent increased intracranial pressure  - Maintain blood pressure and fluid volume within ordered parameters to optimize cerebral perfusion and minimize risk of hemorrhage  - Monitor temperature, glucose, and sodium. Initiate appropriate interventions as ordered  Outcome: Progressing  Goal: Absence of seizures  Description: INTERVENTIONS  - Monitor for seizure activity  - Administer anti-seizure medications as ordered  - Monitor neurological status  Outcome: Progressing  Goal: Remains free of injury related to seizure activity  Description: INTERVENTIONS:  - Maintain airway, patient safety  and administer oxygen as ordered  - Monitor patient for seizure activity, document and report duration and description of seizure to MD/LIP  - If seizure occurs, turn patient to side and suction secretions as needed  - Reorient patient post seizure  - Seizure pads on all 4 side rails  - Instruct patient/family to notify RN of any seizure activity  - Instruct patient/family to call for assistance with activity based on assessment  Outcome: Progressing  Goal: Achieves maximal functionality and self care  Description: INTERVENTIONS  - Monitor swallowing and airway patency with patient fatigue and changes in neurological status  - Encourage and assist patient to increase activity and self care with guidance from PT/OT  - Encourage visually impaired, hearing impaired and aphasic patients to use assistive/communication devices  Outcome:  Progressing     Problem: RESPIRATORY - ADULT  Goal: Achieves optimal ventilation and oxygenation  Description: INTERVENTIONS:  - Assess for changes in respiratory status  - Assess for changes in mentation and behavior  - Position to facilitate oxygenation and minimize respiratory effort  - Oxygen supplementation based on oxygen saturation or ABGs  - Provide Smoking Cessation handout, if applicable  - Encourage broncho-pulmonary hygiene including cough, deep breathe, Incentive Spirometry  - Assess the need for suctioning and perform as needed  - Assess and instruct to report SOB or any respiratory difficulty  - Respiratory Therapy support as indicated  - Manage/alleviate anxiety  - Monitor for signs/symptoms of CO2 retention  Outcome: Progressing     Problem: METABOLIC/FLUID AND ELECTROLYTES - ADULT  Goal: Electrolytes maintained within normal limits  Description: INTERVENTIONS:  - Monitor labs and rhythm and assess patient for signs and symptoms of electrolyte imbalances  - Administer electrolyte replacement as ordered  - Monitor response to electrolyte replacements, including rhythm and repeat lab results as appropriate  - Fluid restriction as ordered  - Instruct patient on fluid and nutrition restrictions as appropriate  Outcome: Progressing     Problem: MUSCULOSKELETAL - ADULT  Goal: Return mobility to safest level of function  Description: INTERVENTIONS:  - Assess patient stability and activity tolerance for standing, transferring and ambulating w/ or w/o assistive devices  - Assist with transfers and ambulation using safe patient handling equipment as needed  - Ensure adequate protection for wounds/incisions during mobilization  - Obtain PT/OT consults as needed  - Advance activity as appropriate  - Communicate ordered activity level and limitations with patient/family  Outcome: Progressing  Goal: Maintain proper alignment of affected body part  Description: INTERVENTIONS:  - Support and protect limb and body  alignment per provider's orders  - Instruct and reinforce with patient and family use of appropriate assistive device and precautions (e.g. spinal or hip dislocation precautions)  Outcome: Progressing

## 2024-11-06 NOTE — DISCHARGE SUMMARY
[facility] Internal Medicine Hospitalist Discharge Summary     Patient ID:  Mohamud Banegas  84 year old  2/4/1940    Admit date: 11/3/2024    Discharge date: 11/6/24    Attending Physician: Ryan Ruiz DO     Primary Care Physician: Santana Brown MD     Discharge Diagnoses: CAP, Orthostatic Hypotension, Dysphagia, Hyponatremia    Discharge Condition: stable    Disposition:  Home    Important Follow Ups:  - PCP: 1-2 weeks  - Consults: Cardiology in 2-4 weeks    Hospital Course:      85 y/o M w/ PMHx of COPD, KI who presented to the hospital w/ syncope. Found to have LLL PNA.     CAP  Hx of COPD  Hx of KI  Plan:  - Continue Augmentin, s/p Azithro course, will treat for 5 days for now  - Pulm consulted  - Resume home inhalers  - Continue CPAP at night  - Required 2L w/ ambulation, oxygen to be set up prior to discharge     Syncope  New Dx of Moderate Aortic Stenosis  HTN  ECHO noted  Plan:  - Cardiology cleared for discharge today  - Ziopatch on discharge  - Losartan stopped on discharge     Dysphagia  Plan:  - SLP consulted     Leg Pain  XR negative for fracture  Plan:  - PT/OT     Hyponatremia, chronic  Plan:  - Monitor w/ daily labs     HLD  - statin    Consults: IP CONSULT TO HOSPITALIST  IP CONSULT TO CARDIOLOGY  IP CONSULT TO PULMONOLOGY  IP CONSULT TO SOCIAL WORK    Operative Procedures:        Patient instructions:      I as the attending physician reconciled the current and discharge medications on day of discharge.     Current Discharge Medication List        START taking these medications    Details   amoxicillin clavulanate 875-125 MG Oral Tab Take 1 tablet (875 mg total) by mouth every 12 (twelve) hours for 3 days.      cyclobenzaprine 5 MG Oral Tab Take 1 tablet (5 mg total) by mouth 3 (three) times daily as needed for Muscle spasms.           CONTINUE these medications which have NOT CHANGED    Details   pantoprazole 40 MG Oral Tab EC Take 1  tablet (40 mg total) by mouth daily.      famotidine 20 MG Oral Tab Take 1 tablet (20 mg total) by mouth As Directed.      cetirizine 10 MG Oral Tab Take 1 tablet (10 mg total) by mouth at bedtime.      ipratropium-albuterol 0.5-2.5 (3) MG/3ML Inhalation Solution Inhale 3 mL into the lungs 4 (four) times daily as needed.      Menaquinone-7 (VITAMIN K2) 100 MCG Oral Cap Take 1 capsule by mouth daily.      clobetasol 0.05 % External Cream Apply to affected area BID for a week then wait at least a week to apply again      rosuvastatin 5 MG Oral Tab 0.5 tablets (2.5 mg total).      Fluticasone Furoate (FLONASE SENSIMIST) 27.5 MCG/SPRAY Nasal Suspension 2 sprays by Nasal route daily.      omega-3 fatty acids 1000 MG Oral Cap Take 1,000 mg by mouth daily.      LORAZEPAM 1 MG Oral Tab TAKE 1 TABLET(1 MG) BY MOUTH TWICE DAILY AS NEEDED FOR ANXIETY      triamcinolone 0.1 % External Cream       fluticasone-umeclidin-vilant (TRELEGY ELLIPTA) 100-62.5-25 MCG/INH Inhalation Aerosol Powder, Breath Activated Inhale 1 puff into the lungs daily.      ZINC OR Take 25 mg by mouth.      Montelukast Sodium (SINGULAIR) 10 MG Oral Tab Take 1 tablet (10 mg total) by mouth daily.      Coenzyme Q10 (COQ10) 100 MG Oral Cap Take 30 mg by mouth daily.      magnesium 250 MG Oral Tab Take 1 tablet (250 mg total) by mouth as needed.      traZODone 50 MG Oral Tab Take 0.5-1 tablets (25-50 mg total) by mouth nightly.      Secukinumab (COSENTYX SENSOREADY PEN) 150 MG/ML Subcutaneous Solution Auto-injector Inject 150 mg into the skin Every 1 (one) months.      mupirocin 2 % External Ointment Apply 1 Application topically 3 (three) times daily.      Albuterol Sulfate HFA (PROAIR HFA) 108 (90 BASE) MCG/ACT Inhalation Aero Soln Inhale 2 puffs into the lungs every 6 (six) hours as needed for Wheezing.      VITAMIN D 1000 UNIT OR TABS Take 5,000 Units by mouth daily.           STOP taking these medications       VALSARTAN 80 MG Oral Tab        aspirin 81  MG Oral Tab EC              Activity: activity as tolerated  Diet: cardiac diet  Wound Care: as directed  Code Status: No Order      Exam on day of discharge:     Vitals:    11/06/24 0750   BP: 137/77   Pulse: 82   Resp: 18   Temp: 97.8 °F (36.6 °C)       General: no acute distress, AAOx3  Heart: regular rate and rhythm  Lungs: clear bilaterally, no active wheezing  Abdomen: nontender, nondistended  Extremities: no pedal edema   Neuro: no focal deficits      Total time coordinating care for discharge: Greater than 30 minutes    JULIO Briceno Roberts Chapelist

## 2024-11-06 NOTE — PROGRESS NOTES
11/06/24 1100   Mobility   O2 walk? Yes   SPO2% on Room Air at Rest 96   SPO2% on Oxygen at Rest 94   At rest oxygen flow (liters per minute) 2   SPO2% Ambulation on Room Air 87   SPO2% Ambulation on Oxygen 94   Ambulation oxygen flow (liters per minute) 2

## 2024-11-06 NOTE — PROGRESS NOTES
[facility]    Mohamud Banegas Patient Status:  Inpatient    1940 MRN [id number]   Location [facility] 3NE-A Attending Ryan Ruiz, DO   Hosp Day # 3 PCP Santana Brown MD     SUBJECTIVE:  no new complaints. Wants to go home.     OBJECTIVE:  /77 (BP Location: Left arm)   Pulse 82   Temp 97.8 °F (36.6 °C) (Oral)   Resp 18   Ht 5' 6\" (1.676 m)   Wt 130 lb (59 kg)   SpO2 94%   BMI 20.98 kg/m²   O2 requirement: on room air     I/O last 3 completed shifts:  In: 1667 [P.O.:1667]  Out: 650 [Urine:650]  I/O this shift:  In: 360 [P.O.:360]  Out: -      Current Medications:   Current Facility-Administered Medications:     amoxicillin clavulanate (Augmentin) 875-125 MG per tab 875 mg, 875 mg, Oral, Q12H    benzocaine-menthol (Cepacol) lozenge 1 lozenge, 1 lozenge, Oral, PRN    lidocaine-menthol 4-1 % patch 1 patch, 1 patch, Transdermal, Daily    cyclobenzaprine (Flexeril) tab 5 mg, 5 mg, Oral, TID PRN    influenza virus trivalent high dose PF (Fluzone HD) 0.5 mL injection (ages >/= 65 years) 0.5 mL, 0.5 mL, Intramuscular, Prior to discharge    enoxaparin (Lovenox) 40 MG/0.4ML SUBQ injection 40 mg, 40 mg, Subcutaneous, Daily    acetaminophen (Tylenol Extra Strength) tab 500 mg, 500 mg, Oral, Q4H PRN    albuterol (Ventolin HFA) 108 (90 Base) MCG/ACT inhaler 2 puff, 2 puff, Inhalation, Q6H PRN    fluticasone-salmeterol (Advair Diskus) 250-50 MCG/ACT inhaler 1 puff, 1 puff, Inhalation, BID    umeclidinium bromide (Incruse Ellipta) 62.5 MCG/ACT inhaler 1 puff, 1 puff, Inhalation, Daily    montelukast (Singulair) tab 10 mg, 10 mg, Oral, Daily    pantoprazole (Protonix) DR tab 40 mg, 40 mg, Oral, Daily    rosuvastatin (Crestor) tab 2.5 mg, 2.5 mg, Oral, Nightly    LORazepam (Ativan) tab 0.5 mg, 0.5 mg, Oral, Nightly PRN **OR** LORazepam (Ativan) tab 1 mg, 1 mg, Oral, Nightly PRN    cetirizine (ZyrTEC) tab 5 mg, 5 mg, Oral, Nightly     General appearance: alert, appears stated age, and cooperative  Lungs:  diminished breath sounds bilaterally  Heart: regular rate and rhythm  Abdomen: soft, non-tender; bowel sounds normal; no masses,  no organomegaly  Extremities: extremities normal, atraumatic, no cyanosis or edema          Lab Results   Component Value Date     11/06/2024    K 4.3 11/06/2024    K 4.3 11/06/2024    CL 98 11/06/2024    CO2 28.0 11/06/2024    BUN 14 11/06/2024    CREATSERUM 0.90 11/06/2024     11/06/2024    CA 9.2 11/06/2024    ALKPHO 49 11/06/2024    ALT 12 11/06/2024    AST 16 11/06/2024    BILT 0.6 11/06/2024    ALB 3.9 11/06/2024    TP 6.1 11/06/2024     Lab Results   Component Value Date    PT 13.8 03/26/2011    INR 0.99 06/20/2021    INR 1.0 05/20/2015    INR 1.05 03/26/2011          Imaging: reviewed     ASSESSMENT/PLAN:  LLL PNA- likely CAP- previous swallow eval showed mild dysphagia but hx more c/w CAP  - Augmentin  - completed short course of azithromycin  - PCT neg, RVP neg, sputum cx with normal karen  - urine legionella/strep ag negative  Asthma/COPD overlap- no signs of exac  - hold on systemic steroids  - cont singulair  - cont with advair/incruse in lieu of trelegy  - BD nebs scheduled  KI- AHI 15 on CPAP  - cont to use here if tolerated.  Dysphagia- previously very mild  - consider repeat swallow eval if he worsens  Hip pain following fall- per IM  Proph - LMWH  Dispo- full code  - desat screen reviewed. Home O2 being arranged. Okay for DC from pulmonary perspective. Follow-up in one week with MD or APN.

## 2024-11-06 NOTE — CM/SW NOTE
Notified by RN pt is medically cleared for discharge and will need home O2. Chart reviewed and noted O2 walk completed, pt requires 2L w/exertion.     MARITA sent referral to [facility] in [place]. MARITA messaged hospitalist Dr. Ruiz with below information to place in his progress note. MARITA also requested hospitalist cosign DME order for home oxygen.     MD to document AFTER O2 sats  I had a face to face visit with this patient and I evaluated the patient's O2 saturations.  The patient is mobile in their home and is in need of a portable oxygen tank.  The home oxygen will improve the patient's condition.    Once O2 sats and MD verbiage are completed and IF home O2 is indicated, SW to place MDO in Epic and request MD to cosign as appropriate. (Dx: CHF)    MARITA will attach signed O2 order and progress note to [place] once available. MARITA will continue to follow.    Addendum (1:10pm) - MARITA attached progress note and signed O2 order to referral in [place]. MARITA spoke with Brook at [facility] who stated she has all needed clinicals. MARITA informed Brook pt is medically cleared to discharge today. Brook stated she will reach out to pt.     MARITA called RT to dispense portable O2 tank to pt.      MARITA went to update pt at bedside and Brook with [facility] was at bedside speaking with pt.     [facility] contact information placed on AVS.     MORRO Vaughan  Discharge Planner

## 2024-11-06 NOTE — PROGRESS NOTES
11/06/24 1025   Mobility   O2 walk? Yes   SPO2% on Room Air at Rest 97   At rest oxygen flow (liters per minute) 0   SPO2% Ambulation on Room Air 94   Ambulation oxygen flow (liters per minute) 0

## 2024-11-06 NOTE — PROGRESS NOTES
I had a face to face visit with this patient and I evaluated the patient's O2 saturations.  The patient is mobile in their home and is in need of a portable oxygen tank.  The home oxygen will improve the patient's condition on discharge

## 2024-11-07 LAB
ANTI-EJ: NEGATIVE
ANTI-JO-1: <20 UNITS
ANTI-KU: NEGATIVE
ANTI-MDA-5: <20 UNITS
ANTI-MI-2 AB: NEGATIVE
ANTI-NXP-2: <20 UNITS
ANTI-OJ: NEGATIVE
ANTI-PL-12: NEGATIVE
ANTI-PL-7: NEGATIVE
ANTI-PM/SCL100: <20 UNITS
ANTI-SAE1: <20 UNITS
ANTI-SRP AB: NEGATIVE
ANTI-SSA 52KD IGG: <20 UNITS
ANTI-TIF-1GAMMA: <20 UNITS
ANTI-U1 RNP: <20 UNITS
ANTI-U2 RNP: NEGATIVE
ANTI-U3 RNP: NEGATIVE

## 2024-11-11 ENCOUNTER — TELEPHONE (OUTPATIENT)
Dept: RHEUMATOLOGY | Facility: CLINIC | Age: 84
End: 2024-11-11

## 2025-01-08 ENCOUNTER — TELEPHONE (OUTPATIENT)
Facility: CLINIC | Age: 85
End: 2025-01-08

## 2025-01-09 ENCOUNTER — TELEPHONE (OUTPATIENT)
Facility: CLINIC | Age: 85
End: 2025-01-09

## 2025-01-10 NOTE — TELEPHONE ENCOUNTER
Phoned pt, LVM for pt to return our call. Per Dr. Hernandez LOV note pt taking 300mg monthly. Please clarify with pt call back.,

## 2025-01-15 ENCOUNTER — OFFICE VISIT (OUTPATIENT)
Dept: RHEUMATOLOGY | Facility: CLINIC | Age: 85
End: 2025-01-15
Payer: MEDICARE

## 2025-01-15 VITALS
BODY MASS INDEX: 20.89 KG/M2 | SYSTOLIC BLOOD PRESSURE: 138 MMHG | DIASTOLIC BLOOD PRESSURE: 60 MMHG | RESPIRATION RATE: 16 BRPM | HEIGHT: 66 IN | WEIGHT: 130 LBS | HEART RATE: 64 BPM | TEMPERATURE: 98 F

## 2025-01-15 DIAGNOSIS — L40.0 PLAQUE PSORIASIS: ICD-10-CM

## 2025-01-15 DIAGNOSIS — Z79.899 IMMUNOCOMPROMISED STATE DUE TO DRUG THERAPY (HCC): ICD-10-CM

## 2025-01-15 DIAGNOSIS — D84.821 IMMUNOCOMPROMISED STATE DUE TO DRUG THERAPY (HCC): ICD-10-CM

## 2025-01-15 DIAGNOSIS — E55.9 VITAMIN D DEFICIENCY: ICD-10-CM

## 2025-01-15 DIAGNOSIS — Z11.1 SCREENING FOR TUBERCULOSIS: ICD-10-CM

## 2025-01-15 DIAGNOSIS — L40.50 PSORIATIC ARTHRITIS (HCC): Primary | ICD-10-CM

## 2025-01-15 DIAGNOSIS — R53.82 CHRONIC FATIGUE: ICD-10-CM

## 2025-01-15 DIAGNOSIS — M50.30 DDD (DEGENERATIVE DISC DISEASE), CERVICAL: ICD-10-CM

## 2025-01-15 PROCEDURE — 99214 OFFICE O/P EST MOD 30 MIN: CPT | Performed by: INTERNAL MEDICINE

## 2025-01-15 PROCEDURE — G2211 COMPLEX E/M VISIT ADD ON: HCPCS | Performed by: INTERNAL MEDICINE

## 2025-01-15 NOTE — PROGRESS NOTES
RHEUMATOLOGY FOLLOW UP   Date of visit: 01/15/2025  ?  Chief Complaint   Patient presents with    Psoriatic Arthritis     3 month f/u. Feeling the same. Joint pain has gotten better. No knee pain. Still having skin patches. No new symptoms. Converted rapid score of 2.7       ASSESSMENT, DISCUSSION & PLAN   Assessment:  1. Psoriatic arthritis (HCC)    2. Plaque psoriasis    3. Immunocompromised state due to drug therapy (HCC)    4. Chronic fatigue    5. Vitamin D deficiency    6. Screening for tuberculosis    7. DDD (degenerative disc disease), cervical            Discussion:  Mr. Mohamud Banegas is a 83 yo man who initially presented for second opinoin regarding his joint pain and skin. States he has had a long standing history of psoriasis for over 40 years but was fairly mild until recently. He was diagnosed with seronegative RA in 2019 and started on plaquenil and prednisone with some relief of symptoms. Starting this spring/summer, he has had significant worsened skin rashes. He was evaluated by dermatology who dx pt with psoriasis. Tried otezla which seemed to work initially, however had continued skin rash. He presents for second opinion. At his initial visit, he had minimal synovitis of the joints of the hands, no signs of tenosynovitis, positive nail pitting of fingernails diffusely with some onycholysis. He does have left sided SI joint tenderness.     Since that time, he was started on cosentyx and doing well. He has tolerated so far without obvious side effects.   At a prior visit, he felt his skin worsened so we increased dosage to 300mg every 4 weeks instead of the 150mg. He continues to report active skin disease (prior exams showed dry skin but no active psoriasis). He denies joint pain or swelling like previously.   His main complaint revolves around neck/lower back  Previously recommended he start formal PT since he admits to not doing an HEP as previously discussed.  Previously also recommended  he consider pain management again, although he has been seen before and does not seem interested in injections. Pt has significant DDD of the cervical spine with mild spinal stenosis but moderate to severe neural foraminal stenosis in various areas. Discussed previously that these findings may be contributing to his weakness.     Given his improvement of symptoms overall, he will continue with decreased cosentyx at 150mg every 4 weeks. If concern for infection, okay to take every 5-6 weeks and take tylenol as needed for pain in the weeks leading up to the injection.  Prior cough improved after treatment of pneumonia and holding cosentyx one month.   Okay to continue topicals prn for the skin  Continues following with pulmonology and other specialists    Follow up in 3 months or sooner as needed    Patient and pt's wife verbalized understanding of above instructions. No further questions at this time.    Code selection for this visit was based on time spent (30min) on date of service in preparing to see the patient, obtaining and/or reviewing separately obtained history, performing a medically appropriate examination, counseling and educating the patient/family/caregiver, ordering medications or testing, referring and communicating with other healthcare providers, documenting clinical information in the E HR, independently interpreting results and communicating results to the patient/family/caregiver and care coordination with the patient's other providers.    ?  Plan:  Diagnoses and all orders for this visit:    Psoriatic arthritis (HCC)  -     CBC With Differential With Platelet; Future  -     Comp Metabolic Panel (14); Future    Plaque psoriasis  -     CBC With Differential With Platelet; Future  -     Comp Metabolic Panel (14); Future    Immunocompromised state due to drug therapy (HCC)  -     CBC With Differential With Platelet; Future  -     Comp Metabolic Panel (14); Future    Chronic fatigue  -     Magnesium;  Future  -     Vitamin D; Future    Vitamin D deficiency  -     Vitamin D; Future    Screening for tuberculosis  -     Quantiferon TB Plus; Future    DDD (degenerative disc disease), cervical                  Return in about 3 months (around 4/15/2025).  ?  HPI   Mohamud Banegas is a 84 year old male with the following active problems who was seen initially for second opinion regarding his psoriasis/psoriatic arthritis. He was previously on Otezla and is now on Cosentyx. He presents for follow up today.      Since his last visit, he has been okay.  Required two different hospitalizations in November- had an episode of syncope- states was drinking water and then fell down. Denies symptoms around the episode. Had lower oxygen when walking- ?vasovagal and had dx with pneumonia. Heart monitor was neg per pt. Did require supplemental oxygen for some time. Also had significant orthostatic hypotension. Stopped bp meds totally and BP has been higher without symptoms like before.     Did skip Nov cosentyx and then resumed in December. Did have some increased knee pain so redosed at about 3 weeks.    Has no longer having any low oxygen levels.   Denies lightheaded.  Prior cough improved, did start up again a week ago. Adjust his diet and feels better.   Denies current shortness of breath except if going up the stairs often.  Wife mentions he's more bent over from the arthritis.   Pt reports some neck stiffness but no other back pain.    Feels continued psoriasis, especially since being off cosentyx. Is applying topicals. Particularly over the legs and back. Previously felt better with the 300mg of cosentyx instead of 150mg.     Still with significant balance issues.  Not walking much- only about 2500k steps  Has gone to PT in the past but hasn't been compliant   Not sleeping as much (previously thought sleeping too much). Difficulty concentrating at times. And still with low energy/fatigue    Denies significant joint pain  (primarily in the knees), states manageable.   Denies overt swelling of the joints.   + some morning stiffness, felt overall- improves with activity,   Denies any further fractures. Continues K2 supplementation.       Does have hx of COPD/emphysema- now seeing Dr. Bee ([facility])      Previously:   Seen at [facility]- had scope done which did not show aspiration. Told \"something sticking out of trachea\" but not likely contributing.     Was seen by spine surgeon previously- told he would've recommended surgery. This was after the fall last year.   CT neck in 07/2023- showed hematoma in the left paraspinal muscle and fracture at tip of C4 spinous process  MRI cervical in 10/2023-  showed multiple things including \"C5-6 facet joint diastases with interspinous ligamentous injury and multilevel cervical spondylosis with spinal stenosis. Advanced stenosis involving foraminal levels c6-7 and to lesser degree c3-c4 through c5-6. There is mild multilevel central canal stenosis involving c3-4 through c5-6.\"  2nd opinion spine surgeon did not recommend surgery (prior)    Seen by ENT previously- some submandibular swelling- told no change in management and per pt not concerned.          HPI from initial consultation  referred for rheumatologic evaluation due to second opinion regarding psoriasis/psoriatic arthritis.     Has suffered from plaque psoriasis for several years (over 40 years). Initially was affecting the hands and fingernail and over time affected other areas diffusely.    Then around 2019, he he suffered a wound on the left leg, states he had swelling in the foot/leg. Had DVT ruled out and eventually was diagnosed with arthritis by his PCP. Was placed on low dose prednisone with no relief, then when placed on 20mg had improvement with higher dose.   Had pain/swelling in the left foot, then the right foot then both hands and wrists. Was so difficult he couldn't even turn a key.   Was evaluated by another  rheumatologist and was diagnosed with seronegative rheumatoid arthritis. Had recommended methotrexate but pt did research and did not want to take medication. Was placed on hydroxychloroquine in March of 2020. Has been taking since that time. States seemed like the medication helped with most of the joint pain with exception of the feet being swollen.  Swelling does improve with elevating the legs.   Had difficulty walking up/down the stairs due to the \"arthritis\" pt just states different areas bothering him.  Had significant flare of psoriasis over the summer and was referred to dermatologist. Was given different topicals  Was given samples of Otezla per pt preference. Started on starter pack but was not able to get approved through insurance. After completing sample pack, he was able to get generic from [place] but still having skin flaring while on the generic.  Was seen by dermatology- Dr. Mccann again, and started prednisone (as high as 40mg) and did another course of steroids. Had biopsy of a few lesions and is waiting to find pathology.   Has lesions over belly, chest, back, arms, and legs. States very itchy.     Denies current joint pain, but taking prednisone currently. States was having some wrist and hand pain when off the steroids.   + fingernail changes from psoriasis  + middle finger PIPs have ramsey swollen  + chronic contracture of the left pinky - did have surgery but had recurrence.     + morning stiffness and difficult walking, lasted throughout the day when severe, but much better when on otezla alone   + altered gum sensation and some pain  + hx of problem with his eye, inflamed per pt. Saw Dr. Swann, required steroid eye drops and topical abx (a few months ago)  + hx of pancreatitis- thought related to pancreatic divisum; and elevated cholesterol   + constipation while on plaquenil resolved since that time  + some left sided low back pain, typically at night and can be difficult to get into a  comfortable position  + difficulty falling asleep at night- take ativan; was given mirtazapine but isn't helping. Not necessarily due to pain itself.   + dry mouth throughout the day, improved with prednisone, also drinking a lot of water       Hx of SVT s/p ablation  Some difficulty swallowing typically when laying hardik  Hx of KI   Hx of diverticulosis without hx of diverticulitis       The patient denies oral or nasal ulcers, Raynaud's phenomenon, prior hematologic abnormality, prior renal or liver disease, or history of seizures.  No history of prior blood clot in the legs or lungs, strokes or ischemic phenomenon.  Denies nonhealing ulcers on the fingertips.  The patient denies any crampy abdominal pain, diarrhea, bloody stools, nodular painful shin bruises, Achilles heel pain or symptoms of enthesitis.  There are no symptoms of severe dry eyes.  No fevers, chills, lymphadenopathy, unexpected weight loss,      Past Medical History:  Past Medical History:    Allergic rhinitis    COPD    COPD (chronic obstructive pulmonary disease) (HCC)    Empyema    GERD    GERD (gastroesophageal reflux disease)    Heart disease    High cholesterol    OTHER DISEASES    PVD    Pancreas divisum    Pancreatitis x 1 in 5/10    Rotator cuff tear    RIGHT SHOULDER    Seronegative rheumatoid arthritis (HCC)    SLEEP APNEA    AHI 15, REM 25, amirah 88%    Spermatocele     Past Surgical History:  Past Surgical History:   Procedure Laterality Date    Colonoscopy  > 10 years ago    Ndsc ablation & rcnstj [facility] w/o bypass      Repair ing hernia,5+y/o,reducibl  01/01/1952     Family History:  Family History   Problem Relation Age of Onset    Other (Other) Father         COPD, CKD    Cancer Mother         breast     Social History:  Social History     Socioeconomic History    Marital status:    Occupational History    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   Tobacco Use    Smoking status: Former     Current packs/day: 0.00     Average packs/day:  1 pack/day for 63.7 years (63.7 ttl pk-yrs)     Types: Cigarettes     Start date: 1955     Quit date: 2018     Years since quittin.3    Smokeless tobacco: Never   Vaping Use    Vaping status: Never Used   Substance and Sexual Activity    Alcohol use: Not Currently     Alcohol/week: 0.0 standard drinks of alcohol     Comment: very rare    Drug use: No   Other Topics Concern    Occupational Exposure Yes     Comment: steel mill, [employer] dust    Exercise Yes     Comment: walks about 1 mile at least 2 -3 times a week     Social Drivers of Health     Food Insecurity: No Food Insecurity (11/3/2024)    Food Insecurity     Food Insecurity: Never true   Transportation Needs: No Transportation Needs (11/3/2024)    Transportation Needs     Lack of Transportation: No   Housing Stability: Low Risk  (11/3/2024)    Housing Stability     Housing Instability: No     Medications:  Outpatient Medications Marked as Taking for the 1/15/25 encounter (Office Visit) with Leslee Hernandez DO   Medication Sig Dispense Refill    pantoprazole 40 MG Oral Tab EC Take 1 tablet (40 mg total) by mouth daily.      famotidine 20 MG Oral Tab Take 1 tablet (20 mg total) by mouth As Directed.      cetirizine 10 MG Oral Tab Take 1 tablet (10 mg total) by mouth at bedtime.      Secukinumab (COSENTYX SENSOREADY PEN) 150 MG/ML Subcutaneous Solution Auto-injector Inject 150 mg into the skin Every 1 (one) months.      ipratropium-albuterol 0.5-2.5 (3) MG/3ML Inhalation Solution Inhale 3 mL into the lungs 4 (four) times daily as needed.      Menaquinone-7 (VITAMIN K2) 100 MCG Oral Cap Take 1 capsule by mouth daily.      clobetasol 0.05 % External Cream Apply to affected area BID for a week then wait at least a week to apply again 60 g 2    rosuvastatin 5 MG Oral Tab 0.5 tablets (2.5 mg total).      mupirocin 2 % External Ointment Apply 1 Application topically 3 (three) times daily.      Fluticasone Furoate (FLONASE SENSIMIST) 27.5 MCG/SPRAY  Nasal Suspension 2 sprays by Nasal route daily.      omega-3 fatty acids 1000 MG Oral Cap Take 1,000 mg by mouth daily.      LORAZEPAM 1 MG Oral Tab TAKE 1 TABLET(1 MG) BY MOUTH TWICE DAILY AS NEEDED FOR ANXIETY 30 tablet 0    triamcinolone 0.1 % External Cream       fluticasone-umeclidin-vilant (TRELEGY ELLIPTA) 100-62.5-25 MCG/INH Inhalation Aerosol Powder, Breath Activated Inhale 1 puff into the lungs daily. 3 each 3    ZINC OR Take 25 mg by mouth.      Montelukast Sodium (SINGULAIR) 10 MG Oral Tab Take 1 tablet (10 mg total) by mouth daily. 90 tablet 3    Coenzyme Q10 (COQ10) 100 MG Oral Cap Take 30 mg by mouth daily.      Albuterol Sulfate HFA (PROAIR HFA) 108 (90 BASE) MCG/ACT Inhalation Aero Soln Inhale 2 puffs into the lungs every 6 (six) hours as needed for Wheezing. 1 Inhaler 0    magnesium 250 MG Oral Tab Take 1 tablet (250 mg total) by mouth as needed.      VITAMIN D 1000 UNIT OR TABS Take 5,000 Units by mouth daily.       Modified Medications    No medications on file     Medications Discontinued During This Encounter   Medication Reason    traZODone 50 MG Oral Tab        ?  ?  Allergies:  Allergies   Allergen Reactions    Aspirin NAUSEA ONLY     HIGHER DOSAGES. LOW DOSE ASPIRIN OK    Bactrim [Sulfamethoxazole W/Trimethoprim] RASH    Motrin Ib NAUSEA ONLY    Other UNKNOWN    Sulfamethoxazole UNKNOWN    Amlodipine RASH    Ibuprofen NAUSEA ONLY     ?  REVIEW OF SYSTEMS   ?  Review of Systems   Constitutional:  Positive for malaise/fatigue and weight loss. Negative for chills and fever.        Gets cold easily   HENT:  Positive for hearing loss. Negative for congestion, nosebleeds and sinus pain.    Eyes:  Negative for pain and redness.   Respiratory:  Positive for cough, sputum production and shortness of breath (hx COPD). Negative for hemoptysis.    Cardiovascular:  Negative for chest pain, palpitations and leg swelling.   Gastrointestinal:  Positive for constipation (stable), heartburn (intermittent)  and nausea. Negative for abdominal pain, blood in stool, diarrhea and vomiting.   Genitourinary:  Negative for dysuria, hematuria and urgency.   Musculoskeletal:  Positive for back pain and joint pain. Negative for myalgias.   Skin:  Positive for rash. Negative for itching.   Neurological:  Positive for weakness (generalized). Negative for dizziness, tingling and headaches.   Endo/Heme/Allergies:  Positive for environmental allergies.   Psychiatric/Behavioral:  The patient is nervous/anxious.      PHYSICAL EXAM   Today's Vitals:  Temperature Blood Pressure Heart Rate Resp Rate SpO2   Temp: 97.6 °F (36.4 °C) BP: 138/60 Pulse: 64 Resp: 16 SpO2:  (unable to read on pulseox)   ?  Current Weight Height BMI BSA Pain   Wt Readings from Last 1 Encounters:   01/15/25 130 lb (59 kg)    Height: 5' 6\" (167.6 cm) Body mass index is 20.98 kg/m². Body surface area is 1.67 meters squared.         Physical Exam  Vitals and nursing note reviewed.   Constitutional:       General: He is not in acute distress.     Appearance: He is well-developed. He is not diaphoretic.      Comments: thin   HENT:      Head:      Comments: Slight scar from recent laceration      Ears:      Comments: + hearing aid  Eyes:      General: No scleral icterus.     Extraocular Movements: Extraocular movements intact.      Conjunctiva/sclera: Conjunctivae normal.   Neck:      Vascular: No JVD.      Trachea: No tracheal deviation.   Cardiovascular:      Rate and Rhythm: Normal rate and regular rhythm.   Pulmonary:      Effort: Pulmonary effort is normal. No respiratory distress.      Breath sounds: No wheezing.      Comments: Expiratory rales/coarse breath sounds diffusely  Musculoskeletal:         General: Deformity present. No swelling or tenderness.      Comments: B/l cmc enlargement wo synovitis  Left pinky finger post-raumatic  No swelling, tenderness, redness or restriction of motion of the DIPs, pips, MCPs, wrists, elbows, ankles, or joints of the  feet.  B/l Chronic contracture of left pinky - appearance of dupuytren's contracture   Bilateral knees without medial joint line tenderness, mild crepitus, no effusion.  Neck flexed and difficulty straightening; thoracic kyphosis - stable   Previously noted- Pes planus; Left lateral mid foot nodule without surrounding synovitis   Skin:     General: Skin is warm and dry.      Coloration: Skin is pale.      Findings: No erythema or rash.      Comments: Significant psoriaform lesions noted over chest, abdomen, back, upper arms -- resolved now only scattered small spots over legs and hands noted  + diffusely nail pitting; scattered onycholysis -- resolved    Neurological:      Mental Status: He is alert and oriented to person, place, and time.      Cranial Nerves: No cranial nerve deficit.      Gait: Gait abnormal.   Psychiatric:         Mood and Affect: Mood normal.         Behavior: Behavior normal.       ?  Radiology review:       DATE OF SERVICE: 06.04.2024  CT CHEST    CLINICAL INFORMATION: Cough, unspecified type    COMPARISON: CT chest 6/20/2023.    TECHNIQUE:  Routine helical scanning was performed through the chest without contrast.    Automated exposure control and ALARA manual techniques for patient specific dose reduction were  followed while maintaining the necessary diagnostic image quality.    FINDINGS:  Evaluation of the mediastinal and vascular structures is suboptimal without IV contrast.    SUPPORT DEVICES:  None.  THORACIC INLET:Normal.  HEART: Coronary artery atherosclerosis..  AORTA:Aortic atherosclerosis.  PULMONARY ARTERIES:Normal.  MEDIASTINUM/MEME:  Normal.  ESOPHAGUS:Normal.  CHEST WALL:  Normal.  AXILLA:Normal.  MUSCULOSKELETAL:  Degenerative changes of the spine.  UPPER ABDOMEN: Normal.  TRACHEA:Normal.  LUNGS/PLEURA:  Moderate panlobular emphysema.    Lower lobe bronchial wall thickening and mucous plugging with associated interstitial and  groundglass opacities. Similar findings are also  seen at the lingula.    Scattered 3 mm pulmonary micronodules (series 3 image 50, 75, 167) are stable.  Previously noted interval left upper lobe 3.5 mm nodule has resolved.    =====  IMPRESSION:    1.  Lower lobe and lingular bronchial wall thickening, mucous plugging with associated opacities.  These are new from 6/28/2023 and suggest an infectious or inflammatory bronchitis.  2.  Stable pulmonary micronodules.    Clinicians: If you have any questions or concerns regarding the study or report, contact the  interpreting radiologist Haris Ruffin MD directly at extension [phone].  Exam End: 06/04/24  7:11 PM    Specimen Collected: 06/05/24  9:24 AM Last Resulted: 06/05/24  9:33 AM     DATE OF SERVICE: 06.04.2024   CLINICAL INDICATION: 84 years-old Male with  Cough, unspecified type.     TECHNIQUE: Axial paranasal sinus acquisition, with sagittal and coronal reformations.   Contrast: Noncontrast.   Dose reduction CT scan done according to ALARA (As Low as Reasonably Achievable) or ALARA/IMAGE   GENTLY.     COMPARISON: CT facial bones, 7/202019.     FINDINGS:   Nasal cavity (turbinates, septum): NO nasal cavity masses or large polyps are identified. There is   moderate bilateral turbinate hypertrophy and mucosal thickening with bilateral large middle   turbinate samuel bullosa narrowing the central nasal passageways. The bilateral middle turbinates   show paradoxical morphology.   Septum: There is mild nasal septal bowing to the LEFT with a small spur contacting the LEFT inferior   turbinate. Additional mild 3 mm spurring along the posterior aspect of the RIGHT nasal septum is   noted.     SINUSES AND DRAINAGE PATHWAYS:   RIGHT:   Frontal sinus and frontal recess: Mild mucosal thickening at the inferior RIGHT frontal sinus   narrowing the frontoethmoidal recess.   Maxillary sinus: Clear. There is a patent accessory maxillary ostium.   Ethmoid sinuses: Clear.   Ostiomeatal complex: Patent   Sphenoid sinus: Clear.   Sphenoethmoidal recess: Clear.     LEFT:   Frontal sinus and frontal recess: Clear.   Maxillary sinus: Clear. There are healed LEFT maxillary sinus and zygoma fractures. There is a   patent accessory maxillary ostium.   Ethmoid sinuses: Clear.   Ostiomeatal complex: Patent with a small infraorbital ethmoidal air (Beto) cell.   Sphenoid sinus: Clear.   Sphenoethmoidal recess: Clear.     ANATOMIC VARIATIONS:   Ethmoid roofs: RIGHT Keros II morphology. LEFT Keros II morphology. The olfactory fossae and   cribriform plates are morphologically intact without CT evidence of congenital osseous dehiscence.   Frontal air cells: A RIGHT type IV frontal air cell is present.   Sphenoid sinus: Sellar/post-sellar pneumatization of the sphenoid sinus. Intact appearance of the   osseous sellar floor.Nonpneumatized yanet bandar, clinoid processes, and sphenoid pterygoid   recesses. NO Onodi air cells.   Carotid canals: The osseous coverings of the carotid canals are intact.   Lamina papyracea: Intact bilaterally.   Ethmoidale notches: Anterior ethmoidal notches are protected, abutting the fovea ethmoidalis.   Orbits: There are bilateral lens replacements noted.   Anterior cranial fossa: Intact.   Maxillary teeth: NO CT evidence of odontogenic sinusitis. Intact hard palate.   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   Other findings: The visualized portions of the middle ear cavities and mastoid air cells are clear.   A few punctate calcifications are noted in the LEFT parotid salivary gland.   Nasopharynx/oropharynx: NO nasopharyngeal or tonsillar mass detected.     DATE OF SERVICE: 03.20.2024  CT ABDOMEN+PELVIS(CONTRAST ONLY)(CPT=74177)    CLINICAL INDICATION: Right sided abdominal pain.    COMPARISON STUDY: None available.    TECHNIQUE:  Axial images of the abdomen and pelvis were performed from the lung bases through the  pubic symphysis after the injection of nonionic intravenous contrast.  Oral contrast was not used  for  the examination.  80 mL of Isovue 370 were administered intravenously.    Automated exposure control and ALARA manual techniques for patient specific dose reduction were  followed while maintaining the necessary diagnostic image quality.    ADVERSE REACTION: None.    FINDINGS:    LUNG BASES: There are emphysematous changes in the lungs.    LIVER: There are subcentimeter hypodensities too small to further characterize in the liver    GALLBLADDER/BILIARY TREE: Normal.    PANCREAS: Normal.    SPLEEN: Normal    ADRENAL GLANDS: Normal.    KIDNEYS URETERS AND BLADDER: Normal.    PELVIC ORGANS: Prostate gland is present.    ABDOMINAL AORTA: Abdominal aorta is normal in caliber. There are prominent atherosclerotic  calcifications and plaque in the abdominal aorta and its branch vessels. There is high-grade  stenosis or occlusion of the left common iliac artery.    LYMPH NODES: There is no evidence of mesenteric, retroperitoneal or inguinal adenopathy.    BOWEL: There are diverticula in the colon. There is a moderate to large amount of stool in the  colon. The appendix is normal in caliber. Small bowel loops are normal in caliber.    PERITONEUM: No free fluid.    ABDOMINAL WALL: There is a small right inguinal hernia containing fat. There is protrusion of a  couple small bowel loops towards the hernia. There is no obstruction.    OSSEOUS STRUCTURES: There are degenerative changes in the lumbar spine. There is grade 1  anterolisthesis of L5 on S1 and bilateral spondylolysis at L5.    =====  IMPRESSION:  1. 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 Colonic diverticulosis and moderate to large amount stool in the colon  3. Small right inguinal hernia containing fat with protrusion of a couple small bowel loops towards  the hernia sac without obstruction  Exam End: 03/20/24 11:26 AM    Specimen Collected: 03/20/24 11:49 AM Last  Resulted: 03/20/24 12:01 PM     DATE OF SERVICE: 10.01.2023  MRI CERVICAL SPINE    CLINICAL INDICATION: Cervical radiculopathy  upper back pain. Fall July 12, 2023 with C4  spinous process fracture    TECHNIQUE: Multiplanar and multisequence MR imaging was performed through the cervical spine  using the standard MR protocol.    COMPARISON:   Report CT cervical spine 7/12/2023 from [facility]    CONTRAST: None    FINDINGS:    Alignment: Straightening and reversal normal cervical lordosis . Grade 1 C3-C4 and C4-C5  anterolisthesis and C5-C6 retrolisthesis.  Vertebrae: Vertebral body heights are maintained. The known C4 spinous process fracture is less  apparent on MRI than on the previous reported CT. Multilevel Modic 1/2 endplate degenerative changes  otherwise unremarkable marrow signal.  Cervical Cord:   Cerebellar tonsils are appropriately positioned.  Cervical medullary junction is unremarkable.  Cervical cord has maintained caliber and signal.    Discs: Diffuse disc desiccation. There is mild C3-C4 and C4-C5, severe C5-C6 and C6-C7 disc space  narrowing. Multilevel osteophytosis.  Disc levels:  C1-C2: Unremarkable.  Central canal: No stenosis.  C2-C3: Severe left and moderate right facet arthropathy.  Central canal: No stenosis.  Neural foramen:  Mild left and no right foraminal stenosis.  C3-C4: Grade 1 anterolisthesis. Disc osteophyte complex with moderate left and mild to  moderate right facet arthropathy.  Central canal: Mild stenosis  Neural foramen:  Moderate right and severe left foraminal stenosis.  C4-C5: Grade 1 anterolisthesis. Disc osteophyte complex with severe right and mild left facet  arthropathy.  Central canal: Mild stenosis  Neural foramen:  Moderate right greater than left foraminal stenosis.  C5-C6: Disc osteophyte complex with moderate right and mild left facet arthropathy. There is  diastases of the left facet joint which is measuring 1.2 cm (series 602, image 5). There is  no  evidence of jumped or perched facet. No evidence of fracture by MR.  Central canal: Mild stenosis  Neural foramen:  Moderate right greater than left stenosis.  C6-C7: Disc osteophyte complex eccentric towards the left with mild facet arthropathy  Central canal: No stenosis  Neural foramen:  Severe left and moderate right foraminal stenosis.  C7-T1: Unremarkable  Central canal: No stenosis.  Neural foramen:  No bilateral neural foraminal stenosis.  T1-T2: Disc osteophyte complex. T1 left posterior lateral bone island. No central canal stenosis.  Mild left and moderate right foraminal stenosis.    Paraspinal Soft Tissues: Interspinous ligamentous edema at the C5-C6 level indicating underlying  ligamentous injury.  Other findings: None..    =====  IMPRESSION:  1.  C5-C6 left facet joint diastases with interspinous ligamentous injury at C5-C6. Given the  degree of diastases, stability of the facet joint is indeterminate and given the possibility of  instability, reevaluation with CT cervical spine is recommended to assess for underlying new osseous  injury which may be MR occult.  2.  The above findings were communicated by telephone to Abby Freedman NP by Dr. Leslee Hammond on  10/2/2023 3:30 PM.  3.  Multilevel cervical spondylosis with spinal stenosis. There is advanced spinal stenosis  involving the foraminal levels of C6-C7 and to a lesser degree C3-C4 through C5-C6. There is mild  multilevel central canal stenosis involving C3-C4 through C5-C6.  Exam End: 10/01/23  3:20 PM    Specimen Collected: 10/02/23  1:49 PM Last Resulted: 10/02/23  3:35 PM   Received From: [facility]  Result Received: 11/20/23  2:30 PM     Narrative                 Impression   PROCEDURE:  CT BRAIN OR HEAD (25963)     COMPARISON:  C[facility] CT, CT BRAIN OR HEAD (00513), 7/12/2023, 2:05 PM.     INDICATIONS:  Left eye droop     TECHNIQUE:  Noncontrast CT scanning is performed through the brain. Dose reduction techniques were used.  Dose information is transmitted to the ACR (American College of Radiology) NRDR (National Radiology Data Registry) which includes the Dose Index  Registry.     PATIENT STATED HISTORY: (As transcribed by Technologist)  Left eye droop      FINDINGS:  No evidence of intracranial hemorrhage or extra-axial fluid collection.  Lucencies in the deep periventricular white matter are likely sequelae of chronic small vessel ischemic disease.  Prominence of the sulci.  No mass effect.  Visualized portions of paranasal sinuses are unremarkable.  Visualized portions of the mastoid air cells are unremarkable.  Visualized portions of the orbits are unremarkable.  IMPRESSION:  Sequelae of chronic small vessel ischemic disease is noted. No evidence of intracranial hemorrhage or extra-axial fluid collection.           LOCATION:  [facility]        Dictated by (CST): Jakub Howard MD on 7/14/2023 at 10:28 AM      Finalized by (CST): Jakub Howard MD on 7/14/2023 at 10:31 AM      Narrative   PROCEDURE:  CT SPINE CERVICAL (CPT=72125)     COMPARISON:  None.     INDICATIONS:  fall, +LOC, lac to back of head     TECHNIQUE:  Noncontrast CT scanning of the cervical spine is performed from the skull base through C7.  Multiplanar reconstructions are generated.  Dose reduction techniques were used. Dose information is transmitted to the ACR (American College of  Radiology) NRDR (National Radiology Data Registry) which includes the Dose Index Registry.     PATIENT STATED HISTORY: (As transcribed by Technologist)  Patient states he fell today.         FINDINGS:  Cervical spine demonstrates normal vertebral body height and alignment.  Adelina Multilevel degenerative changes are present.  The spinal canal is patent.  Evaluation of the soft tissues demonstrates a hematoma in the left paraspinal musculature  measuring up to 5.3 x 2.9 cm. Lung apices are within normal limits.                   Impression   CONCLUSION:       1. Hematoma in the left  paraspinal musculature is noted.       2. Fracture at the tip of the C4 spinous process.        LOCATION:  [facility]        Dictated by (CST): Yariel Hammond MD on 7/12/2023 at 2:28 PM      Finalized by (CST): Yariel Hammond MD on 7/12/2023 at 2:33 PM         Exam: CTA ABDOMEN  and  PELVIS WW/O CONTRAST   CPT Code(s): 87140,-363 - CT ANGIO ABD and PELV W/O and W/DYE,Omnipaque 350 100 mL vial     INDICATION:  Crescentic focus of increased T1 signal intensity within the wall of the aorta on a lumbar spine MRI study. It was uncertain if this represented calcification or intramural hemorrhage. A CTA study was recommended for further evaluation.     COMPARISON:  5/12/2022 lumbar spine MRI study.     TECHNIQUE: Routine CTA study of the abdomen and pelvis performed with 100 cc of intravenous Omnipaque 350.  Sagittal and coronal reconstructed images. This study was performed on a Siemens Somatom Go CT scanner with both CARE Dose 4D tube modulation and   SAFIRE iterative reconstruction for radiation dose reduction.     FINDINGS:   LUNG BASES: Nonspecific linear atelectasis versus scarring is identified at the lung bases bilaterally. The visualized lung bases are otherwise clear with no focal airspace consolidation. There is no evidence of pleural fluid. Heart size is within normal    limits.     ABDOMEN:  The liver, spleen, pancreas, adrenal glands, and adrenal glands, and kidneys are normal. The gallbladder is unremarkable with no evidence of gallstones or gallbladder wall thickening.     The abdominal aorta is normal in caliber with extensive atherosclerotic plaque and atherosclerotic vascular calcifications identified corresponding to the crescentic areas of T1 hyperintense signal seen on the MRI study. There is no evidence of an   intramural hematoma or aortic dissection. The celiac and superior mesenteric artery origins are widely patent bilaterally. There is a single widely patent left renal artery with 3 smaller  caliber, but widely patent right renal arteries. There is focal   atherosclerotic ulceration of the inferior mesenteric artery with no significant stenosis. 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 There is mild tortuosity and dilatation of the proximal right common iliac artery measuring up to 1.6 cm. There is a weblike atherosclerotic plaque within the proximal right common iliac artery with no   significant right common iliac artery stenosis. Atherosclerotic calcifications are identified elsewhere within the external and internal iliac arteries with no other significant stenosis.     PELVIS: There is mild to moderate nonspecific heterogeneous enlargement of the prostate gland. The bladder is unremarkable with no significant bladder wall thickening. Normal caliber pelvic bowel loops with no evidence of bowel wall thickening. Normal   caliber bowel with no evidence of bowel wall thickening. Sigmoid colon diverticulosis is present with no evidence of diverticulitis. The appendix is normal. There is no evidence of free fluid or lymphadenopathy throughout the abdomen or pelvis. There is   a redemonstrated grade 1 anterolisthesis of L4 on L5 with bilateral L5 pars defects. Severe diffuse moderate to severe diffuse lumbar spondylosis and degenerative facet disease are again identified. The bone structures otherwise unremarkable with no   lytic or sclerotic bone lesions.     IMPRESSION:   1. 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 This finding was discussed by phone with Dr. Hernandez. Conventional angiography is recommended for further evaluation.   2. Extensive atherosclerotic plaque and extensive  atherosclerotic calcifications throughout the abdominal aorta and iliac vessels. No evidence of aortic dissection or intramural hematoma. There is mild tortuosity and dilatation of the right common iliac   artery with a weblike plaque. No significant right common iliac artery narrowing is appreciated.   3. Nonspecific linear atelectasis versus scarring at the lung bases bilaterally.   4. Incidental findings include mild to moderate nonspecific heterogeneous enlargement of the prostate glands, sigmoid colon diverticulosis, a redemonstrated grade 1 anterolisthesis of L4 on L5 with bilateral L5 pars defects, and moderate to severe   diffuse lumbar spondylosis which is better evaluated on on the lumbar spine MRI study.     Interpreting Radiologist:     Dave Pereira M.D.   Electronically Signed: 05/13/2022 04:08 PM    Exam: MRI LUMBAR SP W/O CONTRAST   CPT Code(s): 62297 - MRI LUMBAR SPINE W/O DYE     MRI LUMBAR SPINE WITHOUT CONTRAST     HISTORY: Low back pain, unspecified     COMPARISON: None currently available.     TECHNIQUE: Routine MRI exam performed on a wide bore ultra high field 3.0 T Siemens Skyra MR scanner, without intravenous contrast.     FINDINGS: Convex left curvature of the lumbar spine measuring 16 degrees. Vertebral body heights are maintained. Grade 1 retrolisthesis at the L4-5 level. Grade 1 anterolisthesis at the L5-S1 level. Multilevel discogenic reactive marrow changes. The tip   of the conus medullaris is at the L1 level. Visualized spinal cord and cauda equina have a normal appearance. Paraspinous musculature appears demonstrate mild to moderate fatty atrophy. Multilevel atherosclerosis of the abdominal aorta. In the distal   aorta there crescentic areas of increased signal on T1-weighted sequences which may represent calcification however the possibility of an intramural hemorrhage cannot be excluded.     T11-12: Disc degeneration characterized by disc desiccation and a broad posterior  disc bulge. Bilateral facet arthropathy. No central canal stenosis. Moderate to severe right and mild left neural foraminal narrowing.     T12-L1: Disc degeneration characterized by disc desiccation, 30-60% loss of disc height and a shallow posterior disc bulge with a superimposed right subarticular protrusion measuring 3 to 4 mm AP dimension. No significant central canal stenosis. Mild   right and no left neural foraminal narrowing. Mild bilateral facet arthropathy.     L1-2: Disc degeneration characterized by disc desiccation and a shallow posterior disc bulge. Disc height is maintained. Mild facet arthropathy and ligamentum flavum hypertrophy. No significant central canal stenosis or neural foraminal narrowing.     L2-3: Disc degeneration characterized by disc desiccation, 30-60% loss of disc height and a broad posterior disc bulge. Mild to moderate ligamentum flavum hypertrophy and mild facet arthropathy. No central canal stenosis. Mild bilateral neural foraminal   narrowing.     L3-4: Disc degeneration characterized by disc desiccation, 30-60% loss of disc height and a broad posterior disc bulge. Mild to moderate ligamentum flavum hypertrophy and mild facet arthropathy. No central canal stenosis. Mild bilateral neural foraminal   narrowing.     L4-5: Grade 1 retrolisthesis. Disc degeneration characterized by disc desiccation, greater than 60% loss of disc height and a broad posterior disc bulge. Dorsal annular fissure. Mild facet arthropathy and ligamentum flavum hypertrophy. Narrowing of the   left subarticular recess with encroachment on the left L5 nerve root. Moderate left neural foraminal narrowing. Mild right neural foraminal narrowing.     L5-S1: Grade 1 anterolisthesis. Disc degeneration characterized by disc desiccation and a broad posterior disc bulge. Mild facet arthropathy and ligamentum flavum hypertrophy. No central canal stenosis. Moderate to severe right and severe left neural   foraminal  narrowing.     IMPRESSION:   1. L5-S1: Grade 1 anterolisthesis combined with multifactorial degenerative changes results in moderate to severe right and severe left neural foraminal narrowing. No central canal stenosis.   2. L4-5: Grade 1 retrolisthesis combined with multifactorial degenerative changes results in marked narrowing of the left subarticular recess with encroachment on the left L5 nerve root, moderate left neural foraminal narrowing and mild right neural   foraminal narrowing.   3. L3-4: Mild bilateral neural foraminal narrowing. No central canal stenosis.   4. L2-3: Mild bilateral neural foraminal narrowing. No central canal stenosis.   5. Diffuse aortic atherosclerosis. Crescentic area of increased signal on T1 sequences in the distal aorta may represent calcification, however, an intramural hemorrhage cannot be excluded based on this exam. A CTA of the abdominal aorta is recommended   for further evaluation. This finding and recommendations were discussed with Dr. Hernandez at the time of this interpretation with voiced understanding.     Interpreting Radiologist:     Armani Grimes M.D.   Electronically Signed: 05/13/2022 09:06 AM    DATE OF SERVICE: 12.23.2021   SACROILIACS, 1 FRONTAL AND 2 OBLIQUE VIEWS     CLINICAL INFORMATION:  Psoriatic arthritis (HCC).     COMPARISON STUDY: None.     FINDINGS:       There are no definite acute fractures or dislocations.     No radiographic signs of ankylosis/fusion or significant degenerative disease.     No bony lesions are identified.     Diffuse osteopenia is present.     Impression   IMPRESSION:     Unremarkable radiographic examination of the sacroiliac joints.      DATE OF SERVICE: 12.23.2021   LUMBAR SPINE AP, LATERAL, FLEXION, AND EXTENSION VIEWS     CLINICAL INDICATION: Chronic left-sided low back pain     COMPARISON: None     FINDINGS: Routine views of the lumbar spine show 5 lumbar-type vertebral bodies.     There is levoscoliosis of the thoracolumbar  spine. There is grade 1 spondylolisthesis of L5 on S1.   Otherwise, overall alignment of the lumbar spine is within normal limits.. The vertebral body   heights appear well-maintained. The visualized pedicles appear grossly intact.     No definite fracture or subluxation is seen. Flexion and extension views demonstrate no significant   abnormal movements.     Decreased disc height, endplate osteophytes, and/or endplate sclerotic changes consistent with   degenerative spondylosis. Posterior facet arthropathy notable at the mid to lower lumbar spine and   particularly at the lumbosacral junction.     If there is continued clinical concern, cross-sectional imaging may be considered depending on the   remainder of the patient's clinical situation.       Extensive arteriovascular calcifications of the abdominal aorta noted.     Impression   IMPRESSION:   Moderate to severe lumbar spondylosis including degenerative disc disease and posterior facet   arthropathy as detailed above.        PROCEDURE:  MRI BRAIN MRA HEAD+MRA NECK (ALL W+WO) (CPT=70553/94501/51432)       COMPARISON:  None.       INDICATIONS:  eval CVA       TECHNIQUE:  MRI of the brain was performed with multi-planar T1, T2-weighted images with FLAIR sequences and diffusion weighted images without and with infusion.  MR angiography of the brain without and with infusion and MR angiography of the neck   without and with infusion was performed using 3D time of flight, multi-planar and 3D reconstructed images. All measurements obtained in this exam were performed using NASCET criteria.       PATIENT STATED HISTORY:(As transcribed by Technologist)  Patient with unsteady gait.        CONTRAST USED:  14 mL of Dotarem       FINDINGS:        MRI BRAIN   The ventricles and sulci are normal in size for the patient's age with mild atrophy present not uncommon for the age of 81.  No mass-effect, midline shift, hydrocephalus, herniation, extra-axial fluid collections, or  signs of acute territorial infarct,   or brain tumor.       No abnormality of midline structures. No sign of restricted diffusion. No pathologic gradient susceptibility pattern.       Flow voids are present within the internal carotid and basilar arteries. No sign of acute fluid in the paranasal sinuses.       With contrast infusion, there is no abnormal enhancement pattern identified.       MRA HEAD AND NECK       Patency of the carotid and vertebral arterial structures of the neck, with no signs of occlusion, high-grade stenosis, acute thrombosis, acute dissection involving the common carotid, internal carotid, external carotid, vertebral arteries bilaterally.     Both vertebral arteries join forming basilar artery.  The basilar artery is patent.       Intracranial circulation demonstrates patency of the intracranial ICA bilaterally, the right and left anterior, middle, and right posterior cerebral arteries without evidence for high-grade stenosis, occlusion, or other acute abnormality.  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  No signs of occlusion.  No restricted diffusion or other features for acute infarct.         Dictated by (CST): Gio Trammell MD on 6/20/2021 at 4:36 PM       Finalized by (CST): Gio Trammell MD on 6/20/2021 at 4:45 PM       PROCEDURE:  XR CHEST AP PORTABLE  (CPT=71045)       TECHNIQUE:  AP chest radiograph was obtained.       COMPARISON:  [facility], CHEST AP PORT, 3/26/2011, 10:00 AM.       INDICATIONS:  dizziness and feeling unsteady on feet with nausea starting at 4am, vomited a couple hours ago       PATIENT STATED HISTORY: (As transcribed by Technologist)  Patient stated having symptoms of dizziness.            FINDINGS:  The heart is borderline in size.  The lungs are clear of  acute-appearing disease process.  The costophrenic angles are sharp.  There is no active disease seen on the basis of portable radiography.                        Impression   CONCLUSION:  Borderline heart size. No active disease seen.           Dictated by (CST): Gio Trammell MD on 6/20/2021 at 3:46 PM       Finalized by (CST): Gio Trammell MD on 6/20/2021 at 3:47 PM      DATE OF SERVICE: 04.22.2021     LEFT FOOT  AP, LATERAL AND OBLIQUE (3 VIEWS)   CLINICAL INFORMATION:  Left foot pain.   COMPARISON STUDY: Left foot, 12/6/2020.   FINDINGS:   No acute fracture or dislocation is seen.   Diffuse osteopenia is present.   The joint spaces are essentially maintained.   The lateral view shows a small inferior calcaneal exostosis (plantar calcaneal spur)    DATE OF SERVICE: 01.28.2021     XR HAND (MIN 3 VIEWS), LEFT (CPT=73130), XR HAND (MIN 3 VIEWS), RIGHT (CPT=73130), XR WRIST COMPLETE   (MIN 3 VIEWS), LEFT (CPT=73110), XR WRIST COMPLETE (MIN 3 VIEWS), RIGHT (CPT=73110)   CLINICAL INDICATION: Bilateral hand swelling. Elevated sedimentation rate and CRP. History of   psoriasis.   COMPARISON STUDIES: None available.   FINDINGS:   Right hand: There is no evidence of acute fracture or dislocation. The MCP joints and   interphalangeal joints of the hand are preserved. No degenerative osteophytes or discrete osseous   erosions are seen at these joints.   Left hand: There is no evidence of acute fracture or dislocation. The MCP joints and interphalangeal   joints of the hand are preserved. No degenerative osteophytes or discrete osseous erosions are seen   at these joints.   Right wrist: There is no evidence of acute fracture or dislocation. There is mild narrowing of the   thumb CMC joint, with mild marginal osteophyte formation. All other joint spaces of the wrist are   preserved. No discrete osseous erosions are identified.   Left wrist: There is no evidence of acute fracture or dislocation. There is mild narrowing of  the   thumb CMC joint, with mild marginal osteophyte formation. All other joint spaces of the wrist are   preserved. No discrete osseous erosions are identified.   Impression   IMPRESSION:   1. 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 Otherwise, no significant arthritic   changes are seen in the bilateral hands and wrists.     DATE OF SERVICE: 01.01.2021     CT CHEST   CLINICAL INDICATION: Follow-up abnormal chest CT   COMPARISON: Chest CT 5/1/2020   TECHNIQUE: 1.25mm thick axial images were obtained through the chest. Coronal reconstructions were   included.   Automated exposure control and ALARA manual techniques for patient specific dose reduction were   followed while maintaining the necessary diagnostic image quality.   FINDINGS:     Lungs and large airways: A nodular density previously seen in the superior segment of the right   lower lobe is no longer visible. Multiple additional scattered small 1 to 2 mm nodular densities are   seen. A groundglass attenuation density measuring approximately 5 mm on image 55 in the right upper   lobe is stable. A 3 mm nodule in the right upper lobe on image 69 looks new, and may be a mucous   filled bronchus. A subpleural 3 mm nodular density in the left lower lobe on image 168 is stable.   There is platelike atelectasis or scarring in the lingula which is slightly more pronounced than   previously. Diffuse underlying emphysematous changes are present. There are platelike areas of   scarring or atelectasis in both lung bases. Biapical pleural parenchymal scarring is present.   Pleura:  Normal. No pleural effusion or thickening.   Heart and pericardium:  There are calcifications in the plane of the aortic valve, suggestive of   calcific aortic stenosis..   Mediastinum and dayan:  Normal.   Chest wall and lower neck:  Normal.   Vessels:  Atherosclerotic changes in the aorta and coronary arteries.   Bones:  Multilevel degenerative changes are present in the spine.  Postoperative changes are noted in   the left hemithorax.   Upper abdomen: There is a small hiatal hernia.   Impression   IMPRESSION:   1. Emphysema.   2. Multiple small nodular densities in the lungs measuring up to 3 mm as described above.   3. Atherosclerosis and possible calcific aortic stenosis     Labs:  Lab Results   Component Value Date    WBC 8.8 11/05/2024    RBC 4.07 11/05/2024    HGB 13.3 11/05/2024    HCT 37.8 (L) 11/05/2024    .0 11/05/2024    MPV 9.9 03/26/2011    MCV 92.9 11/05/2024    MCH 32.7 11/05/2024    MCHC 35.2 11/05/2024    RDW 13.2 11/05/2024    NEPRELIM 6.58 11/04/2024    NEPERCENT 64.0 11/04/2024    LYPERCENT 17.5 11/04/2024    MOPERCENT 15.3 11/04/2024    EOPERCENT 2.5 11/04/2024    BAPERCENT 0.4 11/04/2024    NE 6.58 11/04/2024    LYMABS 1.80 11/04/2024    MOABSO 1.57 (H) 11/04/2024    EOABSO 0.26 11/04/2024    BAABSO 0.04 11/04/2024     Lab Results   Component Value Date     (H) 11/06/2024    BUN 14 11/06/2024    BUNCREA 17.1 12/02/2022    CREATSERUM 0.90 11/06/2024    ANIONGAP 6 11/06/2024    GFR >59 04/23/2010    GFRNAA 64 01/13/2022    GFRAA 74 01/13/2022    CA 9.2 11/06/2024    OSMOCALC 275 11/06/2024    ALKPHO 49 11/06/2024    AST 16 11/06/2024    ALT 12 11/06/2024    BILT 0.6 11/06/2024    TP 6.1 11/06/2024    ALB 3.9 11/06/2024    GLOBULIN 2.2 11/06/2024    AGRATIO 2.3 12/08/2014     (L) 11/06/2024    K 4.3 11/06/2024    K 4.3 11/06/2024    CL 98 11/06/2024    CO2 28.0 11/06/2024       Additional Labs:  10/2024  ESR 20 normal  CRP normal  C3 96.4 normal  C4 13.1 normal   Myositis ab panel negative  SOO screen negative  Mag 1.9 normal  Vit D 81.6 normal    06/2024  ESR 15 normal  CRP normal      02/2024  ESR 18 normal  CRP normal  Vitamin D 64.1 normal  CMP grossly normal  CBC grossly normal  B12 503 normal  B6 normal  B1 normal  Phosphorus 2.7 normal  Mag 2.2 normal  QuantiFERON-TB negative    12/2022   Mag 2.1 normal  B12 677 normal   Vit D 65.6 normal   Tsh  normal    ESR 5 normal  CRP normal   Uric acid 5.0     01/2022  SOO 1: 160 AMA  SOO 1: 160 speckled  dsDNA, Smith, histone negative  Remainder of EDMOND panel negative    05/2021  ESR 11  CRP normal     03/2021  ESR 9  CRP normal     01/2021  SOO 1:80  ESR 49 elevated  CRP 2.23 elevated  TB negative     12/2020  Uric acid 4.5  CCP neg  RF neg  SSA neg  SSB neg  ESR 36   CRP 1.94 elevated    Leslee David, DO  [facility]  01/15/2025

## 2025-03-17 ENCOUNTER — TELEPHONE (OUTPATIENT)
Facility: CLINIC | Age: 85
End: 2025-03-17

## 2025-03-17 NOTE — TELEPHONE ENCOUNTER
Called pt and left a detailed voicemail stating he can get lab work done now including the TB test. Sending my chart message as well.

## 2025-03-20 ENCOUNTER — LAB ENCOUNTER (OUTPATIENT)
Dept: LAB | Facility: HOSPITAL | Age: 85
End: 2025-03-20
Attending: INTERNAL MEDICINE
Payer: MEDICARE

## 2025-03-20 DIAGNOSIS — L40.50 PSORIATIC ARTHRITIS (HCC): ICD-10-CM

## 2025-03-20 DIAGNOSIS — Z79.899 IMMUNOCOMPROMISED STATE DUE TO DRUG THERAPY (HCC): ICD-10-CM

## 2025-03-20 DIAGNOSIS — R53.82 CHRONIC FATIGUE: ICD-10-CM

## 2025-03-20 DIAGNOSIS — E55.9 VITAMIN D DEFICIENCY: ICD-10-CM

## 2025-03-20 DIAGNOSIS — L40.0 PLAQUE PSORIASIS: ICD-10-CM

## 2025-03-20 DIAGNOSIS — Z11.1 SCREENING FOR TUBERCULOSIS: ICD-10-CM

## 2025-03-20 DIAGNOSIS — D84.821 IMMUNOCOMPROMISED STATE DUE TO DRUG THERAPY (HCC): ICD-10-CM

## 2025-03-20 LAB
ALBUMIN SERPL-MCNC: 4.7 G/DL (ref 3.2–4.8)
ALBUMIN/GLOB SERPL: 1.9 {RATIO} (ref 1–2)
ALP LIVER SERPL-CCNC: 62 U/L
ALT SERPL-CCNC: 14 U/L
ANION GAP SERPL CALC-SCNC: 4 MMOL/L (ref 0–18)
AST SERPL-CCNC: 19 U/L (ref ?–34)
BASOPHILS # BLD AUTO: 0.04 X10(3) UL (ref 0–0.2)
BASOPHILS NFR BLD AUTO: 0.5 %
BILIRUB SERPL-MCNC: 1.5 MG/DL (ref 0.2–1.1)
BUN BLD-MCNC: 16 MG/DL (ref 9–23)
CALCIUM BLD-MCNC: 9.8 MG/DL (ref 8.7–10.6)
CHLORIDE SERPL-SCNC: 99 MMOL/L (ref 98–112)
CO2 SERPL-SCNC: 34 MMOL/L (ref 21–32)
CREAT BLD-MCNC: 1.08 MG/DL
EGFRCR SERPLBLD CKD-EPI 2021: 67 ML/MIN/1.73M2 (ref 60–?)
EOSINOPHIL # BLD AUTO: 0.11 X10(3) UL (ref 0–0.7)
EOSINOPHIL NFR BLD AUTO: 1.4 %
ERYTHROCYTE [DISTWIDTH] IN BLOOD BY AUTOMATED COUNT: 13.6 %
FASTING STATUS PATIENT QL REPORTED: NO
GLOBULIN PLAS-MCNC: 2.5 G/DL (ref 2–3.5)
GLUCOSE BLD-MCNC: 96 MG/DL (ref 70–99)
HCT VFR BLD AUTO: 45.7 %
HGB BLD-MCNC: 15.9 G/DL
IMM GRANULOCYTES # BLD AUTO: 0.02 X10(3) UL (ref 0–1)
IMM GRANULOCYTES NFR BLD: 0.2 %
LYMPHOCYTES # BLD AUTO: 1.58 X10(3) UL (ref 1–4)
LYMPHOCYTES NFR BLD AUTO: 19.5 %
MAGNESIUM SERPL-MCNC: 2 MG/DL (ref 1.6–2.6)
MCH RBC QN AUTO: 32 PG (ref 26–34)
MCHC RBC AUTO-ENTMCNC: 34.8 G/DL (ref 31–37)
MCV RBC AUTO: 92 FL
MONOCYTES # BLD AUTO: 1.01 X10(3) UL (ref 0.1–1)
MONOCYTES NFR BLD AUTO: 12.5 %
NEUTROPHILS # BLD AUTO: 5.34 X10 (3) UL (ref 1.5–7.7)
NEUTROPHILS # BLD AUTO: 5.34 X10(3) UL (ref 1.5–7.7)
NEUTROPHILS NFR BLD AUTO: 65.9 %
OSMOLALITY SERPL CALC.SUM OF ELEC: 285 MOSM/KG (ref 275–295)
PLATELET # BLD AUTO: 187 10(3)UL (ref 150–450)
POTASSIUM SERPL-SCNC: 4.2 MMOL/L (ref 3.5–5.1)
PROT SERPL-MCNC: 7.2 G/DL (ref 5.7–8.2)
RBC # BLD AUTO: 4.97 X10(6)UL
SODIUM SERPL-SCNC: 137 MMOL/L (ref 136–145)
VIT D+METAB SERPL-MCNC: 85.2 NG/ML (ref 30–100)
WBC # BLD AUTO: 8.1 X10(3) UL (ref 4–11)

## 2025-03-20 PROCEDURE — 86480 TB TEST CELL IMMUN MEASURE: CPT

## 2025-03-20 PROCEDURE — 85025 COMPLETE CBC W/AUTO DIFF WBC: CPT

## 2025-03-20 PROCEDURE — 83735 ASSAY OF MAGNESIUM: CPT

## 2025-03-20 PROCEDURE — 80053 COMPREHEN METABOLIC PANEL: CPT

## 2025-03-20 PROCEDURE — 82306 VITAMIN D 25 HYDROXY: CPT

## 2025-03-20 PROCEDURE — 36415 COLL VENOUS BLD VENIPUNCTURE: CPT

## 2025-03-24 LAB
M TB IFN-G CD4+ T-CELLS BLD-ACNC: 0.04 IU/ML
M TB TUBERC IFN-G BLD QL: NEGATIVE
M TB TUBERC IGNF/MITOGEN IGNF CONTROL: >10 IU/ML
QFT TB1 AG MINUS NIL: 0.01 IU/ML
QFT TB2 AG MINUS NIL: -0.01 IU/ML

## 2025-05-27 ENCOUNTER — OFFICE VISIT (OUTPATIENT)
Dept: RHEUMATOLOGY | Facility: CLINIC | Age: 85
End: 2025-05-27
Payer: MEDICARE

## 2025-05-27 VITALS
WEIGHT: 136 LBS | BODY MASS INDEX: 21.86 KG/M2 | RESPIRATION RATE: 16 BRPM | HEART RATE: 72 BPM | TEMPERATURE: 98 F | SYSTOLIC BLOOD PRESSURE: 142 MMHG | DIASTOLIC BLOOD PRESSURE: 70 MMHG | HEIGHT: 66 IN

## 2025-05-27 DIAGNOSIS — Z79.899 IMMUNOCOMPROMISED STATE DUE TO DRUG THERAPY (HCC): ICD-10-CM

## 2025-05-27 DIAGNOSIS — D84.821 IMMUNOCOMPROMISED STATE DUE TO DRUG THERAPY (HCC): ICD-10-CM

## 2025-05-27 DIAGNOSIS — L40.50 PSORIATIC ARTHRITIS (HCC): Primary | ICD-10-CM

## 2025-05-27 DIAGNOSIS — Z79.899 HIGH RISK MEDICATION USE: ICD-10-CM

## 2025-05-27 DIAGNOSIS — M50.30 DDD (DEGENERATIVE DISC DISEASE), CERVICAL: ICD-10-CM

## 2025-05-27 DIAGNOSIS — R53.82 CHRONIC FATIGUE: ICD-10-CM

## 2025-05-27 DIAGNOSIS — L40.0 PLAQUE PSORIASIS: ICD-10-CM

## 2025-05-27 DIAGNOSIS — E55.9 VITAMIN D DEFICIENCY: ICD-10-CM

## 2025-05-27 PROCEDURE — 99214 OFFICE O/P EST MOD 30 MIN: CPT | Performed by: INTERNAL MEDICINE

## 2025-05-27 PROCEDURE — G2211 COMPLEX E/M VISIT ADD ON: HCPCS | Performed by: INTERNAL MEDICINE

## 2025-05-27 RX ORDER — FEXOFENADINE HCL 180 MG/1
180 TABLET ORAL DAILY
COMMUNITY

## 2025-05-27 NOTE — PROGRESS NOTES
RHEUMATOLOGY FOLLOW UP   Date of visit: 05/27/2025  ?  Chief Complaint   Patient presents with    Psoriatic Arthritis     4 month f/u. Feeling pretty good. Off and on joint pain. Skin is about the same with some rashes that take longer to go away. No falls or dizziness. Converted rapid score of 2.7       ASSESSMENT, DISCUSSION & PLAN   Assessment:  1. Psoriatic arthritis (HCC)    2. Plaque psoriasis    3. Immunocompromised state due to drug therapy (HCC)    4. Chronic fatigue    5. Vitamin D deficiency    6. DDD (degenerative disc disease), cervical    7. High risk medication use          Discussion:  Mr. Mohamud Banegas is a 86 yo man who initially presented for second opinoin regarding his joint pain and skin. States he has had a long standing history of psoriasis for over 40 years but was fairly mild until recently. He was diagnosed with seronegative RA in 2019 and started on plaquenil and prednisone with some relief of symptoms. Starting this spring/summer, he has had significant worsened skin rashes. He was evaluated by dermatology who dx pt with psoriasis. Tried otezla which seemed to work initially, however had continued skin rash. He presents for second opinion. At his initial visit, he had minimal synovitis of the joints of the hands, no signs of tenosynovitis, positive nail pitting of fingernails diffusely with some onycholysis. He does have left sided SI joint tenderness.     Since that time, he was started on cosentyx and doing well. He has tolerated so far without obvious side effects.   At a prior visit, he felt his skin worsened so we increased dosage to 300mg every 4 weeks instead of the 150mg. He continues to report active skin disease (prior exams showed dry skin but no active psoriasis). He denies joint pain or swelling like previously.   His main complaint revolves around neck/lower back  Previously recommended he start formal PT since he admits to not doing an HEP as previously  discussed.  Previously also recommended he consider pain management again, although he has been seen before and does not seem interested in injections. Pt has significant DDD of the cervical spine with mild spinal stenosis but moderate to severe neural foraminal stenosis in various areas. Discussed previously that these findings may be contributing to his weakness.     Given his improvement of symptoms overall, he will continue with cosentyx at 150mg every 4 weeks. If concern for infection, okay to take every 5-6 weeks and take tylenol as needed for pain in the weeks leading up to the injection.  Prior cough improved after treatment of pneumonia and holding cosentyx one month.   Okay to continue topicals prn for the skin, although recently told areas of concern not active psoriasis.   Continues following with pulmonology and other specialists    Follow up in 3-4 months or sooner as needed  Strongly recommended he do exercises for the neck and work on posture daily.     Patient and pt's wife verbalized understanding of above instructions. No further questions at this time.    Code selection for this visit was based on time spent (30min) on date of service in preparing to see the patient, obtaining and/or reviewing separately obtained history, performing a medically appropriate examination, counseling and educating the patient/family/caregiver, ordering medications or testing, referring and communicating with other healthcare providers, documenting clinical information in the E HR, independently interpreting results and communicating results to the patient/family/caregiver and care coordination with the patient's other providers.    ?  Plan:  Diagnoses and all orders for this visit:    Psoriatic arthritis (HCC)  -     CBC With Differential With Platelet; Future  -     Comp Metabolic Panel (14); Future  -     Vitamin D; Future  -     Magnesium; Future    Plaque psoriasis  -     CBC With Differential With Platelet;  Future  -     Comp Metabolic Panel (14); Future  -     Vitamin D; Future  -     Magnesium; Future    Immunocompromised state due to drug therapy (HCC)  -     CBC With Differential With Platelet; Future  -     Comp Metabolic Panel (14); Future  -     Vitamin D; Future  -     Magnesium; Future    Chronic fatigue  -     Vitamin D; Future  -     Magnesium; Future    Vitamin D deficiency  -     Vitamin D; Future  -     Magnesium; Future    DDD (degenerative disc disease), cervical    High risk medication use                    Return in about 4 months (around 9/27/2025).  ?  HPI   Mohamud Banegas is a 85 year old male with the following active problems who was seen initially for second opinion regarding his psoriasis/psoriatic arthritis. He was previously on Otezla and is now on Cosentyx. He presents for follow up today.      Since his last visit, he has been okay.  Denies recent hospitalizations. Denies any falls. Denies dizziness.   BP is better than before. But did have a spike a few days ago (160/80s) but attributes to family stressors. Still off antihypertensive.   Has no longer having any severely low oxygen levels but states can be 92-94 and will use the oxygen attenuator as needed.   Denies recent coughing like before.   Taking pantoprazole daily and takes famotidine as needed at night.   Denies significant neck stiffness like before. Is doing some home exercises but not daily.   Wife mentioned before he's more bent over from the arthritis.     Continues with cosentyx 150mg every 4 weeks.   States with the last injection- did 5 weeks due to concerns from prior pneumonia. States he did have some worsened knee pain (left) that improved with the injection.   Denies recent infections.   Denies significant joint pain (primarily in the knees), states manageable.   Denies overt swelling of the joints.   + morning stiffness, states some balance issues when first getting up, uses help from his wife, Jada.     Seen by  dermatology a few weeks ago, had a few spots frozen off. And told other areas of rashes/concern not psoriasis. Using topicals as needed. States has one spot over the right neck that can be quite itchy. Can even get a hard nodule- starts out as a point, particularly the elbows and back, can rub hard enough and then gets more itchy. Does use cerave nightly.       Working on walking daily, tries to get at least 3000 steps per day. States not walking great due more to balance than pain. Using walking stick regularly   Not sleeping as much, still with difficulty falling asleep. Taking lorazepam as needed. Also has sleep number bed that tracks his sleep- and can tell him the quality of sleep. Averages 6-7 hours but rarely higher than that of restful sleeps   Is taking magnesium glycinate 250mg nightly. Is interested in trying magnesium citrate instead. Does have constipation     Continues K2 100mg, vitamin d 5000IU supplementation.   Feels his mouth is better now. Not brushing teeth regularly and still not getting plaque. Has plans to brush more with a fluoride free toothpaste.        Previously:   Seen at [facility]- had scope done which did not show aspiration. Told \"something sticking out of trachea\" but not likely contributing.   Was seen by spine surgeon previously- told he would've recommended surgery. This was after the fall last year.   CT neck in 07/2023- showed hematoma in the left paraspinal muscle and fracture at tip of C4 spinous process  MRI cervical in 10/2023-  showed multiple things including \"C5-6 facet joint diastases with interspinous ligamentous injury and multilevel cervical spondylosis with spinal stenosis. Advanced stenosis involving foraminal levels c6-7 and to lesser degree c3-c4 through c5-6. There is mild multilevel central canal stenosis involving c3-4 through c5-6.\"  2nd opinion spine surgeon did not recommend surgery (prior)  Seen by ENT previously- some submandibular swelling- told  no change in management and per pt not concerned.      HPI from initial consultation  referred for rheumatologic evaluation due to second opinion regarding psoriasis/psoriatic arthritis.     Has suffered from plaque psoriasis for several years (over 40 years). Initially was affecting the hands and fingernail and over time affected other areas diffusely.    Then around 2019, he he suffered a wound on the left leg, states he had swelling in the foot/leg. Had DVT ruled out and eventually was diagnosed with arthritis by his PCP. Was placed on low dose prednisone with no relief, then when placed on 20mg had improvement with higher dose.   Had pain/swelling in the left foot, then the right foot then both hands and wrists. Was so difficult he couldn't even turn a key.   Was evaluated by another rheumatologist and was diagnosed with seronegative rheumatoid arthritis. Had recommended methotrexate but pt did research and did not want to take medication. Was placed on hydroxychloroquine in March of 2020. Has been taking since that time. States seemed like the medication helped with most of the joint pain with exception of the feet being swollen.  Swelling does improve with elevating the legs.   Had difficulty walking up/down the stairs due to the \"arthritis\" pt just states different areas bothering him.  Had significant flare of psoriasis over the summer and was referred to dermatologist. Was given different topicals  Was given samples of Otezla per pt preference. Started on starter pack but was not able to get approved through insurance. After completing sample pack, he was able to get generic from [place] but still having skin flaring while on the generic.  Was seen by dermatology- Dr. Mccann again, and started prednisone (as high as 40mg) and did another course of steroids. Had biopsy of a few lesions and is waiting to find pathology.   Has lesions over belly, chest, back, arms, and legs. States very itchy.     Denies current  joint pain, but taking prednisone currently. States was having some wrist and hand pain when off the steroids.   + fingernail changes from psoriasis  + middle finger PIPs have ramsey swollen  + chronic contracture of the left pinky - did have surgery but had recurrence.     + morning stiffness and difficult walking, lasted throughout the day when severe, but much better when on otezla alone   + altered gum sensation and some pain  + hx of problem with his eye, inflamed per pt. Saw Dr. Swann, required steroid eye drops and topical abx (a few months ago)  + hx of pancreatitis- thought related to pancreatic divisum; and elevated cholesterol   + constipation while on plaquenil resolved since that time  + some left sided low back pain, typically at night and can be difficult to get into a comfortable position  + difficulty falling asleep at night- take ativan; was given mirtazapine but isn't helping. Not necessarily due to pain itself.   + dry mouth throughout the day, improved with prednisone, also drinking a lot of water       Hx of SVT s/p ablation  Some difficulty swallowing typically when laying hardik  Hx of KI   Hx of diverticulosis without hx of diverticulitis       The patient denies oral or nasal ulcers, Raynaud's phenomenon, prior hematologic abnormality, prior renal or liver disease, or history of seizures.  No history of prior blood clot in the legs or lungs, strokes or ischemic phenomenon.  Denies nonhealing ulcers on the fingertips.  The patient denies any crampy abdominal pain, diarrhea, bloody stools, nodular painful shin bruises, Achilles heel pain or symptoms of enthesitis.  There are no symptoms of severe dry eyes.  No fevers, chills, lymphadenopathy, unexpected weight loss,      Past Medical History:  Past Medical History:    Allergic rhinitis    COPD    COPD (chronic obstructive pulmonary disease) (HCC)    Empyema    GERD    GERD (gastroesophageal reflux disease)    Heart disease    High cholesterol     OTHER DISEASES    PVD    Pancreas divisum    Pancreatitis x 1 in 5/10    Rotator cuff tear    RIGHT SHOULDER    Seronegative rheumatoid arthritis (HCC)    SLEEP APNEA    AHI 15, REM 25, amirah 88%    Spermatocele     Past Surgical History:  Past Surgical History:   Procedure Laterality Date    Colonoscopy  > 10 years ago    Ndsc ablation & rcnstj [facility] w/o bypass      Repair ing hernia,5+y/o,reducibl  1952     Family History:  Family History   Problem Relation Age of Onset    Other (Other) Father         COPD, CKD    Cancer Mother         breast     Social History:  Social History     Socioeconomic History    Marital status:    Occupational History    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   Tobacco Use    Smoking status: Former     Current packs/day: 0.00     Average packs/day: 1 pack/day for 63.7 years (63.7 ttl pk-yrs)     Types: Cigarettes     Start date: 1955     Quit date: 2018     Years since quittin.7    Smokeless tobacco: Never   Vaping Use    Vaping status: Never Used   Substance and Sexual Activity    Alcohol use: Not Currently     Alcohol/week: 0.0 standard drinks of alcohol     Comment: very rare    Drug use: No   Other Topics Concern    Occupational Exposure Yes     Comment: steel mill, [employer] dust    Exercise Yes     Comment: walks about 1 mile at least 2 -3 times a week     Social Drivers of Health     Food Insecurity: No Food Insecurity (11/3/2024)    Food Insecurity     Food Insecurity: Never true   Transportation Needs: No Transportation Needs (11/3/2024)    Transportation Needs     Lack of Transportation: No   Housing Stability: Low Risk  (11/3/2024)    Housing Stability     Housing Instability: No     Medications:  Outpatient Medications Marked as Taking for the 25 encounter (Office Visit) with Leslee Hernandez DO   Medication Sig Dispense Refill    fexofenadine 180 MG Oral Tab Take 1 tablet (180 mg total) by mouth daily.      pantoprazole 40 MG Oral Tab EC Take 1  tablet (40 mg total) by mouth daily.      famotidine 20 MG Oral Tab Take 1 tablet (20 mg total) by mouth As Directed.      Secukinumab (COSENTYX SENSOREADY PEN) 150 MG/ML Subcutaneous Solution Auto-injector Inject 150 mg into the skin Every 1 (one) months.      ipratropium-albuterol 0.5-2.5 (3) MG/3ML Inhalation Solution Inhale 3 mL into the lungs 4 (four) times daily as needed.      Menaquinone-7 (VITAMIN K2) 100 MCG Oral Cap Take 1 capsule by mouth daily.      clobetasol 0.05 % External Cream Apply to affected area BID for a week then wait at least a week to apply again 60 g 2    mupirocin 2 % External Ointment Apply 1 Application topically 3 (three) times daily.      Fluticasone Furoate (FLONASE SENSIMIST) 27.5 MCG/SPRAY Nasal Suspension 2 sprays by Nasal route daily.      omega-3 fatty acids 1000 MG Oral Cap Take 1,000 mg by mouth daily.      LORAZEPAM 1 MG Oral Tab TAKE 1 TABLET(1 MG) BY MOUTH TWICE DAILY AS NEEDED FOR ANXIETY 30 tablet 0    triamcinolone 0.1 % External Cream       fluticasone-umeclidin-vilant (TRELEGY ELLIPTA) 100-62.5-25 MCG/INH Inhalation Aerosol Powder, Breath Activated Inhale 1 puff into the lungs daily. 3 each 3    ZINC OR Take 25 mg by mouth.      Montelukast Sodium (SINGULAIR) 10 MG Oral Tab Take 1 tablet (10 mg total) by mouth daily. 90 tablet 3    Albuterol Sulfate HFA (PROAIR HFA) 108 (90 BASE) MCG/ACT Inhalation Aero Soln Inhale 2 puffs into the lungs every 6 (six) hours as needed for Wheezing. 1 Inhaler 0    magnesium 250 MG Oral Tab Take 1 tablet (250 mg total) by mouth as needed.      VITAMIN D 1000 UNIT OR TABS Take 5,000 Units by mouth daily.       Modified Medications    No medications on file     Medications Discontinued During This Encounter   Medication Reason    cetirizine 10 MG Oral Tab        ?  ?  Allergies:  Allergies   Allergen Reactions    Aspirin NAUSEA ONLY     HIGHER DOSAGES. LOW DOSE ASPIRIN OK    Bactrim [Sulfamethoxazole W/Trimethoprim] RASH    Motrin Ib NAUSEA  ONLY    Other UNKNOWN    Sulfamethoxazole UNKNOWN    Amlodipine RASH    Ibuprofen NAUSEA ONLY     ?  REVIEW OF SYSTEMS   ?  Review of Systems   Constitutional:  Positive for malaise/fatigue. Negative for chills, fever and weight loss.        Gets cold easily   HENT:  Positive for hearing loss.    Eyes:  Negative for pain and redness.        + itching of the eyes   Respiratory:  Positive for shortness of breath (hx COPD- with exertion). Negative for cough.    Cardiovascular:  Negative for chest pain, palpitations and leg swelling.   Gastrointestinal:  Positive for constipation (stable) and heartburn (intermittent). Negative for abdominal pain, blood in stool and diarrhea.   Genitourinary:  Negative for dysuria, hematuria and urgency.   Musculoskeletal:  Positive for joint pain. Negative for back pain and myalgias.   Skin:  Positive for itching and rash.   Neurological:  Positive for weakness (generalized). Negative for dizziness, tingling and headaches.   Endo/Heme/Allergies:  Positive for environmental allergies.   Psychiatric/Behavioral:  The patient has insomnia.      PHYSICAL EXAM   Today's Vitals:  Temperature Blood Pressure Heart Rate Resp Rate SpO2   Temp: 97.5 °F (36.4 °C) BP: 142/70 Pulse: 72 Resp: 16     ?  Current Weight Height BMI BSA Pain   Wt Readings from Last 1 Encounters:   05/27/25 136 lb (61.7 kg)    Height: 5' 6\" (167.6 cm) Body mass index is 21.95 kg/m². Body surface area is 1.7 meters squared.         Physical Exam  Vitals and nursing note reviewed.   Constitutional:       General: He is not in acute distress.     Appearance: He is well-developed. He is not diaphoretic.      Comments: thin   HENT:      Head:      Comments: Slight scar from recent laceration      Ears:      Comments: + hearing aid  Eyes:      General: No scleral icterus.     Extraocular Movements: Extraocular movements intact.      Conjunctiva/sclera: Conjunctivae normal.   Neck:      Vascular: No JVD.      Trachea: No tracheal  deviation.   Cardiovascular:      Rate and Rhythm: Normal rate and regular rhythm.   Pulmonary:      Effort: Pulmonary effort is normal. No respiratory distress.      Breath sounds: Normal breath sounds. No wheezing.   Musculoskeletal:         General: Deformity present. No swelling or tenderness.      Cervical back: Neck supple.      Comments: B/l cmc enlargement wo synovitis  Left pinky finger post-raumatic  No swelling, tenderness, redness or restriction of motion of the DIPs, pips, MCPs, wrists, elbows, ankles, or joints of the feet.  B/l Chronic contracture of left pinky - appearance of dupuytren's contracture   Bilateral knees without medial joint line tenderness, mild crepitus, no effusion.  Neck flexed and difficulty straightening; thoracic kyphosis - stable   Previously noted- Pes planus; Left lateral mid foot nodule without surrounding synovitis   Lymphadenopathy:      Cervical: No cervical adenopathy.   Skin:     General: Skin is warm and dry.      Coloration: Skin is pale.      Findings: No erythema or rash.      Comments: Significant psoriaform lesions noted over chest, abdomen, back, upper arms -- resolved   + diffusely nail pitting; scattered onycholysis -- resolved    Neurological:      Mental Status: He is alert and oriented to person, place, and time.      Cranial Nerves: No cranial nerve deficit.      Gait: Gait abnormal.   Psychiatric:         Mood and Affect: Mood normal.         Behavior: Behavior normal.       ?  Radiology review:       DATE OF SERVICE: 06.04.2024  CT CHEST    CLINICAL INFORMATION: Cough, unspecified type    COMPARISON: CT chest 6/20/2023.    TECHNIQUE:  Routine helical scanning was performed through the chest without contrast.    Automated exposure control and ALARA manual techniques for patient specific dose reduction were  followed while maintaining the necessary diagnostic image quality.    FINDINGS:  Evaluation of the mediastinal and vascular structures is suboptimal  without IV contrast.    SUPPORT DEVICES:  None.  THORACIC INLET:Normal.  HEART: Coronary artery atherosclerosis..  AORTA:Aortic atherosclerosis.  PULMONARY ARTERIES:Normal.  MEDIASTINUM/MEME:  Normal.  ESOPHAGUS:Normal.  CHEST WALL:  Normal.  AXILLA:Normal.  MUSCULOSKELETAL:  Degenerative changes of the spine.  UPPER ABDOMEN: Normal.  TRACHEA:Normal.  LUNGS/PLEURA:  Moderate panlobular emphysema.    Lower lobe bronchial wall thickening and mucous plugging with associated interstitial and  groundglass opacities. Similar findings are also seen at the lingula.    Scattered 3 mm pulmonary micronodules (series 3 image 50, 75, 167) are stable.  Previously noted interval left upper lobe 3.5 mm nodule has resolved.    =====  IMPRESSION:    1.  Lower lobe and lingular bronchial wall thickening, mucous plugging with associated opacities.  These are new from 6/28/2023 and suggest an infectious or inflammatory bronchitis.  2.  Stable pulmonary micronodules.    Clinicians: If you have any questions or concerns regarding the study or report, contact the  interpreting radiologist Haris Ruffin MD directly at extension [phone].  Exam End: 06/04/24  7:11 PM    Specimen Collected: 06/05/24  9:24 AM Last Resulted: 06/05/24  9:33 AM     DATE OF SERVICE: 06.04.2024   CLINICAL INDICATION: 84 years-old Male with  Cough, unspecified type.     TECHNIQUE: Axial paranasal sinus acquisition, with sagittal and coronal reformations.   Contrast: Noncontrast.   Dose reduction CT scan done according to ALARA (As Low as Reasonably Achievable) or ALARA/IMAGE   GENTLY.     COMPARISON: CT facial bones, 7/202019.     FINDINGS:   Nasal cavity (turbinates, septum): NO nasal cavity masses or large polyps are identified. There is   moderate bilateral turbinate hypertrophy and mucosal thickening with bilateral large middle   turbinate samuel bullosa narrowing the central nasal passageways. The bilateral middle turbinates   show paradoxical morphology.    Septum: There is mild nasal septal bowing to the LEFT with a small spur contacting the LEFT inferior   turbinate. Additional mild 3 mm spurring along the posterior aspect of the RIGHT nasal septum is   noted.     SINUSES AND DRAINAGE PATHWAYS:   RIGHT:   Frontal sinus and frontal recess: Mild mucosal thickening at the inferior RIGHT frontal sinus   narrowing the frontoethmoidal recess.   Maxillary sinus: Clear. There is a patent accessory maxillary ostium.   Ethmoid sinuses: Clear.   Ostiomeatal complex: Patent   Sphenoid sinus: Clear.  Sphenoethmoidal recess: Clear.     LEFT:   Frontal sinus and frontal recess: Clear.   Maxillary sinus: Clear. There are healed LEFT maxillary sinus and zygoma fractures. There is a   patent accessory maxillary ostium.   Ethmoid sinuses: Clear.   Ostiomeatal complex: Patent with a small infraorbital ethmoidal air (Beto) cell.   Sphenoid sinus: Clear.   Sphenoethmoidal recess: Clear.     ANATOMIC VARIATIONS:   Ethmoid roofs: RIGHT Keros II morphology. LEFT Keros II morphology. The olfactory fossae and   cribriform plates are morphologically intact without CT evidence of congenital osseous dehiscence.   Frontal air cells: A RIGHT type IV frontal air cell is present.   Sphenoid sinus: Sellar/post-sellar pneumatization of the sphenoid sinus. Intact appearance of the   osseous sellar floor.Nonpneumatized yanet bandar, clinoid processes, and sphenoid pterygoid   recesses. NO Onodi air cells.   Carotid canals: The osseous coverings of the carotid canals are intact.   Lamina papyracea: Intact bilaterally.   Ethmoidale notches: Anterior ethmoidal notches are protected, abutting the fovea ethmoidalis.   Orbits: There are bilateral lens replacements noted.   Anterior cranial fossa: Intact.   Maxillary teeth: NO CT evidence of odontogenic sinusitis. Intact hard palate.   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   Other findings: The visualized portions of the middle ear cavities and  mastoid air cells are clear.   A few punctate calcifications are noted in the LEFT parotid salivary gland.   Nasopharynx/oropharynx: NO nasopharyngeal or tonsillar mass detected.     DATE OF SERVICE: 03.20.2024  CT ABDOMEN+PELVIS(CONTRAST ONLY)(CPT=74177)    CLINICAL INDICATION: Right sided abdominal pain.    COMPARISON STUDY: None available.    TECHNIQUE:  Axial images of the abdomen and pelvis were performed from the lung bases through the  pubic symphysis after the injection of nonionic intravenous contrast.  Oral contrast was not used  for the examination.  80 mL of Isovue 370 were administered intravenously.    Automated exposure control and ALARA manual techniques for patient specific dose reduction were  followed while maintaining the necessary diagnostic image quality.    ADVERSE REACTION: None.    FINDINGS:    LUNG BASES: There are emphysematous changes in the lungs.    LIVER: There are subcentimeter hypodensities too small to further characterize in the liver    GALLBLADDER/BILIARY TREE: Normal.    PANCREAS: Normal.    SPLEEN: Normal    ADRENAL GLANDS: Normal.    KIDNEYS URETERS AND BLADDER: Normal.    PELVIC ORGANS: Prostate gland is present.    ABDOMINAL AORTA: Abdominal aorta is normal in caliber. There are prominent atherosclerotic  calcifications and plaque in the abdominal aorta and its branch vessels. There is high-grade  stenosis or occlusion of the left common iliac artery.    LYMPH NODES: There is no evidence of mesenteric, retroperitoneal or inguinal adenopathy.    BOWEL: There are diverticula in the colon. There is a moderate to large amount of stool in the  colon. The appendix is normal in caliber. Small bowel loops are normal in caliber.    PERITONEUM: No free fluid.    ABDOMINAL WALL: There is a small right inguinal hernia containing fat. There is protrusion of a  couple small bowel loops towards the hernia. There is no obstruction.    OSSEOUS STRUCTURES: There are degenerative changes in  the lumbar spine. There is grade 1  anterolisthesis of L5 on S1 and bilateral spondylolysis at L5.    =====  IMPRESSION:  1. 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 Colonic diverticulosis and moderate to large amount stool in the colon  3. Small right inguinal hernia containing fat with protrusion of a couple small bowel loops towards  the hernia sac without obstruction  Exam End: 03/20/24 11:26 AM    Specimen Collected: 03/20/24 11:49 AM Last Resulted: 03/20/24 12:01 PM     DATE OF SERVICE: 10.01.2023  MRI CERVICAL SPINE    CLINICAL INDICATION: Cervical radiculopathy  upper back pain. Fall July 12, 2023 with C4  spinous process fracture    TECHNIQUE: Multiplanar and multisequence MR imaging was performed through the cervical spine  using the standard MR protocol.    COMPARISON:   Report CT cervical spine 7/12/2023 from [facility]    CONTRAST: None    FINDINGS:    Alignment: Straightening and reversal normal cervical lordosis . Grade 1 C3-C4 and C4-C5  anterolisthesis and C5-C6 retrolisthesis.  Vertebrae: Vertebral body heights are maintained. The known C4 spinous process fracture is less  apparent on MRI than on the previous reported CT. Multilevel Modic 1/2 endplate degenerative changes  otherwise unremarkable marrow signal.  Cervical Cord:   Cerebellar tonsils are appropriately positioned.  Cervical medullary junction is unremarkable.  Cervical cord has maintained caliber and signal.    Discs: Diffuse disc desiccation. There is mild C3-C4 and C4-C5, severe C5-C6 and C6-C7 disc space  narrowing. Multilevel osteophytosis.  Disc levels:  C1-C2: Unremarkable.  Central canal: No stenosis.  C2-C3: Severe left and moderate right facet arthropathy.  Central canal: No stenosis.  Neural foramen:  Mild left and no right foraminal stenosis.  C3-C4: Grade 1 anterolisthesis. Disc osteophyte complex with moderate left and mild  to  moderate right facet arthropathy.  Central canal: Mild stenosis  Neural foramen:  Moderate right and severe left foraminal stenosis.  C4-C5: Grade 1 anterolisthesis. Disc osteophyte complex with severe right and mild left facet  arthropathy.  Central canal: Mild stenosis  Neural foramen:  Moderate right greater than left foraminal stenosis.  C5-C6: Disc osteophyte complex with moderate right and mild left facet arthropathy. There is  diastases of the left facet joint which is measuring 1.2 cm (series 602, image 5). There is no  evidence of jumped or perched facet. No evidence of fracture by MR.  Central canal: Mild stenosis  Neural foramen:  Moderate right greater than left stenosis.  C6-C7: Disc osteophyte complex eccentric towards the left with mild facet arthropathy  Central canal: No stenosis  Neural foramen:  Severe left and moderate right foraminal stenosis.  C7-T1: Unremarkable  Central canal: No stenosis.  Neural foramen:  No bilateral neural foraminal stenosis.  T1-T2: Disc osteophyte complex. T1 left posterior lateral bone island. No central canal stenosis.  Mild left and moderate right foraminal stenosis.    Paraspinal Soft Tissues: Interspinous ligamentous edema at the C5-C6 level indicating underlying  ligamentous injury.  Other findings: None..    =====  IMPRESSION:  1.  C5-C6 left facet joint diastases with interspinous ligamentous injury at C5-C6. Given the  degree of diastases, stability of the facet joint is indeterminate and given the possibility of  instability, reevaluation with CT cervical spine is recommended to assess for underlying new osseous  injury which may be MR occult.  2.  The above findings were communicated by telephone to Abby Freedman NP by Dr. Leslee Hammond on  10/2/2023 3:30 PM.  3.  Multilevel cervical spondylosis with spinal stenosis. There is advanced spinal stenosis  involving the foraminal levels of C6-C7 and to a lesser degree C3-C4 through C5-C6. There is  mild  multilevel central canal stenosis involving C3-C4 through C5-C6.  Exam End: 10/01/23  3:20 PM    Specimen Collected: 10/02/23  1:49 PM Last Resulted: 10/02/23  3:35 PM   Received From: [facility]  Result Received: 11/20/23  2:30 PM     Narrative                 Impression   PROCEDURE:  CT BRAIN OR HEAD ([id number])     COMPARISON:  [facility], CT, CT BRAIN OR HEAD ([id number]), 7/12/2023, 2:05 PM.     INDICATIONS:  Left eye droop     TECHNIQUE:  Noncontrast CT scanning is performed through the brain. Dose reduction techniques were used. Dose information is transmitted to the ACR (American College of Radiology) NRDR (National Radiology Data Registry) which includes the Dose Index  Registry.     PATIENT STATED HISTORY: (As transcribed by Technologist)  Left eye droop      FINDINGS:  No evidence of intracranial hemorrhage or extra-axial fluid collection.  Lucencies in the deep periventricular white matter are likely sequelae of chronic small vessel ischemic disease.  Prominence of the sulci.  No mass effect.  Visualized portions of paranasal sinuses are unremarkable.  Visualized portions of the mastoid air cells are unremarkable.  Visualized portions of the orbits are unremarkable.  IMPRESSION:  Sequelae of chronic small vessel ischemic disease is noted. No evidence of intracranial hemorrhage or extra-axial fluid collection.           LOCATION:  [facility]        Dictated by (CST): Jakub Howard MD on 7/14/2023 at 10:28 AM      Finalized by (CST): Jakub Howard MD on 7/14/2023 at 10:31 AM      Narrative   PROCEDURE:  CT SPINE CERVICAL (CPT=72125)     COMPARISON:  None.     INDICATIONS:  fall, +LOC, lac to back of head     TECHNIQUE:  Noncontrast CT scanning of the cervical spine is performed from the skull base through C7.  Multiplanar reconstructions are generated.  Dose reduction techniques were used. Dose information is transmitted to the ACR (American College of  Radiology) NRDR (National Radiology Data  Registry) which includes the Dose Index Registry.     PATIENT STATED HISTORY: (As transcribed by Technologist)  Patient states he fell today.         FINDINGS:  Cervical spine demonstrates normal vertebral body height and alignment.  Adelina Multilevel degenerative changes are present.  The spinal canal is patent.  Evaluation of the soft tissues demonstrates a hematoma in the left paraspinal musculature  measuring up to 5.3 x 2.9 cm. Lung apices are within normal limits.                   Impression   CONCLUSION:       1. Hematoma in the left paraspinal musculature is noted.       2. Fracture at the tip of the C4 spinous process.        LOCATION:  [facility]        Dictated by (CST): Yariel Hammond MD on 7/12/2023 at 2:28 PM      Finalized by (CST): Yariel Hammond MD on 7/12/2023 at 2:33 PM         Exam: CTA ABDOMEN  and  PELVIS WW/O CONTRAST   CPT Code(s): 50041,-996 - CT ANGIO ABD and PELV W/O and W/DYE,Omnipaque 350 100 mL vial     INDICATION:  Crescentic focus of increased T1 signal intensity within the wall of the aorta on a lumbar spine MRI study. It was uncertain if this represented calcification or intramural hemorrhage. A CTA study was recommended for further evaluation.     COMPARISON:  5/12/2022 lumbar spine MRI study.     TECHNIQUE: Routine CTA study of the abdomen and pelvis performed with 100 cc of intravenous Omnipaque 350.  Sagittal and coronal reconstructed images. This study was performed on a Siemens Somatom Go CT scanner with both CARE Dose 4D tube modulation and   SAFIRE iterative reconstruction for radiation dose reduction.     FINDINGS:   LUNG BASES: Nonspecific linear atelectasis versus scarring is identified at the lung bases bilaterally. The visualized lung bases are otherwise clear with no focal airspace consolidation. There is no evidence of pleural fluid. Heart size is within normal    limits.     ABDOMEN:  The liver, spleen, pancreas, adrenal glands, and adrenal glands, and kidneys are  normal. The gallbladder is unremarkable with no evidence of gallstones or gallbladder wall thickening.     The abdominal aorta is normal in caliber with extensive atherosclerotic plaque and atherosclerotic vascular calcifications identified corresponding to the crescentic areas of T1 hyperintense signal seen on the MRI study. There is no evidence of an   intramural hematoma or aortic dissection. The celiac and superior mesenteric artery origins are widely patent bilaterally. There is a single widely patent left renal artery with 3 smaller caliber, but widely patent right renal arteries. There is focal   atherosclerotic ulceration of the inferior mesenteric artery with no significant stenosis. 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 There is mild tortuosity and dilatation of the proximal right common iliac artery measuring up to 1.6 cm. There is a weblike atherosclerotic plaque within the proximal right common iliac artery with no   significant right common iliac artery stenosis. Atherosclerotic calcifications are identified elsewhere within the external and internal iliac arteries with no other significant stenosis.     PELVIS: There is mild to moderate nonspecific heterogeneous enlargement of the prostate gland. The bladder is unremarkable with no significant bladder wall thickening. Normal caliber pelvic bowel loops with no evidence of bowel wall thickening. Normal   caliber bowel with no evidence of bowel wall thickening. Sigmoid colon diverticulosis is present with no evidence of diverticulitis. The appendix is normal. There is no evidence of free fluid or lymphadenopathy throughout the abdomen or pelvis. There is   a redemonstrated grade 1 anterolisthesis of L4 on L5 with bilateral L5 pars defects. Severe diffuse moderate to severe diffuse lumbar  spondylosis and degenerative facet disease are again identified. The bone structures otherwise unremarkable with no   lytic or sclerotic bone lesions.     IMPRESSION:   1. 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 This finding was discussed by phone with Dr. Hernandez. Conventional angiography is recommended for further evaluation.   2. Extensive atherosclerotic plaque and extensive atherosclerotic calcifications throughout the abdominal aorta and iliac vessels. No evidence of aortic dissection or intramural hematoma. There is mild tortuosity and dilatation of the right common iliac   artery with a weblike plaque. No significant right common iliac artery narrowing is appreciated.   3. Nonspecific linear atelectasis versus scarring at the lung bases bilaterally.   4. Incidental findings include mild to moderate nonspecific heterogeneous enlargement of the prostate glands, sigmoid colon diverticulosis, a redemonstrated grade 1 anterolisthesis of L4 on L5 with bilateral L5 pars defects, and moderate to severe   diffuse lumbar spondylosis which is better evaluated on on the lumbar spine MRI study.     Interpreting Radiologist:     Dave Pereira M.D.   Electronically Signed: 05/13/2022 04:08 PM    Exam: MRI LUMBAR SP W/O CONTRAST   CPT Code(s): 61216 - MRI LUMBAR SPINE W/O DYE     MRI LUMBAR SPINE WITHOUT CONTRAST     HISTORY: Low back pain, unspecified     COMPARISON: None currently available.     TECHNIQUE: Routine MRI exam performed on a wide bore ultra high field 3.0 T Siemens Skyra MR scanner, without intravenous contrast.     FINDINGS: Convex left curvature of the lumbar spine measuring 16 degrees. Vertebral body heights are maintained. Grade 1 retrolisthesis at the L4-5 level. Grade 1 anterolisthesis at the L5-S1 level. Multilevel discogenic reactive  marrow changes. The tip   of the conus medullaris is at the L1 level. Visualized spinal cord and cauda equina have a normal appearance. Paraspinous musculature appears demonstrate mild to moderate fatty atrophy. Multilevel atherosclerosis of the abdominal aorta. In the distal   aorta there crescentic areas of increased signal on T1-weighted sequences which may represent calcification however the possibility of an intramural hemorrhage cannot be excluded.     T11-12: Disc degeneration characterized by disc desiccation and a broad posterior disc bulge. Bilateral facet arthropathy. No central canal stenosis. Moderate to severe right and mild left neural foraminal narrowing.     T12-L1: Disc degeneration characterized by disc desiccation, 30-60% loss of disc height and a shallow posterior disc bulge with a superimposed right subarticular protrusion measuring 3 to 4 mm AP dimension. No significant central canal stenosis. Mild   right and no left neural foraminal narrowing. Mild bilateral facet arthropathy.     L1-2: Disc degeneration characterized by disc desiccation and a shallow posterior disc bulge. Disc height is maintained. Mild facet arthropathy and ligamentum flavum hypertrophy. No significant central canal stenosis or neural foraminal narrowing.     L2-3: Disc degeneration characterized by disc desiccation, 30-60% loss of disc height and a broad posterior disc bulge. Mild to moderate ligamentum flavum hypertrophy and mild facet arthropathy. No central canal stenosis. Mild bilateral neural foraminal   narrowing.     L3-4: Disc degeneration characterized by disc desiccation, 30-60% loss of disc height and a broad posterior disc bulge. Mild to moderate ligamentum flavum hypertrophy and mild facet arthropathy. No central canal stenosis. Mild bilateral neural foraminal   narrowing.     L4-5: Grade 1 retrolisthesis. Disc degeneration characterized by disc desiccation, greater than 60% loss of disc height and a broad  posterior disc bulge. Dorsal annular fissure. Mild facet arthropathy and ligamentum flavum hypertrophy. Narrowing of the   left subarticular recess with encroachment on the left L5 nerve root. Moderate left neural foraminal narrowing. Mild right neural foraminal narrowing.     L5-S1: Grade 1 anterolisthesis. Disc degeneration characterized by disc desiccation and a broad posterior disc bulge. Mild facet arthropathy and ligamentum flavum hypertrophy. No central canal stenosis. Moderate to severe right and severe left neural   foraminal narrowing.     IMPRESSION:   1. L5-S1: Grade 1 anterolisthesis combined with multifactorial degenerative changes results in moderate to severe right and severe left neural foraminal narrowing. No central canal stenosis.   2. L4-5: Grade 1 retrolisthesis combined with multifactorial degenerative changes results in marked narrowing of the left subarticular recess with encroachment on the left L5 nerve root, moderate left neural foraminal narrowing and mild right neural   foraminal narrowing.   3. L3-4: Mild bilateral neural foraminal narrowing. No central canal stenosis.   4. L2-3: Mild bilateral neural foraminal narrowing. No central canal stenosis.   5. Diffuse aortic atherosclerosis. Crescentic area of increased signal on T1 sequences in the distal aorta may represent calcification, however, an intramural hemorrhage cannot be excluded based on this exam. A CTA of the abdominal aorta is recommended   for further evaluation. This finding and recommendations were discussed with Dr. Hernandez at the time of this interpretation with voiced understanding.     Interpreting Radiologist:     Armani Grimes M.D.   Electronically Signed: 05/13/2022 09:06 AM    DATE OF SERVICE: 12.23.2021   SACROILIACS, 1 FRONTAL AND 2 OBLIQUE VIEWS     CLINICAL INFORMATION:  Psoriatic arthritis (HCC).     COMPARISON STUDY: None.     FINDINGS:       There are no definite acute fractures or dislocations.     No  radiographic signs of ankylosis/fusion or significant degenerative disease.     No bony lesions are identified.     Diffuse osteopenia is present.     Impression   IMPRESSION:     Unremarkable radiographic examination of the sacroiliac joints.      DATE OF SERVICE: 12.23.2021   LUMBAR SPINE AP, LATERAL, FLEXION, AND EXTENSION VIEWS     CLINICAL INDICATION: Chronic left-sided low back pain     COMPARISON: None     FINDINGS: Routine views of the lumbar spine show 5 lumbar-type vertebral bodies.     There is levoscoliosis of the thoracolumbar spine. There is grade 1 spondylolisthesis of L5 on S1.   Otherwise, overall alignment of the lumbar spine is within normal limits.. The vertebral body   heights appear well-maintained. The visualized pedicles appear grossly intact.     No definite fracture or subluxation is seen. Flexion and extension views demonstrate no significant   abnormal movements.     Decreased disc height, endplate osteophytes, and/or endplate sclerotic changes consistent with   degenerative spondylosis. Posterior facet arthropathy notable at the mid to lower lumbar spine and   particularly at the lumbosacral junction.     If there is continued clinical concern, cross-sectional imaging may be considered depending on the   remainder of the patient's clinical situation.       Extensive arteriovascular calcifications of the abdominal aorta noted.     Impression   IMPRESSION:   Moderate to severe lumbar spondylosis including degenerative disc disease and posterior facet   arthropathy as detailed above.        PROCEDURE:  MRI BRAIN MRA HEAD+MRA NECK (ALL W+WO) (CPT=70553/03391/34903)       COMPARISON:  None.       INDICATIONS:  eval CVA       TECHNIQUE:  MRI of the brain was performed with multi-planar T1, T2-weighted images with FLAIR sequences and diffusion weighted images without and with infusion.  MR angiography of the brain without and with infusion and MR angiography of the neck   without and with  infusion was performed using 3D time of flight, multi-planar and 3D reconstructed images. All measurements obtained in this exam were performed using NASCET criteria.       PATIENT STATED HISTORY:(As transcribed by Technologist)  Patient with unsteady gait.        CONTRAST USED:  14 mL of Dotarem       FINDINGS:        MRI BRAIN   The ventricles and sulci are normal in size for the patient's age with mild atrophy present not uncommon for the age of 81.  No mass-effect, midline shift, hydrocephalus, herniation, extra-axial fluid collections, or signs of acute territorial infarct,   or brain tumor.       No abnormality of midline structures. No sign of restricted diffusion. No pathologic gradient susceptibility pattern.       Flow voids are present within the internal carotid and basilar arteries. No sign of acute fluid in the paranasal sinuses.       With contrast infusion, there is no abnormal enhancement pattern identified.       MRA HEAD AND NECK       Patency of the carotid and vertebral arterial structures of the neck, with no signs of occlusion, high-grade stenosis, acute thrombosis, acute dissection involving the common carotid, internal carotid, external carotid, vertebral arteries bilaterally.     Both vertebral arteries join forming basilar artery.  The basilar artery is patent.       Intracranial circulation demonstrates patency of the intracranial ICA bilaterally, the right and left anterior, middle, and right posterior cerebral arteries without evidence for high-grade stenosis, occlusion, or other acute abnormality.  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  No signs of occlusion.  No restricted diffusion or other features for acute infarct.         Dictated by (CST): Gio Trammell MD on 6/20/2021 at 4:36 PM        Finalized by (CST): Gio Trammell MD on 6/20/2021 at 4:45 PM       PROCEDURE:  XR CHEST AP PORTABLE  (CPT=71045)       TECHNIQUE:  AP chest radiograph was obtained.       COMPARISON:  [facility] , XR, CHEST AP PORT, 3/26/2011, 10:00 AM.       INDICATIONS:  dizziness and feeling unsteady on feet with nausea starting at 4am, vomited a couple hours ago       PATIENT STATED HISTORY: (As transcribed by Technologist)  Patient stated having symptoms of dizziness.            FINDINGS:  The heart is borderline in size.  The lungs are clear of acute-appearing disease process.  The costophrenic angles are sharp.  There is no active disease seen on the basis of portable radiography.                        Impression   CONCLUSION:  Borderline heart size. No active disease seen.           Dictated by (CST): Gio Trammell MD on 6/20/2021 at 3:46 PM       Finalized by (CST): Gio Trammell MD on 6/20/2021 at 3:47 PM      DATE OF SERVICE: 04.22.2021     LEFT FOOT  AP, LATERAL AND OBLIQUE (3 VIEWS)   CLINICAL INFORMATION:  Left foot pain.   COMPARISON STUDY: Left foot, 12/6/2020.   FINDINGS:   No acute fracture or dislocation is seen.   Diffuse osteopenia is present.   The joint spaces are essentially maintained.   The lateral view shows a small inferior calcaneal exostosis (plantar calcaneal spur)    DATE OF SERVICE: 01.28.2021     XR HAND (MIN 3 VIEWS), LEFT (CPT=73130), XR HAND (MIN 3 VIEWS), RIGHT (CPT=73130), XR WRIST COMPLETE   (MIN 3 VIEWS), LEFT (CPT=73110), XR WRIST COMPLETE (MIN 3 VIEWS), RIGHT (CPT=73110)   CLINICAL INDICATION: Bilateral hand swelling. Elevated sedimentation rate and CRP. History of   psoriasis.   COMPARISON STUDIES: None available.   FINDINGS:   Right hand: There is no evidence of acute fracture or dislocation. The MCP joints and   interphalangeal joints of the hand are preserved. No degenerative osteophytes or discrete osseous   erosions are seen at these joints.   Left hand: There is no evidence  of acute fracture or dislocation. The MCP joints and interphalangeal   joints of the hand are preserved. No degenerative osteophytes or discrete osseous erosions are seen   at these joints.   Right wrist: There is no evidence of acute fracture or dislocation. There is mild narrowing of the   thumb CMC joint, with mild marginal osteophyte formation. All other joint spaces of the wrist are   preserved. No discrete osseous erosions are identified.   Left wrist: There is no evidence of acute fracture or dislocation. There is mild narrowing of the   thumb CMC joint, with mild marginal osteophyte formation. All other joint spaces of the wrist are   preserved. No discrete osseous erosions are identified.   Impression   IMPRESSION:   1. 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 Otherwise, no significant arthritic   changes are seen in the bilateral hands and wrists.     DATE OF SERVICE: 01.01.2021     CT CHEST   CLINICAL INDICATION: Follow-up abnormal chest CT   COMPARISON: Chest CT 5/1/2020   TECHNIQUE: 1.25mm thick axial images were obtained through the chest. Coronal reconstructions were   included.   Automated exposure control and ALARA manual techniques for patient specific dose reduction were   followed while maintaining the necessary diagnostic image quality.   FINDINGS:     Lungs and large airways: A nodular density previously seen in the superior segment of the right   lower lobe is no longer visible. Multiple additional scattered small 1 to 2 mm nodular densities are   seen. A groundglass attenuation density measuring approximately 5 mm on image 55 in the right upper   lobe is stable. A 3 mm nodule in the right upper lobe on image 69 looks new, and may be a mucous   filled bronchus. A subpleural 3 mm nodular density in the left lower lobe on image 168 is stable.   There is platelike atelectasis or scarring in the lingula which is slightly more pronounced than   previously. Diffuse underlying  emphysematous changes are present. There are platelike areas of   scarring or atelectasis in both lung bases. Biapical pleural parenchymal scarring is present.   Pleura:  Normal. No pleural effusion or thickening.   Heart and pericardium:  There are calcifications in the plane of the aortic valve, suggestive of   calcific aortic stenosis..   Mediastinum and dayan:  Normal.   Chest wall and lower neck:  Normal.   Vessels:  Atherosclerotic changes in the aorta and coronary arteries.   Bones:  Multilevel degenerative changes are present in the spine. Postoperative changes are noted in   the left hemithorax.   Upper abdomen: There is a small hiatal hernia.   Impression   IMPRESSION:   1. Emphysema.   2. Multiple small nodular densities in the lungs measuring up to 3 mm as described above.   3. Atherosclerosis and possible calcific aortic stenosis     Labs:  Lab Results   Component Value Date    WBC 8.1 03/20/2025    RBC 4.97 03/20/2025    HGB 15.9 03/20/2025    HCT 45.7 03/20/2025    .0 03/20/2025    MPV 9.9 03/26/2011    MCV 92.0 03/20/2025    MCH 32.0 03/20/2025    MCHC 34.8 03/20/2025    RDW 13.6 03/20/2025    NEPRELIM 5.34 03/20/2025    NEPERCENT 65.9 03/20/2025    LYPERCENT 19.5 03/20/2025    MOPERCENT 12.5 03/20/2025    EOPERCENT 1.4 03/20/2025    BAPERCENT 0.5 03/20/2025    NE 5.34 03/20/2025    LYMABS 1.58 03/20/2025    MOABSO 1.01 (H) 03/20/2025    EOABSO 0.11 03/20/2025    BAABSO 0.04 03/20/2025     Lab Results   Component Value Date    GLU 96 03/20/2025    BUN 16 03/20/2025    BUNCREA 17.1 12/02/2022    CREATSERUM 1.08 03/20/2025    ANIONGAP 4 03/20/2025    GFR >59 04/23/2010    GFRNAA 64 01/13/2022    GFRAA 74 01/13/2022    CA 9.8 03/20/2025    OSMOCALC 285 03/20/2025    ALKPHO 62 03/20/2025    AST 19 03/20/2025    ALT 14 03/20/2025    BILT 1.5 (H) 03/20/2025    TP 7.2 03/20/2025    ALB 4.7 03/20/2025    GLOBULIN 2.5 03/20/2025    AGRATIO 2.3 12/08/2014     03/20/2025    K 4.2 03/20/2025    CL 99  03/20/2025    CO2 34.0 (H) 03/20/2025       Additional Labs:  03/2025  Vit D 85.2  TB negative     10/2024  ESR 20 normal  CRP normal  C3 96.4 normal  C4 13.1 normal   Myositis ab panel negative  SOO screen negative  Mag 1.9 normal  Vit D 81.6 normal    06/2024  ESR 15 normal  CRP normal      02/2024  ESR 18 normal  CRP normal  Vitamin D 64.1 normal  CMP grossly normal  CBC grossly normal  B12 503 normal  B6 normal  B1 normal  Phosphorus 2.7 normal  Mag 2.2 normal  QuantiFERON-TB negative    12/2022   Mag 2.1 normal  B12 677 normal   Vit D 65.6 normal   Tsh normal    ESR 5 normal  CRP normal   Uric acid 5.0     01/2022  SOO 1: 160 AMA  SOO 1: 160 speckled  dsDNA, Smith, histone negative  Remainder of EDMOND panel negative    05/2021  ESR 11  CRP normal     03/2021  ESR 9  CRP normal     01/2021  SOO 1:80  ESR 49 elevated  CRP 2.23 elevated  TB negative     12/2020  Uric acid 4.5  CCP neg  RF neg  SSA neg  SSB neg  ESR 36   CRP 1.94 elevated    Leslee David, DO  [facility]  05/27/2025   Retention Suture Text: Retention sutures were placed to support the closure and prevent dehiscence.

## (undated) DIAGNOSIS — I70.0 CALCIFICATION OF ABDOMINAL AORTA (HCC): Primary | ICD-10-CM

## (undated) NOTE — Clinical Note
Please see note- pt currently taking 150mg cosentyx q 4 weeks and has been for sometime.  Leslee Hernandez, DO [facility] 1/16/2025

## (undated) NOTE — Clinical Note
Hi, Dr. Garvey Cancer! I saw Mr Selin Acuña for 2nd opinion rheumatologic evaluation. Please see the discussion portion of my note and let me know if you have any questions.      Romelia Serrano, DO  [facility]  12/23/2021

## (undated) NOTE — Clinical Note
Please send updated rx for cosentyx to be 300mg q 4 weeks.    Elen Cortez DO [facility] 6/9/2023